# Patient Record
Sex: FEMALE | Race: WHITE | Employment: UNEMPLOYED | RURAL
[De-identification: names, ages, dates, MRNs, and addresses within clinical notes are randomized per-mention and may not be internally consistent; named-entity substitution may affect disease eponyms.]

---

## 2017-05-10 ENCOUNTER — HOSPITAL ENCOUNTER (OUTPATIENT)
Dept: GENERAL RADIOLOGY | Age: 62
Discharge: HOME | End: 2017-05-10
Attending: NURSE PRACTITIONER | Admitting: NURSE PRACTITIONER

## 2017-05-10 DIAGNOSIS — M79.662 BILATERAL CALF PAIN: ICD-10-CM

## 2017-05-10 DIAGNOSIS — M79.661 BILATERAL CALF PAIN: ICD-10-CM

## 2017-06-30 ENCOUNTER — OUTSIDE FACILITY SERVICE (OUTPATIENT)
Dept: CARDIOLOGY | Facility: CLINIC | Age: 62
End: 2017-06-30

## 2017-06-30 PROBLEM — E03.9 HYPOTHYROID: Status: ACTIVE | Noted: 2017-06-30

## 2017-06-30 PROBLEM — R20.0 NUMBNESS OF UPPER EXTREMITY: Status: ACTIVE | Noted: 2017-06-30

## 2017-06-30 PROCEDURE — 93306 TTE W/DOPPLER COMPLETE: CPT | Performed by: INTERNAL MEDICINE

## 2017-07-24 ENCOUNTER — OFFICE VISIT (OUTPATIENT)
Dept: NEUROLOGY | Facility: CLINIC | Age: 62
End: 2017-07-24

## 2017-07-24 VITALS
DIASTOLIC BLOOD PRESSURE: 81 MMHG | SYSTOLIC BLOOD PRESSURE: 140 MMHG | WEIGHT: 128 LBS | HEIGHT: 58 IN | HEART RATE: 96 BPM | BODY MASS INDEX: 26.87 KG/M2

## 2017-07-24 DIAGNOSIS — R41.840 CONCENTRATION DEFICIT: Primary | ICD-10-CM

## 2017-07-24 DIAGNOSIS — I73.9 MICROANGIOPATHY (HCC): ICD-10-CM

## 2017-07-24 DIAGNOSIS — E53.8 B12 DEFICIENCY: ICD-10-CM

## 2017-07-24 DIAGNOSIS — G43.009 MIGRAINE WITHOUT AURA AND WITHOUT STATUS MIGRAINOSUS, NOT INTRACTABLE: ICD-10-CM

## 2017-07-24 PROCEDURE — 99243 OFF/OP CNSLTJ NEW/EST LOW 30: CPT | Performed by: PSYCHIATRY & NEUROLOGY

## 2017-07-24 RX ORDER — CETIRIZINE HYDROCHLORIDE 10 MG/1
10 TABLET ORAL DAILY
COMMUNITY

## 2017-07-24 NOTE — PROGRESS NOTES
Subjective:     Patient ID: Nicole Mack is a 61 y.o. female.    History of Present Illness  The following portions of the patient's history were reviewed and updated as appropriate: allergies, current medications, past family history, past medical history, past social history, past surgical history and problem list.  60 y/o WF has been intermittent memory/concentration problems. Denies any problems functioning. She does not drive, does shop and cooks, handles finances. Resting well, denies syncope or mental lapses. She was admitted for headaches and numbness at [facility] last month, MRI of brain showed mild small vessel disease, carotid duplex bilateral ICA plaque, stenosis less than 50%. MRA negative. Symptoms resolved. Denies stroke symptoms or significant headaches since discharge. Found to have b12 deficiency in April, started on b12 shots.  Review of Systems   Constitutional: Negative for chills, fatigue, fever and unexpected weight change.   HENT: Negative for ear pain, hearing loss, nosebleeds, rhinorrhea and sore throat.    Eyes: Positive for visual disturbance. Negative for photophobia, pain, discharge and itching.   Respiratory: Positive for wheezing. Negative for cough, chest tightness and shortness of breath.    Cardiovascular: Negative for chest pain, palpitations and leg swelling.   Gastrointestinal: Negative for abdominal pain, blood in stool, constipation, diarrhea, nausea and vomiting.   Genitourinary: Negative for dysuria, frequency, hematuria and urgency.   Musculoskeletal: Positive for joint swelling. Negative for arthralgias, back pain, gait problem, myalgias, neck pain and neck stiffness.   Skin: Negative for rash and wound.   Allergic/Immunologic: Negative for environmental allergies and food allergies.   Neurological: Positive for headaches. Negative for dizziness, tremors, seizures, syncope, speech difficulty, weakness, light-headedness and numbness.   Hematological: Negative for adenopathy.  Does not bruise/bleed easily.   Psychiatric/Behavioral: Negative for agitation, confusion, decreased concentration, hallucinations, sleep disturbance and suicidal ideas. The patient is not nervous/anxious.         Objective:    Neurologic Exam     Mental Status   Oriented to person, place, and time.     Cranial Nerves     CN III, IV, VI   Pupils are equal, round, and reactive to light.  Extraocular motions are normal.     Motor Exam     Strength   Strength 5/5 throughout.       Physical Exam   Constitutional: She is oriented to person, place, and time. She appears well-developed and well-nourished.   HENT:   Head: Normocephalic and atraumatic.   Eyes: EOM are normal. Pupils are equal, round, and reactive to light.   Neck: Neck supple. Carotid bruit is not present.   Cardiovascular: Normal rate, regular rhythm, normal heart sounds and intact distal pulses.    Pulmonary/Chest: Effort normal and breath sounds normal.   Abdominal: Soft. Bowel sounds are normal.   Neurological: She is alert and oriented to person, place, and time. She has normal strength and normal reflexes. No cranial nerve deficit or sensory deficit. She displays a negative Romberg sign. Coordination and gait normal. She displays no Babinski's sign on the right side. She displays no Babinski's sign on the left side.   MMSE 27/30, STM 3/3;   Skin: Skin is warm.   Psychiatric: She has a normal mood and affect. Her behavior is normal. Thought content normal. Cognition and memory are normal.       Assessment/Plan:     Nicole was seen today for headache.    Diagnoses and all orders for this visit:    Concentration deficit    Microangiopathy    Migraine without aura and without status migrainosus, not intractable    B12 deficiency     We discussed that memory issues which are mild maybe related to b12 deficiency which is being corrected. She is to report new symptoms immediately, follow up in one year for a memory check.

## 2017-07-25 ENCOUNTER — PREP FOR SURGERY (OUTPATIENT)
Dept: OTHER | Facility: HOSPITAL | Age: 62
End: 2017-07-25

## 2017-07-25 DIAGNOSIS — H26.9 CATARACT, LEFT EYE: Primary | ICD-10-CM

## 2017-07-25 RX ORDER — CYCLOPENTOLATE HYDROCHLORIDE 20 MG/ML
1 SOLUTION/ DROPS OPHTHALMIC
Status: CANCELLED | OUTPATIENT
Start: 2017-07-25 | End: 2017-07-25

## 2017-07-25 RX ORDER — PHENYLEPHRINE HYDROCHLORIDE 100 MG/ML
1 SOLUTION/ DROPS OPHTHALMIC
Status: CANCELLED | OUTPATIENT
Start: 2017-07-25 | End: 2017-07-25

## 2017-07-25 RX ORDER — PREDNISOLONE ACETATE 10 MG/ML
1 SUSPENSION/ DROPS OPHTHALMIC 4 TIMES DAILY
Status: CANCELLED | OUTPATIENT
Start: 2017-07-25

## 2017-07-25 RX ORDER — TETRACAINE HYDROCHLORIDE 5 MG/ML
2 SOLUTION OPHTHALMIC AS NEEDED
Status: CANCELLED | OUTPATIENT
Start: 2017-07-25

## 2017-07-25 RX ORDER — POLYMYXIN B SULFATE AND TRIMETHOPRIM 1; 10000 MG/ML; [USP'U]/ML
1 SOLUTION OPHTHALMIC ONCE
Status: CANCELLED | OUTPATIENT
Start: 2017-07-25 | End: 2017-07-25

## 2017-07-25 RX ORDER — CYANOCOBALAMIN 1000 UG/ML
1000 INJECTION, SOLUTION INTRAMUSCULAR; SUBCUTANEOUS
COMMUNITY

## 2017-07-27 ENCOUNTER — ANESTHESIA EVENT (OUTPATIENT)
Dept: PERIOP | Facility: HOSPITAL | Age: 62
End: 2017-07-27

## 2017-07-27 ENCOUNTER — ANESTHESIA (OUTPATIENT)
Dept: PERIOP | Facility: HOSPITAL | Age: 62
End: 2017-07-27

## 2017-07-27 ENCOUNTER — HOSPITAL ENCOUNTER (OUTPATIENT)
Facility: HOSPITAL | Age: 62
Setting detail: HOSPITAL OUTPATIENT SURGERY
Discharge: HOME | End: 2017-07-27
Attending: OPHTHALMOLOGY | Admitting: OPHTHALMOLOGY

## 2017-07-27 VITALS
TEMPERATURE: 97.3 F | WEIGHT: 128 LBS | SYSTOLIC BLOOD PRESSURE: 126 MMHG | RESPIRATION RATE: 18 BRPM | HEART RATE: 56 BPM | HEIGHT: 58 IN | OXYGEN SATURATION: 96 % | DIASTOLIC BLOOD PRESSURE: 66 MMHG | BODY MASS INDEX: 26.87 KG/M2

## 2017-07-27 DIAGNOSIS — H26.9 CATARACT, LEFT EYE: ICD-10-CM

## 2017-07-27 PROCEDURE — 25010000002 MIDAZOLAM PER 1 MG: Performed by: NURSE ANESTHETIST, CERTIFIED REGISTERED

## 2017-07-27 PROCEDURE — 25010000002 EPINEPHRINE 1 MG/ML SOLUTION: Performed by: OPHTHALMOLOGY

## 2017-07-27 PROCEDURE — 25010000002 FENTANYL CITRATE (PF) 100 MCG/2ML SOLUTION: Performed by: NURSE ANESTHETIST, CERTIFIED REGISTERED

## 2017-07-27 PROCEDURE — V2632 POST CHMBR INTRAOCULAR LENS: HCPCS | Performed by: OPHTHALMOLOGY

## 2017-07-27 PROCEDURE — 25010000002 EPINEPHRINE 1 MG/ML SOLUTION 1 ML VIAL: Performed by: OPHTHALMOLOGY

## 2017-07-27 DEVICE — IMPLANTABLE DEVICE: Type: IMPLANTABLE DEVICE | Status: FUNCTIONAL

## 2017-07-27 RX ORDER — MIDAZOLAM HYDROCHLORIDE 1 MG/ML
INJECTION INTRAMUSCULAR; INTRAVENOUS AS NEEDED
Status: DISCONTINUED | OUTPATIENT
Start: 2017-07-27 | End: 2017-07-27 | Stop reason: SURG

## 2017-07-27 RX ORDER — TETRACAINE HYDROCHLORIDE 5 MG/ML
2 SOLUTION OPHTHALMIC AS NEEDED
Status: DISCONTINUED | OUTPATIENT
Start: 2017-07-27 | End: 2017-07-27 | Stop reason: HOSPADM

## 2017-07-27 RX ORDER — PHENYLEPHRINE HYDROCHLORIDE 100 MG/ML
1 SOLUTION/ DROPS OPHTHALMIC
Status: COMPLETED | OUTPATIENT
Start: 2017-07-27 | End: 2017-07-27

## 2017-07-27 RX ORDER — CYCLOPENTOLATE HYDROCHLORIDE 20 MG/ML
1 SOLUTION/ DROPS OPHTHALMIC
Status: COMPLETED | OUTPATIENT
Start: 2017-07-27 | End: 2017-07-27

## 2017-07-27 RX ORDER — POLYMYXIN B SULFATE AND TRIMETHOPRIM 1; 10000 MG/ML; [USP'U]/ML
1 SOLUTION OPHTHALMIC ONCE
Status: COMPLETED | OUTPATIENT
Start: 2017-07-27 | End: 2017-07-27

## 2017-07-27 RX ORDER — SODIUM CHLORIDE 0.9 % (FLUSH) 0.9 %
3 SYRINGE (ML) INJECTION AS NEEDED
Status: DISCONTINUED | OUTPATIENT
Start: 2017-07-27 | End: 2017-07-27 | Stop reason: HOSPADM

## 2017-07-27 RX ORDER — PREDNISOLONE ACETATE 10 MG/ML
1 SUSPENSION/ DROPS OPHTHALMIC 4 TIMES DAILY
Status: DISCONTINUED | OUTPATIENT
Start: 2017-07-27 | End: 2017-07-27 | Stop reason: HOSPADM

## 2017-07-27 RX ORDER — EPINEPHRINE 1 MG/ML
INJECTION INTRAMUSCULAR; INTRAVENOUS; SUBCUTANEOUS AS NEEDED
Status: DISCONTINUED | OUTPATIENT
Start: 2017-07-27 | End: 2017-07-27 | Stop reason: HOSPADM

## 2017-07-27 RX ORDER — SODIUM CHLORIDE, SODIUM LACTATE, POTASSIUM CHLORIDE, CALCIUM CHLORIDE 600; 310; 30; 20 MG/100ML; MG/100ML; MG/100ML; MG/100ML
1000 INJECTION, SOLUTION INTRAVENOUS CONTINUOUS PRN
Status: DISCONTINUED | OUTPATIENT
Start: 2017-07-27 | End: 2017-07-27 | Stop reason: HOSPADM

## 2017-07-27 RX ORDER — FENTANYL CITRATE 50 UG/ML
INJECTION, SOLUTION INTRAMUSCULAR; INTRAVENOUS AS NEEDED
Status: DISCONTINUED | OUTPATIENT
Start: 2017-07-27 | End: 2017-07-27 | Stop reason: SURG

## 2017-07-27 RX ORDER — PREDNISOLONE ACETATE 10 MG/ML
SUSPENSION/ DROPS OPHTHALMIC
Status: DISCONTINUED
Start: 2017-07-27 | End: 2017-07-27 | Stop reason: HOSPADM

## 2017-07-27 RX ORDER — LIDOCAINE HYDROCHLORIDE 40 MG/ML
SOLUTION TOPICAL AS NEEDED
Status: DISCONTINUED | OUTPATIENT
Start: 2017-07-27 | End: 2017-07-27 | Stop reason: HOSPADM

## 2017-07-27 RX ORDER — BALANCED SALT SOLUTION 6.4; .75; .48; .3; 3.9; 1.7 MG/ML; MG/ML; MG/ML; MG/ML; MG/ML; MG/ML
SOLUTION OPHTHALMIC AS NEEDED
Status: DISCONTINUED | OUTPATIENT
Start: 2017-07-27 | End: 2017-07-27 | Stop reason: HOSPADM

## 2017-07-27 RX ORDER — CYCLOPENTOLATE HYDROCHLORIDE 20 MG/ML
SOLUTION/ DROPS OPHTHALMIC
Status: COMPLETED
Start: 2017-07-27 | End: 2017-07-27

## 2017-07-27 RX ORDER — PHENYLEPHRINE HYDROCHLORIDE 100 MG/ML
SOLUTION/ DROPS OPHTHALMIC
Status: COMPLETED
Start: 2017-07-27 | End: 2017-07-27

## 2017-07-27 RX ADMIN — SODIUM CHLORIDE, POTASSIUM CHLORIDE, SODIUM LACTATE AND CALCIUM CHLORIDE 1000 ML: 600; 310; 30; 20 INJECTION, SOLUTION INTRAVENOUS at 10:33

## 2017-07-27 RX ADMIN — PHENYLEPHRINE HYDROCHLORIDE 1 DROP: 100 SOLUTION/ DROPS OPHTHALMIC at 10:23

## 2017-07-27 RX ADMIN — PHENYLEPHRINE HYDROCHLORIDE 1 DROP: 100 SOLUTION/ DROPS OPHTHALMIC at 10:33

## 2017-07-27 RX ADMIN — PHENYLEPHRINE HYDROCHLORIDE 1 DROP: 100 SOLUTION/ DROPS OPHTHALMIC at 10:28

## 2017-07-27 RX ADMIN — FENTANYL CITRATE 50 MCG: 50 INJECTION, SOLUTION INTRAMUSCULAR; INTRAVENOUS at 11:28

## 2017-07-27 RX ADMIN — CYCLOPENTOLATE HYDROCHLORIDE 1 DROP: 20 SOLUTION/ DROPS OPHTHALMIC at 10:23

## 2017-07-27 RX ADMIN — CYCLOPENTOLATE HYDROCHLORIDE 1 DROP: 20 SOLUTION/ DROPS OPHTHALMIC at 10:28

## 2017-07-27 RX ADMIN — MIDAZOLAM HYDROCHLORIDE 0.5 MG: 1 INJECTION, SOLUTION INTRAMUSCULAR; INTRAVENOUS at 11:23

## 2017-07-27 RX ADMIN — CYCLOPENTOLATE HYDROCHLORIDE 1 DROP: 20 SOLUTION/ DROPS OPHTHALMIC at 10:33

## 2017-07-27 RX ADMIN — MIDAZOLAM HYDROCHLORIDE 0.5 MG: 1 INJECTION, SOLUTION INTRAMUSCULAR; INTRAVENOUS at 11:28

## 2017-07-27 NOTE — ANESTHESIA PREPROCEDURE EVALUATION
Anesthesia Evaluation     Patient summary reviewed and Nursing notes reviewed   no history of anesthetic complications:  NPO Solid Status: > 8 hours  NPO Liquid Status: > 8 hours     Airway   Mallampati: I  TM distance: >3 FB  Neck ROM: full  no difficulty expected  Dental    (+) upper dentures    Pulmonary - negative pulmonary ROS and normal exam   Cardiovascular - normal exam    (+) hyperlipidemia      Neuro/Psych  (+) CVA residual symptoms,      ROS Comment: lle weakness  GI/Hepatic/Renal/Endo - negative ROS     Musculoskeletal (-) negative ROS    Abdominal    Substance History - negative use     OB/GYN negative ob/gyn ROS         Other - negative ROS                                       Anesthesia Plan    ASA 3     MAC     intravenous induction   Anesthetic plan and risks discussed with patient.

## 2017-07-27 NOTE — ANESTHESIA POSTPROCEDURE EVALUATION
Patient: Nicole Mack    Procedure Summary     Date Anesthesia Start Anesthesia Stop Room / Location    07/27/17 1121 1141  [facility] /  [facility]       Procedure Diagnosis Surgeon Provider    CATARACT PHACO EXTRACTION WITH INTRAOCULAR LENS IMPLANT LEFT (Left Eye) No diagnosis on file. MD Katrin Callahan CRNA          Anesthesia Type: MAC  Last vitals  BP   126/66 (07/27/17 1217)    Temp   97.3 °F (36.3 °C) (07/27/17 1147)    Pulse   56 (07/27/17 1217)   Resp   18 (07/27/17 1217)    SpO2   96 % (07/27/17 1217)      Post Anesthesia Care and Evaluation    Patient location during evaluation: PHASE II  Patient participation: complete - patient participated  Level of consciousness: awake and alert  Pain score: 0  Pain management: satisfactory to patient  Airway patency: patent  Anesthetic complications: No anesthetic complications  PONV Status: none  Cardiovascular status: acceptable and stable  Respiratory status: acceptable  Hydration status: acceptable

## 2017-07-27 NOTE — H&P
[id number]  Nicole Mack  female  1955  Jw Mansfield MD  7/27/2017  PATIENT NAME: Nicole Mack    [facility]  PREOPERATIVE PHYSICAL EXAMINATION    Temp:  [97.3 °F (36.3 °C)] 97.3 °F (36.3 °C)  Heart Rate:  [66] 66  Resp:  [18] 18  BP: (157)/(84) 157/84    Past Medical History:   Diagnosis Date   • Arthritis of back    • Disease of thyroid gland    • Fracture     LEFT PINKY TOE AT PRESENT TIME, RIGHT ANKLE WHEN A TEENAGER   • GERD (gastroesophageal reflux disease)    • High cholesterol    • [place] (hard of hearing)     REPORTS NO HEARING AIDS   • Memory loss     REPORTS WAS RECENTLY DIAGNOSED BY PCP WITH HAVE MILD MEMORY LOSS, EARLY ONSET OF DEMENTIA   • Osteoarthritis    • Stroke syndrome     REPORTS CVA IN 2002. REPORTS WEAKNESS IN EXTREMITIES FROM THIS.    • Valgus deformity, not elsewhere classified, right knee    • Wears glasses    • Wears partial dentures     UPPER PLATE       Past Surgical History:   Procedure Laterality Date   • BREAST CYST EXCISION Left     REPORTS EARLINE, REPORTS NO RESTRICTIONS ON LUE   • MOUTH SURGERY      ORAL EXTRACTIONS   • OOPHORECTOMY Left    • TOTAL KNEE ARTHROPLASTY Right 02/09/2016   • TUBAL ABDOMINAL LIGATION         Social History     Social History   • Marital status:      Spouse name: N/A   • Number of children: N/A   • Years of education: N/A     Occupational History   • Not on file.     Social History Main Topics   • Smoking status: Current Every Day Smoker     Packs/day: 1.00     Years: 42.00     Types: Cigarettes   • Smokeless tobacco: Never Used   • Alcohol use No   • Drug use: No   • Sexual activity: Defer     Other Topics Concern   • Not on file     Social History Narrative       Family History   Problem Relation Age of Onset   • Diabetes Father        Prescriptions Prior to Admission   Medication Sig Dispense Refill Last Dose   • alendronate (FOSAMAX) 70 MG tablet Take 70 mg by mouth Every 7 (Seven) Days.   7/27/2017 at 0630   • aspirin 325 MG  tablet Take 325 mg by mouth daily   7/26/2017 at 1000   • atorvastatin (LIPITOR) 10 MG tablet Take 10 mg by mouth Daily.   7/25/2017   • Calcium Carb-Cholecalciferol (CALCIUM 600-D PO) Take 600 mg by mouth Daily.   7/26/2017 at 1000   • cetirizine (zyrTEC) 10 MG tablet Take 10 mg by mouth Daily.   7/26/2017 at 1000   • cyanocobalamin 1000 MCG/ML injection Inject 1,000 mcg into the shoulder, thigh, or buttocks Every 28 (Twenty-Eight) Days.   7/26/2017 at 1000   • levothyroxine (SYNTHROID, LEVOTHROID) 25 MCG tablet Take 50 mcg by mouth Daily.   7/26/2017 at 1000   • omeprazole (priLOSEC) 20 MG capsule Take 20 mg by mouth Daily.  2 7/26/2017 at 0730        Within Normal Limits Abnormal   Mental Status [x]    []     Head, Neck, and Throat [x]   []     Chest/Lungs [x]   []     Cardiovascular [x]   []     Abdomen [x]   []     Extremities [x]   []     Neurologic [x]   []     Other       Diagnosis: Cataract      STATEMENT AS TO REASON OF PLANNED OPERATION:  [x]   H&P revealed no contraindication to planned surgery. (Check if correct).    [x]   Labs (if ordered) have been reviewed and revealed no contraindications to planned surgery. (Check if correct).    [x]   Patient has been advised to see her local medical doctor/family doctor for follow-up regarding systemic care and/or abnormal labs.  (Check if correct).    Jw Mansfield MD  7/27/2017

## 2017-07-27 NOTE — PLAN OF CARE
Problem: Cataract (Adult)  Goal: Signs and Symptoms of Listed Potential Problems Will be Absent or Manageable (Cataract)  Outcome: Ongoing (interventions implemented as appropriate)    07/27/17 1014   Cataract   Problems Assessed (Cataract) all   Problems Present (Cataract) none

## 2017-07-27 NOTE — OP NOTE
"Patient Name: Nicole Mack  Patient Number: [id number]    PRE-OPERATIVE DIAGNOSIS:  Cataract []  OD, [x]  OS  H25.1()    POST-OPERATIVE DIAGNOSIS:  Same    PROCEDURE:     CATARACT PHACO EXTRACTION WITH INTRAOCULAR LENS IMPLANT LEFT (Left)    Surgeon:      Jw Mansfield MD    Date of Operation:    7/27/2017    ANESTHESIA:   MAC  COMPLICATIONS:    None  SPECIMEN REMOVED:  Cataract  ESTIMATED BLOOD LOSS:  None    After identifying the patient and obtained fully-informed consent, preoperative drops placed as ordered.  Pre-operative \"time out\" performed.  Patient brought into the operating room and positioned.  Cardiac monitoring was instituted.  Anesthetic gtt to operative eye.      After a surgical scrub, the patient was prepped and draped in the standard ophthalmic fashion.  Lid speculum. Paracentesis. Viscoelastic. Keratome tunneled into anterior chamber.  Capsulorrhexis. Hydrodissection.    Phaco machine tested and found to be in good working order.  Two-handed phacoemulsification performed.     I/A to remove residual cortex.  Viscoelastic in capsular bag.  Intraocular lens placed in standard fashion and centered.  I/A to remove viscoelastic.  Wound hydrated, tested, and found to be watertight.      Lid speculum and drapes removed.  Antibiotic gtt. Eye shield.  Patient to recovery area.  Verbal and written discharge instructions given.  Follow-up appointment given.    Jw Mansfield MD      Immediate Post-Op Note  Date: 7/27/2017 11:42 AM      "

## 2017-08-16 ENCOUNTER — PREP FOR SURGERY (OUTPATIENT)
Dept: OTHER | Facility: HOSPITAL | Age: 62
End: 2017-08-16

## 2017-08-16 DIAGNOSIS — H26.9 CATARACT, RIGHT: Primary | ICD-10-CM

## 2017-08-16 RX ORDER — POLYMYXIN B SULFATE AND TRIMETHOPRIM 1; 10000 MG/ML; [USP'U]/ML
1 SOLUTION OPHTHALMIC ONCE
Status: CANCELLED | OUTPATIENT
Start: 2017-08-16 | End: 2017-08-16

## 2017-08-16 RX ORDER — PHENYLEPHRINE HYDROCHLORIDE 100 MG/ML
1 SOLUTION/ DROPS OPHTHALMIC
Status: CANCELLED | OUTPATIENT
Start: 2017-08-16 | End: 2017-08-16

## 2017-08-16 RX ORDER — CYCLOPENTOLATE HYDROCHLORIDE 20 MG/ML
1 SOLUTION/ DROPS OPHTHALMIC
Status: CANCELLED | OUTPATIENT
Start: 2017-08-16 | End: 2017-08-16

## 2017-08-16 RX ORDER — PREDNISOLONE ACETATE 10 MG/ML
1 SUSPENSION/ DROPS OPHTHALMIC 4 TIMES DAILY
Status: CANCELLED | OUTPATIENT
Start: 2017-08-16

## 2017-08-17 ENCOUNTER — ANESTHESIA (OUTPATIENT)
Dept: PERIOP | Facility: HOSPITAL | Age: 62
End: 2017-08-17

## 2017-08-17 ENCOUNTER — HOSPITAL ENCOUNTER (OUTPATIENT)
Facility: HOSPITAL | Age: 62
Setting detail: HOSPITAL OUTPATIENT SURGERY
Discharge: HOME | End: 2017-08-17
Attending: OPHTHALMOLOGY | Admitting: OPHTHALMOLOGY

## 2017-08-17 ENCOUNTER — ANESTHESIA EVENT (OUTPATIENT)
Dept: PERIOP | Facility: HOSPITAL | Age: 62
End: 2017-08-17

## 2017-08-17 VITALS
DIASTOLIC BLOOD PRESSURE: 74 MMHG | SYSTOLIC BLOOD PRESSURE: 131 MMHG | BODY MASS INDEX: 26.87 KG/M2 | TEMPERATURE: 97.3 F | HEART RATE: 70 BPM | WEIGHT: 128 LBS | RESPIRATION RATE: 18 BRPM | HEIGHT: 58 IN | OXYGEN SATURATION: 91 %

## 2017-08-17 DIAGNOSIS — H26.9 CATARACT, RIGHT: ICD-10-CM

## 2017-08-17 PROCEDURE — 25010000002 EPINEPHRINE 1 MG/ML SOLUTION: Performed by: OPHTHALMOLOGY

## 2017-08-17 PROCEDURE — 25010000002 EPINEPHRINE PER 0.1 MG: Performed by: OPHTHALMOLOGY

## 2017-08-17 PROCEDURE — V2632 POST CHMBR INTRAOCULAR LENS: HCPCS | Performed by: OPHTHALMOLOGY

## 2017-08-17 PROCEDURE — 25010000002 MIDAZOLAM PER 1 MG: Performed by: NURSE ANESTHETIST, CERTIFIED REGISTERED

## 2017-08-17 PROCEDURE — 25010000002 FENTANYL CITRATE (PF) 100 MCG/2ML SOLUTION: Performed by: NURSE ANESTHETIST, CERTIFIED REGISTERED

## 2017-08-17 DEVICE — IMPLANTABLE DEVICE: Type: IMPLANTABLE DEVICE | Site: POSTERIOR CHAMBER | Status: FUNCTIONAL

## 2017-08-17 RX ORDER — CYCLOPENTOLATE HYDROCHLORIDE 20 MG/ML
SOLUTION/ DROPS OPHTHALMIC
Status: COMPLETED
Start: 2017-08-17 | End: 2017-08-17

## 2017-08-17 RX ORDER — FENTANYL CITRATE 50 UG/ML
INJECTION, SOLUTION INTRAMUSCULAR; INTRAVENOUS AS NEEDED
Status: DISCONTINUED | OUTPATIENT
Start: 2017-08-17 | End: 2017-08-17 | Stop reason: SURG

## 2017-08-17 RX ORDER — SODIUM CHLORIDE 0.9 % (FLUSH) 0.9 %
3 SYRINGE (ML) INJECTION AS NEEDED
Status: DISCONTINUED | OUTPATIENT
Start: 2017-08-17 | End: 2017-08-17 | Stop reason: HOSPADM

## 2017-08-17 RX ORDER — HYDROCODONE BITARTRATE AND ACETAMINOPHEN 5; 325 MG/1; MG/1
5 TABLET ORAL CONTINUOUS PRN
COMMUNITY
Start: 2017-08-05 | End: 2017-09-25

## 2017-08-17 RX ORDER — CYCLOPENTOLATE HYDROCHLORIDE 20 MG/ML
1 SOLUTION/ DROPS OPHTHALMIC
Status: COMPLETED | OUTPATIENT
Start: 2017-08-17 | End: 2017-08-17

## 2017-08-17 RX ORDER — LIDOCAINE HYDROCHLORIDE 40 MG/ML
SOLUTION TOPICAL AS NEEDED
Status: DISCONTINUED | OUTPATIENT
Start: 2017-08-17 | End: 2017-08-17 | Stop reason: HOSPADM

## 2017-08-17 RX ORDER — PHENYLEPHRINE HYDROCHLORIDE 100 MG/ML
SOLUTION/ DROPS OPHTHALMIC
Status: COMPLETED
Start: 2017-08-17 | End: 2017-08-17

## 2017-08-17 RX ORDER — PREDNISOLONE ACETATE 10 MG/ML
1 SUSPENSION/ DROPS OPHTHALMIC 4 TIMES DAILY
Status: DISCONTINUED | OUTPATIENT
Start: 2017-08-17 | End: 2017-08-17 | Stop reason: HOSPADM

## 2017-08-17 RX ORDER — POLYMYXIN B SULFATE AND TRIMETHOPRIM 1; 10000 MG/ML; [USP'U]/ML
SOLUTION OPHTHALMIC AS NEEDED
Status: DISCONTINUED | OUTPATIENT
Start: 2017-08-17 | End: 2017-08-17 | Stop reason: HOSPADM

## 2017-08-17 RX ORDER — BALANCED SALT SOLUTION 6.4; .75; .48; .3; 3.9; 1.7 MG/ML; MG/ML; MG/ML; MG/ML; MG/ML; MG/ML
SOLUTION OPHTHALMIC AS NEEDED
Status: DISCONTINUED | OUTPATIENT
Start: 2017-08-17 | End: 2017-08-17 | Stop reason: HOSPADM

## 2017-08-17 RX ORDER — PREDNISOLONE ACETATE 10 MG/ML
SUSPENSION/ DROPS OPHTHALMIC
Status: DISCONTINUED
Start: 2017-08-17 | End: 2017-08-17 | Stop reason: HOSPADM

## 2017-08-17 RX ORDER — PHENYLEPHRINE HYDROCHLORIDE 100 MG/ML
1 SOLUTION/ DROPS OPHTHALMIC
Status: COMPLETED | OUTPATIENT
Start: 2017-08-17 | End: 2017-08-17

## 2017-08-17 RX ORDER — SODIUM CHLORIDE, SODIUM LACTATE, POTASSIUM CHLORIDE, CALCIUM CHLORIDE 600; 310; 30; 20 MG/100ML; MG/100ML; MG/100ML; MG/100ML
1000 INJECTION, SOLUTION INTRAVENOUS CONTINUOUS PRN
Status: DISCONTINUED | OUTPATIENT
Start: 2017-08-17 | End: 2017-08-17 | Stop reason: HOSPADM

## 2017-08-17 RX ORDER — TETRACAINE HYDROCHLORIDE 5 MG/ML
SOLUTION OPHTHALMIC AS NEEDED
Status: DISCONTINUED | OUTPATIENT
Start: 2017-08-17 | End: 2017-08-17 | Stop reason: HOSPADM

## 2017-08-17 RX ORDER — EPINEPHRINE 1 MG/ML
INJECTION INTRAMUSCULAR; INTRAVENOUS; SUBCUTANEOUS AS NEEDED
Status: DISCONTINUED | OUTPATIENT
Start: 2017-08-17 | End: 2017-08-17 | Stop reason: HOSPADM

## 2017-08-17 RX ORDER — MIDAZOLAM HYDROCHLORIDE 1 MG/ML
INJECTION INTRAMUSCULAR; INTRAVENOUS AS NEEDED
Status: DISCONTINUED | OUTPATIENT
Start: 2017-08-17 | End: 2017-08-17 | Stop reason: SURG

## 2017-08-17 RX ADMIN — MIDAZOLAM HYDROCHLORIDE 0.5 MG: 1 INJECTION, SOLUTION INTRAMUSCULAR; INTRAVENOUS at 11:25

## 2017-08-17 RX ADMIN — PHENYLEPHRINE HYDROCHLORIDE 1 DROP: 100 SOLUTION/ DROPS OPHTHALMIC at 09:54

## 2017-08-17 RX ADMIN — SODIUM CHLORIDE, SODIUM LACTATE, POTASSIUM CHLORIDE, AND CALCIUM CHLORIDE 1000 ML: 600; 310; 30; 20 INJECTION, SOLUTION INTRAVENOUS at 09:53

## 2017-08-17 RX ADMIN — CYCLOPENTOLATE HYDROCHLORIDE 1 DROP: 20 SOLUTION/ DROPS OPHTHALMIC at 09:43

## 2017-08-17 RX ADMIN — PHENYLEPHRINE HYDROCHLORIDE 1 DROP: 100 SOLUTION/ DROPS OPHTHALMIC at 09:43

## 2017-08-17 RX ADMIN — PHENYLEPHRINE HYDROCHLORIDE 1 DROP: 100 SOLUTION/ DROPS OPHTHALMIC at 09:48

## 2017-08-17 RX ADMIN — CYCLOPENTOLATE HYDROCHLORIDE 1 DROP: 20 SOLUTION/ DROPS OPHTHALMIC at 09:48

## 2017-08-17 RX ADMIN — FENTANYL CITRATE 50 MCG: 50 INJECTION, SOLUTION INTRAMUSCULAR; INTRAVENOUS at 11:27

## 2017-08-17 RX ADMIN — CYCLOPENTOLATE HYDROCHLORIDE 1 DROP: 20 SOLUTION/ DROPS OPHTHALMIC at 09:54

## 2017-08-17 RX ADMIN — MIDAZOLAM HYDROCHLORIDE 0.5 MG: 1 INJECTION, SOLUTION INTRAMUSCULAR; INTRAVENOUS at 11:18

## 2017-08-17 NOTE — ANESTHESIA PREPROCEDURE EVALUATION
Anesthesia Evaluation     Patient summary reviewed and Nursing notes reviewed   no history of anesthetic complications:  NPO Solid Status: > 8 hours  NPO Liquid Status: > 8 hours     Airway   Mallampati: I  TM distance: >3 FB  Neck ROM: full  no difficulty expected  Dental    (+) upper dentures    Pulmonary - normal exam   (+) a smoker (abstained) Current,   Cardiovascular - normal exam    (+) hyperlipidemia      Neuro/Psych  (+) CVA residual symptoms, headaches, psychiatric history,      ROS Comment: lle weakness  GI/Hepatic/Renal/Endo - negative ROS     Musculoskeletal     Abdominal    Substance History - negative use     OB/GYN negative ob/gyn ROS         Other   (+) arthritis                                       Anesthesia Plan    ASA 3     MAC   (Risks and benefits discussed including risk of aspiration, recall and dental damage. All patient questions answered. Will continue with POC.)  intravenous induction   Anesthetic plan and risks discussed with patient.

## 2017-08-17 NOTE — ANESTHESIA POSTPROCEDURE EVALUATION
Patient: Nicole Mack    Procedure Summary     Date Anesthesia Start Anesthesia Stop Room / Location    08/17/17 1116 1140 [facility] /  [facility]       Procedure Diagnosis Surgeon Provider    CATARACT PHACO EXTRACTION WITH INTRAOCULAR LENS IMPLANT RIGHT (Right Eye) No diagnosis on file. MD Katrin Callahan CRNA          Anesthesia Type: MAC  Last vitals  BP   131/74 (08/17/17 1204)    Temp   97.3 °F (36.3 °C) (08/17/17 1153)    Pulse   70 (08/17/17 1204)   Resp   18 (08/17/17 1204)    SpO2   91 % (08/17/17 1204)      Post Anesthesia Care and Evaluation    Patient location during evaluation: PHASE II  Patient participation: complete - patient participated  Level of consciousness: awake and alert  Pain score: 0  Pain management: satisfactory to patient  Airway patency: patent  Anesthetic complications: No anesthetic complications  PONV Status: none  Cardiovascular status: acceptable and stable  Respiratory status: acceptable  Hydration status: acceptable

## 2017-08-17 NOTE — OP NOTE
"Patient Name: Nicole Mack  Patient Number: [id number]    PRE-OPERATIVE DIAGNOSIS:  Cataract [x]  OD, []  OS  H25.1()    POST-OPERATIVE DIAGNOSIS:  Same    PROCEDURE:     CATARACT PHACO EXTRACTION WITH INTRAOCULAR LENS IMPLANT RIGHT (Right)    Surgeon:      Jw Mansfield MD    Date of Operation:    8/17/2017    ANESTHESIA:   MAC  COMPLICATIONS:    None  SPECIMEN REMOVED:  Cataract  ESTIMATED BLOOD LOSS:  None    After identifying the patient and obtained fully-informed consent, preoperative drops placed as ordered.  Pre-operative \"time out\" performed.  Patient brought into the operating room and positioned.  Cardiac monitoring was instituted.  Anesthetic gtt to operative eye.      After a surgical scrub, the patient was prepped and draped in the standard ophthalmic fashion.  Lid speculum. Paracentesis. Viscoelastic. Keratome tunneled into anterior chamber.  Capsulorrhexis. Hydrodissection.    Phaco machine tested and found to be in good working order.  Two-handed phacoemulsification performed.     I/A to remove residual cortex.  Viscoelastic in capsular bag.  Intraocular lens placed in standard fashion and centered.  I/A to remove viscoelastic.  Wound hydrated, tested, and found to be watertight.      Lid speculum and drapes removed.  Antibiotic gtt. Eye shield.  Patient to recovery area.  Verbal and written discharge instructions given.  Follow-up appointment given.    Jw Mansfield MD      Immediate Post-Op Note  Date: 8/17/2017 11:40 AM      "

## 2017-08-17 NOTE — H&P
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 REPORTS WEAKNESS IN EXTREMITIES FROM THIS.    • Valgus deformity, not elsewhere classified, right knee    • Wears glasses    • Wears partial dentures     UPPER PLATE       Past Surgical History:   Procedure Laterality Date   • BREAST CYST EXCISION Left     REPORTS BENINGN, REPORTS NO RESTRICTIONS ON LUE   • CATARACT EXTRACTION W/ INTRAOCULAR LENS IMPLANT Left 7/27/2017    Procedure: CATARACT PHACO EXTRACTION WITH INTRAOCULAR LENS IMPLANT LEFT;  Surgeon: Jw Mansfield MD;  Location: [facility];  Service:    • MOUTH SURGERY      ORAL EXTRACTIONS   • OOPHORECTOMY Left    • TOTAL KNEE ARTHROPLASTY Right 02/09/2016   • TUBAL ABDOMINAL LIGATION         Social History     Social History   • Marital status:      Spouse name: N/A   • Number of children: N/A   • Years of education: N/A     Occupational History   • Not on file.     Social History Main Topics   • Smoking status: Current Every Day Smoker     Packs/day: 1.00     Years: 42.00     Types: Cigarettes   • Smokeless tobacco: Never Used   • Alcohol use No   • Drug use: No   • Sexual activity: Defer     Other Topics Concern   • Not on file     Social History Narrative       Family History   Problem Relation Age of Onset   • Diabetes Father         Prescriptions Prior to Admission   Medication Sig Dispense Refill Last Dose   • alendronate (FOSAMAX) 70 MG tablet Take 70 mg by mouth Every 7 (Seven) Days.   8/16/2017 at 1030   • aspirin 325 MG tablet Take 325 mg by mouth daily   8/16/2017 at 1030   • atorvastatin (LIPITOR) 10 MG tablet Take 10 mg by mouth Daily.   8/16/2017 at 2100   • Calcium Carb-Cholecalciferol (CALCIUM 600-D PO) Take 600 mg by mouth Daily.   8/13/2017 at Unknown time   • cetirizine (zyrTEC) 10 MG tablet Take 10 mg by mouth Daily.   8/16/2017 at 1030   • cyanocobalamin 1000 MCG/ML injection Inject 1,000 mcg into the shoulder, thigh, or buttocks Every 28 (Twenty-Eight) Days.   8/10/2017   • HYDROcodone-acetaminophen (NORCO) 5-325 MG per tablet Take 5 tablets by mouth Continuous As Needed for discomfort.   8/15/2017   • levothyroxine (SYNTHROID, LEVOTHROID) 25 MCG tablet Take 50 mcg by mouth Daily.   8/16/2017 at 1030   • omeprazole (priLOSEC) 20 MG capsule Take 20 mg by mouth Daily.  2 8/16/2017 at 1030        Within Normal Limits Abnormal   Mental Status [x]    []     Head, Neck, and Throat [x]   []     Chest/Lungs [x]   []     Cardiovascular [x]   []     Abdomen [x]   []     Extremities [x]   []     Neurologic [x]   []     Other       Diagnosis: Cataract      STATEMENT AS TO REASON OF PLANNED OPERATION:  [x]   H&P revealed no contraindication to planned surgery. (Check if correct).    [x]   Labs (if ordered) have been reviewed and revealed no contraindications to planned surgery. (Check if correct).    [x]   Patient has been advised to see her local medical doctor/family doctor for follow-up regarding systemic care and/or abnormal labs.  (Check if correct).    Jw Mansfield MD  8/17/2017

## 2017-08-22 DIAGNOSIS — M25.562 PAIN IN BOTH KNEES, UNSPECIFIED CHRONICITY: Primary | ICD-10-CM

## 2017-08-22 DIAGNOSIS — M25.561 PAIN IN BOTH KNEES, UNSPECIFIED CHRONICITY: Primary | ICD-10-CM

## 2017-08-23 ENCOUNTER — OFFICE VISIT (OUTPATIENT)
Dept: ORTHOPEDIC SURGERY | Facility: CLINIC | Age: 62
End: 2017-08-23

## 2017-08-23 VITALS — HEIGHT: 60 IN | BODY MASS INDEX: 25.13 KG/M2 | WEIGHT: 128 LBS

## 2017-08-23 DIAGNOSIS — M17.12 PRIMARY OSTEOARTHRITIS OF LEFT KNEE: Primary | ICD-10-CM

## 2017-08-23 PROCEDURE — 99213 OFFICE O/P EST LOW 20 MIN: CPT | Performed by: PHYSICIAN ASSISTANT

## 2017-08-23 RX ORDER — OFLOXACIN 3 MG/ML
SOLUTION/ DROPS OPHTHALMIC
Refills: 2 | COMMUNITY
Start: 2017-07-25 | End: 2017-09-25

## 2017-08-23 RX ORDER — LEVOTHYROXINE SODIUM 0.05 MG/1
TABLET ORAL
Refills: 0 | COMMUNITY
Start: 2017-07-22 | End: 2017-09-25 | Stop reason: SDUPTHER

## 2017-08-23 NOTE — PROGRESS NOTES
Subjective   Patient ID: Nicole Mack is a 61 y.o.  female is here today for a treatment planning discussion. Pain of the Left Knee (Referred by Suzie Guzman, xrays at [facility] 8/5/17)         History of Present Illness    Patient presents with chronic left knee pain.  She denies injury or trauma.  She states the pain has been constant and has worsened over the last several months.  She states it will swell after being on her feet for long periods of time.  She has began using a walker for assistance.  Patient denies numbness or tingling.  Denies redness or warmth.  Denies recent fever or illness.      Pain Score: 7  Pain Location: Knee  Pain Orientation: Left     Pain Descriptors: Aching, Other (Comment) (swelling, stiffness)  Pain Frequency: Several days a week  Pain Onset: Gradual     Clinical Progression: Gradually worsening  Aggravating Factors: Standing        Pain Intervention(s): Rest  Result of Injury: No  Work-Related Injury: No    Past Medical History:   Diagnosis Date   • Arthritis of back    • Disease of thyroid gland    • Fracture     LEFT PINKY TOE AT PRESENT TIME, RIGHT ANKLE WHEN A TEENAGER   • GERD (gastroesophageal reflux disease)    • High cholesterol    • [place] (hard of hearing)     REPORTS NO HEARING AIDS   • Memory loss     REPORTS WAS RECENTLY DIAGNOSED BY PCP WITH HAVE MILD MEMORY LOSS, EARLY ONSET OF DEMENTIA   • Osteoarthritis    • Stroke syndrome     REPORTS CVA IN 2002. REPORTS WEAKNESS IN EXTREMITIES FROM THIS.    • Valgus deformity, not elsewhere classified, right knee    • Wears glasses    • Wears partial dentures     UPPER PLATE        Past Surgical History:   Procedure Laterality Date   • BREAST CYST EXCISION Left     REPORTS EARLINE, REPORTS NO RESTRICTIONS ON LUE   • CATARACT EXTRACTION W/ INTRAOCULAR LENS IMPLANT Left 7/27/2017    Procedure: CATARACT PHACO EXTRACTION WITH INTRAOCULAR LENS IMPLANT LEFT;  Surgeon: Jw Mansfield MD;  Location: [facility];  Service:    •  "CATARACT EXTRACTION W/ INTRAOCULAR LENS IMPLANT Right 8/17/2017    Procedure: CATARACT PHACO EXTRACTION WITH INTRAOCULAR LENS IMPLANT RIGHT;  Surgeon: Jw Mansfield MD;  Location: [facility];  Service:    • MOUTH SURGERY      ORAL EXTRACTIONS   • OOPHORECTOMY Left    • TOTAL KNEE ARTHROPLASTY Right 02/09/2016   • TUBAL ABDOMINAL LIGATION         Family History   Problem Relation Age of Onset   • Diabetes Father        Social History     Social History   • Marital status:      Spouse name: N/A   • Number of children: N/A   • Years of education: N/A     Occupational History   • Not on file.     Social History Main Topics   • Smoking status: Current Every Day Smoker     Packs/day: 1.00     Years: 42.00     Types: Cigarettes   • Smokeless tobacco: Never Used   • Alcohol use No   • Drug use: No   • Sexual activity: Defer     Other Topics Concern   • Not on file     Social History Narrative       No Known Allergies    Review of Systems   Constitutional: Negative for diaphoresis, fever and unexpected weight change.   HENT: Negative for dental problem and sore throat.    Eyes: Negative for visual disturbance.   Respiratory: Negative for shortness of breath.    Cardiovascular: Negative for chest pain.   Gastrointestinal: Negative for abdominal pain, constipation, diarrhea, nausea and vomiting.   Genitourinary: Negative for difficulty urinating and frequency.   Musculoskeletal: Positive for arthralgias.   Neurological: Negative for headaches.   Hematological: Does not bruise/bleed easily.   All other systems reviewed and are negative.      Objective   Ht 60\" (152.4 cm)  Wt 128 lb (58.1 kg)  LMP  (LMP Unknown)  BMI 25 kg/m2   Physical Exam   Constitutional: She is oriented to person, place, and time. She appears well-nourished.   Neck: No tracheal deviation present.   Cardiovascular: Normal rate.    Pulmonary/Chest: Effort normal.   Musculoskeletal:        Left knee: She exhibits decreased range of motion and swelling. " She exhibits no effusion, no ecchymosis and no erythema. Tenderness found. Medial joint line and lateral joint line tenderness noted.        Left ankle: She exhibits no swelling. No tenderness. Achilles tendon exhibits no pain.   Neurological: She is alert and oriented to person, place, and time.   Psychiatric: She has a normal mood and affect. Her behavior is normal.   Vitals reviewed.    Left Knee Exam     Range of Motion   Left knee extension: 4.   Flexion: 100     Other   Erythema: absent  Scars: present (patient has a well healed scar to the anterior lower leg  approx. 6 inches long)  Sensation: normal  Pulse: present  Effusion: no effusion present           Extremity DVT signs are Negative on physical exam with negative Jsohua sign, with no calf pain, with no palpable cords, with no increased pain with passive stretch/extension and with no skin tone change   Neurologic Exam     Mental Status   Oriented to person, place, and time.      Left Knee Exam     Tenderness   The patient is experiencing tenderness in the medial joint line, lateral joint line.               Assessment/Plan   Independent Review of Radiographic Studies:    X-ray of the left knee  performed at [facility] was reviewed  No comparison views are available.  There is severe tricompartmental osteoarthritic changes.       Procedures  [x] No procedures were performed in office today.     Nicole was seen today for pain.    Diagnoses and all orders for this visit:    Primary osteoarthritis of left knee     Orthopedic activities reviewed and patient expressed appreciation  Discussion of orthopedic goals  Risk, benefits, and merits of treatment alternatives reviewed with the patient and questions answered  The nature of the proposed surgery reviewed with the patient including risks, benefits, rehabilitation, recovery timeframe, and outcome expectations  After care and dental prophylaxis for joint replacement prosthesis    Recommendations/Plan:  Exercise,  medications, injections, other patient advice, and return appointment as noted.  Surgery: Surgery proposed at this visit as noted., If symptoms and functional limitations persist with current treatment, patient to consider elective surgery. and For intractable painful limiting condition, patient to consider elective surgical options.  Patient is encouraged to call or return for any issues or concerns.    Plan for LEFT TKA  From the schedule of total knee surgeries it appears the first available will be sometime mid October 2017.  Patient will return to the office for her preop evaluation as well as weightbearing x-rays of her left knee.    Patient agreeable to call or return sooner for any concerns.

## 2017-09-22 DIAGNOSIS — Z09 FOLLOW-UP EXAM: Primary | ICD-10-CM

## 2017-09-25 ENCOUNTER — OFFICE VISIT (OUTPATIENT)
Dept: ORTHOPEDIC SURGERY | Facility: CLINIC | Age: 62
End: 2017-09-25

## 2017-09-25 ENCOUNTER — APPOINTMENT (OUTPATIENT)
Dept: PREADMISSION TESTING | Facility: HOSPITAL | Age: 62
End: 2017-09-25

## 2017-09-25 ENCOUNTER — HOSPITAL ENCOUNTER (OUTPATIENT)
Dept: GENERAL RADIOLOGY | Facility: HOSPITAL | Age: 62
Discharge: HOME | End: 2017-09-25
Admitting: PHYSICIAN ASSISTANT

## 2017-09-25 VITALS
HEIGHT: 60 IN | DIASTOLIC BLOOD PRESSURE: 68 MMHG | WEIGHT: 128 LBS | SYSTOLIC BLOOD PRESSURE: 153 MMHG | HEART RATE: 65 BPM | BODY MASS INDEX: 25.13 KG/M2

## 2017-09-25 VITALS — WEIGHT: 128 LBS | RESPIRATION RATE: 18 BRPM | HEIGHT: 60 IN | BODY MASS INDEX: 25.13 KG/M2

## 2017-09-25 DIAGNOSIS — Z01.818 PRE-OP TESTING: ICD-10-CM

## 2017-09-25 DIAGNOSIS — M17.12 PRIMARY OSTEOARTHRITIS OF LEFT KNEE: Primary | ICD-10-CM

## 2017-09-25 LAB
ALBUMIN SERPL-MCNC: 4.5 G/DL (ref 3.5–5)
ALBUMIN/GLOB SERPL: 1.4 G/DL (ref 1–2)
ALP SERPL-CCNC: 93 U/L (ref 38–126)
ALT SERPL W P-5'-P-CCNC: 42 U/L (ref 13–69)
ANION GAP SERPL CALCULATED.3IONS-SCNC: 17.2 MMOL/L
APTT PPP: 27.7 SECONDS (ref 25–36)
AST SERPL-CCNC: 30 U/L (ref 15–46)
BASOPHILS # BLD AUTO: 0.07 10*3/MM3 (ref 0–0.2)
BASOPHILS NFR BLD AUTO: 1.1 % (ref 0–2.5)
BILIRUB SERPL-MCNC: 0.3 MG/DL (ref 0.2–1.3)
BILIRUB UR QL STRIP: NEGATIVE
BUN BLD-MCNC: 11 MG/DL (ref 7–20)
BUN/CREAT SERPL: 15.7 (ref 7.1–23.5)
CALCIUM SPEC-SCNC: 9.2 MG/DL (ref 8.4–10.2)
CHLORIDE SERPL-SCNC: 107 MMOL/L (ref 98–107)
CLARITY UR: CLEAR
CO2 SERPL-SCNC: 26 MMOL/L (ref 26–30)
COLOR UR: YELLOW
CREAT BLD-MCNC: 0.7 MG/DL (ref 0.6–1.3)
DEPRECATED RDW RBC AUTO: 51.8 FL (ref 37–54)
EOSINOPHIL # BLD AUTO: 0.13 10*3/MM3 (ref 0–0.7)
EOSINOPHIL NFR BLD AUTO: 2 % (ref 0–7)
ERYTHROCYTE [DISTWIDTH] IN BLOOD BY AUTOMATED COUNT: 14.9 % (ref 11.5–14.5)
GFR SERPL CREATININE-BSD FRML MDRD: 85 ML/MIN/1.73
GLOBULIN UR ELPH-MCNC: 3.3 GM/DL
GLUCOSE BLD-MCNC: 78 MG/DL (ref 74–98)
GLUCOSE UR STRIP-MCNC: NEGATIVE MG/DL
HCT VFR BLD AUTO: 44.8 % (ref 37–47)
HGB BLD-MCNC: 14.1 G/DL (ref 12–16)
HGB UR QL STRIP.AUTO: NEGATIVE
IMM GRANULOCYTES # BLD: 0.04 10*3/MM3 (ref 0–0.06)
IMM GRANULOCYTES NFR BLD: 0.6 % (ref 0–0.6)
INR PPP: 1 (ref 0.9–1.1)
KETONES UR QL STRIP: NEGATIVE
LEUKOCYTE ESTERASE UR QL STRIP.AUTO: NEGATIVE
LYMPHOCYTES # BLD AUTO: 2.71 10*3/MM3 (ref 0.6–3.4)
LYMPHOCYTES NFR BLD AUTO: 42.1 % (ref 10–50)
MCH RBC QN AUTO: 29.5 PG (ref 27–31)
MCHC RBC AUTO-ENTMCNC: 31.5 G/DL (ref 30–37)
MCV RBC AUTO: 93.7 FL (ref 81–99)
MONOCYTES # BLD AUTO: 0.63 10*3/MM3 (ref 0–0.9)
MONOCYTES NFR BLD AUTO: 9.8 % (ref 0–12)
NEUTROPHILS # BLD AUTO: 2.86 10*3/MM3 (ref 2–6.9)
NEUTROPHILS NFR BLD AUTO: 44.4 % (ref 37–80)
NITRITE UR QL STRIP: NEGATIVE
NRBC BLD MANUAL-RTO: 0 /100 WBC (ref 0–0)
PH UR STRIP.AUTO: <=5 [PH] (ref 5–8)
PLATELET # BLD AUTO: 379 10*3/MM3 (ref 130–400)
PMV BLD AUTO: 10.2 FL (ref 6–12)
POTASSIUM BLD-SCNC: 4.2 MMOL/L (ref 3.5–5.1)
PROT SERPL-MCNC: 7.8 G/DL (ref 6.3–8.2)
PROT UR QL STRIP: NEGATIVE
PROTHROMBIN TIME: 10.9 SECONDS (ref 9.3–12.1)
RBC # BLD AUTO: 4.78 10*6/MM3 (ref 4.2–5.4)
SODIUM BLD-SCNC: 146 MMOL/L (ref 137–145)
SP GR UR STRIP: <=1.005 (ref 1–1.03)
UROBILINOGEN UR QL STRIP: NORMAL
WBC NRBC COR # BLD: 6.44 10*3/MM3 (ref 4.8–10.8)

## 2017-09-25 PROCEDURE — 87081 CULTURE SCREEN ONLY: CPT | Performed by: PHYSICIAN ASSISTANT

## 2017-09-25 PROCEDURE — 36415 COLL VENOUS BLD VENIPUNCTURE: CPT | Performed by: PHYSICIAN ASSISTANT

## 2017-09-25 PROCEDURE — 80053 COMPREHEN METABOLIC PANEL: CPT | Performed by: PHYSICIAN ASSISTANT

## 2017-09-25 PROCEDURE — 85730 THROMBOPLASTIN TIME PARTIAL: CPT | Performed by: PHYSICIAN ASSISTANT

## 2017-09-25 PROCEDURE — S0260 H&P FOR SURGERY: HCPCS | Performed by: PHYSICIAN ASSISTANT

## 2017-09-25 PROCEDURE — 85025 COMPLETE CBC W/AUTO DIFF WBC: CPT | Performed by: PHYSICIAN ASSISTANT

## 2017-09-25 PROCEDURE — 81003 URINALYSIS AUTO W/O SCOPE: CPT | Performed by: PHYSICIAN ASSISTANT

## 2017-09-25 PROCEDURE — 85610 PROTHROMBIN TIME: CPT | Performed by: PHYSICIAN ASSISTANT

## 2017-09-25 PROCEDURE — 71020 HC CHEST PA AND LATERAL: CPT

## 2017-09-25 RX ORDER — ATORVASTATIN CALCIUM 10 MG/1
10 TABLET, FILM COATED ORAL DAILY
COMMUNITY
Start: 2017-08-24

## 2017-09-25 RX ORDER — CYANOCOBALAMIN 1000 UG/ML
INJECTION, SOLUTION INTRAMUSCULAR; SUBCUTANEOUS
COMMUNITY
End: 2017-09-25 | Stop reason: SDUPTHER

## 2017-09-25 RX ORDER — OMEPRAZOLE 20 MG/1
CAPSULE, DELAYED RELEASE ORAL DAILY
COMMUNITY
Start: 2017-06-08 | End: 2017-09-25

## 2017-09-25 RX ORDER — FAMOTIDINE 20 MG
TABLET ORAL DAILY
COMMUNITY
End: 2017-09-25

## 2017-09-25 RX ORDER — AMOXICILLIN 500 MG/1
CAPSULE ORAL
COMMUNITY
Start: 2017-08-24 | End: 2017-09-25

## 2017-09-25 RX ORDER — MELOXICAM 15 MG/1
TABLET ORAL DAILY
COMMUNITY
Start: 2017-06-08 | End: 2017-09-25

## 2017-09-25 RX ORDER — AMOXICILLIN 500 MG/1
500 TABLET, FILM COATED ORAL 3 TIMES DAILY
COMMUNITY
Start: 2017-08-24 | End: 2017-09-25

## 2017-09-25 RX ORDER — LEVOTHYROXINE SODIUM 0.07 MG/1
75 TABLET ORAL DAILY
COMMUNITY
Start: 2017-09-11

## 2017-09-25 RX ORDER — MELOXICAM 15 MG/1
TABLET ORAL
COMMUNITY
Start: 2017-09-06 | End: 2017-09-25

## 2017-09-25 RX ORDER — ACETAMINOPHEN 500 MG
1000 TABLET ORAL EVERY 6 HOURS PRN
COMMUNITY
End: 2017-10-12 | Stop reason: HOSPADM

## 2017-09-25 RX ORDER — OFLOXACIN 3 MG/ML
SOLUTION/ DROPS OPHTHALMIC
COMMUNITY
End: 2017-09-25 | Stop reason: SDUPTHER

## 2017-09-25 RX ORDER — LEVOTHYROXINE SODIUM 0.05 MG/1
50 TABLET ORAL DAILY
COMMUNITY
Start: 2017-08-24 | End: 2017-09-25 | Stop reason: SDUPTHER

## 2017-09-25 ASSESSMENT — KOOS JR
KOOS JR SCORE: 13
KOOS JR SCORE: 54.84

## 2017-09-25 NOTE — PROGRESS NOTES
Nicole Mack is a 61 y.o. right hand dominant female   is here today for a discussion of treatment plans, surgery, and rehabilitation.  Follow-up, Pain, and Pre-op Exam of the Left Knee       History of Present Illness      This is a chronic condition.  Patient presents for her preop evaluation in regards to left total knee replacement.  Patient states she has been dealing with left knee osteoarthritis for numerous years.  She denies injury or trauma.  Denies numbness or tingling to the left lower extremity.  Patient states she cannot tolerate cortisone injections.  She states she had an injection in her right knee years ago and had a very bad painful reaction and does not want another injection.  She does not want to try Visco supplementation injections.  She states she had great success with her right total knee replacement and would like to proceed with her left total knee.  She has tried over-the-counter anti-inflammatories, knee bracing, warm heat and ice packs with no relief.  She states the knee pain interferes with her daily activity  PAST MEDICAL HISTORY:    Past Medical History:   Diagnosis Date   • Arthritis of back    • Disease of thyroid gland    • Fracture     LEFT PINKY TOE AT PRESENT TIME, RIGHT ANKLE WHEN A TEENAGER   • GERD (gastroesophageal reflux disease)    • High cholesterol    • [place] (hard of hearing)     REPORTS NO HEARING AIDS   • Memory loss     REPORTS WAS RECENTLY DIAGNOSED BY PCP WITH HAVE MILD MEMORY LOSS, EARLY ONSET OF DEMENTIA   • Osteoarthritis    • Stroke syndrome     REPORTS CVA IN 2002. REPORTS WEAKNESS IN EXTREMITIES FROM THIS.    • Valgus deformity, not elsewhere classified, right knee    • Wears glasses    • Wears partial dentures     UPPER PLATE         Current Outpatient Prescriptions:   •  atorvastatin (LIPITOR) 10 MG tablet, Take  by mouth Daily., Disp: , Rfl:   •  meloxicam (MOBIC) 15 MG tablet, Take  by mouth Daily., Disp: , Rfl:   •  omeprazole (PRILOSEC) 20 MG  capsule, Take  by mouth Daily., Disp: , Rfl:   •  acetaminophen (TYLENOL) 500 MG tablet, Take  by mouth., Disp: , Rfl:   •  alendronate (FOSAMAX) 70 MG tablet, Take 70 mg by mouth Every 7 (Seven) Days., Disp: , Rfl:   •  amoxicillin (AMOXIL) 500 MG capsule, , Disp: , Rfl:   •  amoxicillin (AMOXIL) 500 MG tablet, Take 500 mg by mouth 3 (Three) Times a Day., Disp: , Rfl:   •  aspirin 325 MG EC tablet, Take  by mouth Daily., Disp: , Rfl:   •  aspirin 325 MG tablet, Take 325 mg by mouth daily, Disp: , Rfl:   •  atorvastatin (LIPITOR) 10 MG tablet, Take 10 mg by mouth Daily., Disp: , Rfl:   •  Calcium Carb-Cholecalciferol (CALCIUM 600-D PO), Take 600 mg by mouth Daily., Disp: , Rfl:   •  cetirizine (zyrTEC) 10 MG tablet, Take 10 mg by mouth Daily., Disp: , Rfl:   •  cyanocobalamin 1000 MCG/ML injection, Inject 1,000 mcg into the shoulder, thigh, or buttocks Every 28 (Twenty-Eight) Days., Disp: , Rfl:   •  cyanocobalamin 1000 MCG/ML injection, Cyanocobalamin 1000 MCG/ML Injection Solution  1 injection Qmonthly (1000 MCG/ML) Active, Disp: , Rfl:   •  HYDROcodone-acetaminophen (NORCO) 5-325 MG per tablet, Take 5 tablets by mouth Continuous As Needed for discomfort., Disp: , Rfl:   •  levothyroxine (SYNTHROID) 50 MCG tablet, Take  by mouth Daily., Disp: , Rfl:   •  levothyroxine (SYNTHROID, LEVOTHROID) 25 MCG tablet, Take 50 mcg by mouth Daily., Disp: , Rfl:   •  levothyroxine (SYNTHROID, LEVOTHROID) 50 MCG tablet, , Disp: , Rfl: 0  •  levothyroxine (SYNTHROID, LEVOTHROID) 75 MCG tablet, , Disp: , Rfl:   •  meloxicam (MOBIC) 15 MG tablet, , Disp: , Rfl:   •  ofloxacin (OCUFLOX) 0.3 % ophthalmic solution, , Disp: , Rfl: 2  •  ofloxacin (OCUFLOX) 0.3 % ophthalmic solution, Apply  to eye., Disp: , Rfl:   •  omeprazole (priLOSEC) 20 MG capsule, Take 20 mg by mouth Daily., Disp: , Rfl: 2  •  Vitamin D, Cholecalciferol, 1000 units capsule, Take  by mouth Daily., Disp: , Rfl:     Past Surgical History:   Procedure Laterality Date  "  • BREAST CYST EXCISION Left     REPORTS BENINGN, REPORTS NO RESTRICTIONS ON LUE   • CATARACT EXTRACTION W/ INTRAOCULAR LENS IMPLANT Left 7/27/2017    Procedure: CATARACT PHACO EXTRACTION WITH INTRAOCULAR LENS IMPLANT LEFT;  Surgeon: Jw Mansfield MD;  Location: [facility] OR;  Service:    • CATARACT EXTRACTION W/ INTRAOCULAR LENS IMPLANT Right 8/17/2017    Procedure: CATARACT PHACO EXTRACTION WITH INTRAOCULAR LENS IMPLANT RIGHT;  Surgeon: Jw Mansfield MD;  Location: [facility] OR;  Service:    • MOUTH SURGERY      ORAL EXTRACTIONS   • OOPHORECTOMY Left    • TOTAL KNEE ARTHROPLASTY Right 02/09/2016   • TUBAL ABDOMINAL LIGATION         Family History   Problem Relation Age of Onset   • Diabetes Father        Social History     Social History   • Marital status:      Spouse name: N/A   • Number of children: N/A   • Years of education: N/A     Occupational History   • Not on file.     Social History Main Topics   • Smoking status: Current Every Day Smoker     Packs/day: 1.00     Years: 42.00     Types: Cigarettes   • Smokeless tobacco: Never Used   • Alcohol use No   • Drug use: No   • Sexual activity: Defer     Other Topics Concern   • Not on file     Social History Narrative        Allergies   Allergen Reactions   • Bee Venom        Review of Systems   Constitutional: Negative for fever.   HENT: Negative for voice change.    Eyes: Negative for visual disturbance.   Respiratory: Negative for shortness of breath.    Cardiovascular: Negative for chest pain.   Gastrointestinal: Negative for abdominal distention and abdominal pain.   Genitourinary: Negative for dysuria.   Musculoskeletal: Positive for arthralgias. Negative for gait problem and joint swelling.   Skin: Negative for rash.   Neurological: Negative for speech difficulty.   Hematological: Does not bruise/bleed easily.   Psychiatric/Behavioral: Negative for confusion.       PHYSICAL EXAMINATION:       Resp 18  Ht 60\" (152.4 cm)  Wt 128 lb (58.1 kg)  BMI 25 " kg/m2    GENERAL [x] Well developed  []Ill appearing [x] No acute distress    HEENT [x]No acute changes [x] Normocephalic, atraumatic    NECK [x]Supple  [] No midline tenderness    LUNGS [x]Clear bilaterally [x]No wheezes []Rhonchi [] Rales    HEART [x] Regular rate  [x] Regular rhythm [] Irregular    ABDOMEN [x] Soft [x] Not tender [x] Not distended [x] Normal sounds    VAS/EXT [x] Normal Pulses []Edema []Cyanosis             SKIN [x] Warm [x]Dry []Pink []Ecchymosis []Cool    NEURO [x] Sensation Intact [x] Motor Intact [x] Pulse intact  [] Dysesthesias:_________  []Weakness:__________             Physical Exam   Constitutional: She is oriented to person, place, and time. She appears well-nourished.   Eyes: Conjunctivae are normal.   Neck: No tracheal deviation present.   Pulmonary/Chest: Effort normal. No respiratory distress.   Abdominal: Soft. She exhibits no distension. There is no tenderness.   Musculoskeletal:        Left knee: She exhibits no effusion.   Neurological: She is alert and oriented to person, place, and time.   Skin: No rash noted.   Psychiatric: She has a normal mood and affect. Her behavior is normal.   Vitals reviewed.    Left Knee Exam     Tenderness   The patient is experiencing tenderness in the lateral joint line, medial joint line and medial retinaculum.    Range of Motion   Left knee extension: 5.   Flexion: 100     Tests   Patellar Apprehension: trace    Other   Erythema: absent  Sensation: normal  Pulse: present  Swelling: mild  Effusion: no effusion present          Extremity DVT signs are Negative on physical exam with negative Joshua sign, with no calf pain, with no palpable cords, with no increased pain with passive stretch/extension and with no skin tone change   Neurologic Exam     Mental Status   Oriented to person, place, and time.     Ortho Exam      DVT Screening: No Yes   Smoker [x] []   Personal/Family History of DVT or PE [x] []   Sedentary Lifestyle [x] []   Overweight [x]  []       Previous or Active DVT/PE: NO  Cardiac Stent within 1 year: NO  Embolic/Ischemic stroke within 1 year: NO  Creatinine less than 30ml/min: NO  Congenital Thrombophilia: NO  Hypersensitivity to TXA: NO  Color Blindness: NO  NOTE:  TXA  tranexemic acid summary: THIS PATIENT IS A CANDIDATE FOR IV TXA DURING SURGERY.    Independent Review of Radiographic Studies:    Indication to evaluate joint condition, and compared with prior images, shows evident chronic advanced osteoarthritis.  Laboratory and Other Studies:  No new results reviewed today.     Risks, benefits, and alternative treatments discussed with the patient: [x] Yes [] No     Risk benefits and merits of the proposed surgery were discussed and the patient's questions were answered risks discussed including and not limited to:  Anesthesia reactions  Blood loss and possible transfusion  Infection  Deep venous thrombosis and pulmonary embolus  Nerve, vascular or tendon injury  Fracture  Deformity  Stiffness  Weakness  Prosthesis and implant issues such as loosening, material wear or dislocation  Skin necrosis  Revision surgery or further treatment  Recurrence of problem and condition     Informed consent: [] Signed  [x] To be obtained at hospital  [] Silver Rivera was seen today for follow-up, pain and pre-op exam.    Diagnoses and all orders for this visit:    Primary osteoarthritis of left knee  -     Inpatient Admission; Standing  -     Case Request; Standing  -     Provide instructions to patient regarding NPO status  -     Obtain informed consent  -     Clorhexidine skin prep  -     CBC and Differential  -     Comprehensive metabolic panel  -     Protime-INR  -     APTT  -     MRSA screen  -     Urinalysis w/Culture if Indicated  -     ECG 12 Lead  -     XR chest 2 vw  -     Follow anesthesia standing orders.; Standing  -     Verify NPO Status; Standing  -     TOMMIE hose- To be placed on patient in pre-op; Standing  -     SCD (sequential compression  device)- to be placed on patient in Pre-op; Standing  -     POC Glucose Fingerstick; Standing  -     Notify physician (specify); Standing  -     ceFAZolin (ANCEF) 2 g in sodium chloride 0.9 % 100 mL IVPB; Infuse 2 g into a venous catheter On Call to the Operating Room.  -     Tranexamic Acid 581 mg in sodium chloride 0.9 % 250 mL; Infuse 581 mg into a venous catheter On Call to the Operating Room.  -     Tranexamic Acid 581 mg in sodium chloride 0.9 % 250 mL; Infuse 581 mg into a venous catheter On Call to the Operating Room.  -     famotidine (PEPCID) injection 20 mg; Infuse 2 mL into a venous catheter On Call to the Operating Room.  -     Case Request    Pre-op testing  -     Inpatient Admission; Standing  -     Case Request; Standing  -     Provide instructions to patient regarding NPO status  -     Obtain informed consent  -     Clorhexidine skin prep  -     CBC and Differential  -     Comprehensive metabolic panel  -     Protime-INR  -     APTT  -     MRSA screen  -     Urinalysis w/Culture if Indicated  -     ECG 12 Lead  -     XR chest 2 vw  -     Follow anesthesia standing orders.; Standing  -     Verify NPO Status; Standing  -     TOMMIE hose- To be placed on patient in pre-op; Standing  -     SCD (sequential compression device)- to be placed on patient in Pre-op; Standing  -     POC Glucose Fingerstick; Standing  -     Notify physician (specify); Standing  -     ceFAZolin (ANCEF) 2 g in sodium chloride 0.9 % 100 mL IVPB; Infuse 2 g into a venous catheter On Call to the Operating Room.  -     Tranexamic Acid 581 mg in sodium chloride 0.9 % 250 mL; Infuse 581 mg into a venous catheter On Call to the Operating Room.  -     Tranexamic Acid 581 mg in sodium chloride 0.9 % 250 mL; Infuse 581 mg into a venous catheter On Call to the Operating Room.  -     famotidine (PEPCID) injection 20 mg; Infuse 2 mL into a venous catheter On Call to the Operating Room.  -     Case  Request       Recommendations/Plan:    Orthopedic activities reviewed and patient expressed appreciation  Discussion of orthopedic goals  Risk, benefits, and merits of treatment alternatives reviewed with the patient and questions answered  The nature of the proposed surgery reviewed with the patient including risks, benefits, rehabilitation, recovery timeframe, and outcome expectations    Surgery: Surgery proposed at this visit as noted., If symptoms and functional limitations persist with current treatment, patient to consider elective surgery. and For intractable painful limiting condition, patient to consider elective surgical options.  Patient is encouraged to call or return for any issues or concerns.    PLANNED SURGICAL PROCEDURE: Elective Left total knee replacement

## 2017-09-27 LAB — MRSA SPEC QL CULT: NORMAL

## 2017-10-05 ENCOUNTER — HOSPITAL ENCOUNTER (OUTPATIENT)
Dept: GENERAL RADIOLOGY | Age: 62
Discharge: HOME | End: 2017-10-05

## 2017-10-05 DIAGNOSIS — Z12.31 ENCOUNTER FOR MAMMOGRAM TO ESTABLISH BASELINE MAMMOGRAM: ICD-10-CM

## 2017-10-10 ENCOUNTER — HOSPITAL ENCOUNTER (INPATIENT)
Facility: HOSPITAL | Age: 62
LOS: 2 days | Discharge: HOME | End: 2017-10-12
Attending: ORTHOPAEDIC SURGERY | Admitting: ORTHOPAEDIC SURGERY

## 2017-10-10 ENCOUNTER — ANESTHESIA EVENT (OUTPATIENT)
Dept: PERIOP | Facility: HOSPITAL | Age: 62
End: 2017-10-10

## 2017-10-10 ENCOUNTER — ANESTHESIA (OUTPATIENT)
Dept: PERIOP | Facility: HOSPITAL | Age: 62
End: 2017-10-10

## 2017-10-10 ENCOUNTER — APPOINTMENT (OUTPATIENT)
Dept: GENERAL RADIOLOGY | Facility: HOSPITAL | Age: 62
End: 2017-10-10

## 2017-10-10 DIAGNOSIS — Z01.818 PRE-OP TESTING: ICD-10-CM

## 2017-10-10 DIAGNOSIS — M17.12 PRIMARY OSTEOARTHRITIS OF LEFT KNEE: ICD-10-CM

## 2017-10-10 DIAGNOSIS — Z74.09 IMPAIRED MOBILITY AND ADLS: ICD-10-CM

## 2017-10-10 DIAGNOSIS — Z78.9 IMPAIRED MOBILITY AND ADLS: ICD-10-CM

## 2017-10-10 DIAGNOSIS — Z74.09 IMPAIRED FUNCTIONAL MOBILITY, BALANCE, GAIT, AND ENDURANCE: Primary | ICD-10-CM

## 2017-10-10 DIAGNOSIS — Z96.652 S/P TOTAL KNEE REPLACEMENT, LEFT: ICD-10-CM

## 2017-10-10 PROBLEM — M17.9 OA (OSTEOARTHRITIS) OF KNEE: Status: ACTIVE | Noted: 2017-10-10

## 2017-10-10 PROCEDURE — 25010000002 MORPHINE PER 10 MG: Performed by: ORTHOPAEDIC SURGERY

## 2017-10-10 PROCEDURE — 25010000003 CEFAZOLIN PER 500 MG: Performed by: ORTHOPAEDIC SURGERY

## 2017-10-10 PROCEDURE — 25010000002 PROPOFOL 200 MG/20ML EMULSION: Performed by: NURSE ANESTHETIST, CERTIFIED REGISTERED

## 2017-10-10 PROCEDURE — 94640 AIRWAY INHALATION TREATMENT: CPT

## 2017-10-10 PROCEDURE — 88300 SURGICAL PATH GROSS: CPT | Performed by: ORTHOPAEDIC SURGERY

## 2017-10-10 PROCEDURE — 0SRD0J9 REPLACEMENT OF LEFT KNEE JOINT WITH SYNTHETIC SUBSTITUTE, CEMENTED, OPEN APPROACH: ICD-10-PCS | Performed by: ORTHOPAEDIC SURGERY

## 2017-10-10 PROCEDURE — 25010000002 FENTANYL CITRATE (PF) 100 MCG/2ML SOLUTION: Performed by: NURSE ANESTHETIST, CERTIFIED REGISTERED

## 2017-10-10 PROCEDURE — C1776 JOINT DEVICE (IMPLANTABLE): HCPCS | Performed by: ORTHOPAEDIC SURGERY

## 2017-10-10 PROCEDURE — 94799 UNLISTED PULMONARY SVC/PX: CPT

## 2017-10-10 PROCEDURE — 87340 HEPATITIS B SURFACE AG IA: CPT | Performed by: ORTHOPAEDIC SURGERY

## 2017-10-10 PROCEDURE — 87521 HEPATITIS C PROBE&RVRS TRNSC: CPT | Performed by: ORTHOPAEDIC SURGERY

## 2017-10-10 PROCEDURE — 27447 TOTAL KNEE ARTHROPLASTY: CPT | Performed by: ORTHOPAEDIC SURGERY

## 2017-10-10 PROCEDURE — 73560 X-RAY EXAM OF KNEE 1 OR 2: CPT

## 2017-10-10 PROCEDURE — 97161 PT EVAL LOW COMPLEX 20 MIN: CPT

## 2017-10-10 PROCEDURE — 87389 HIV-1 AG W/HIV-1&-2 AB AG IA: CPT | Performed by: ORTHOPAEDIC SURGERY

## 2017-10-10 PROCEDURE — C1713 ANCHOR/SCREW BN/BN,TIS/BN: HCPCS | Performed by: ORTHOPAEDIC SURGERY

## 2017-10-10 DEVICE — IMPLANTABLE DEVICE: Type: IMPLANTABLE DEVICE | Site: KNEE | Status: FUNCTIONAL

## 2017-10-10 DEVICE — GEN II 7.5MM RESUR PAT 26MM
Type: IMPLANTABLE DEVICE | Site: KNEE | Status: FUNCTIONAL
Brand: GENESIS II

## 2017-10-10 DEVICE — LEGION HIGHLY CROSS LINKED                                    POLYETHYLENE DISHED INSERT SIZE 1-2 9MM
Type: IMPLANTABLE DEVICE | Site: KNEE | Status: FUNCTIONAL
Brand: LEGION

## 2017-10-10 DEVICE — CMT BONE SIMPLEX/P FULL DOSE 10/PK: Type: IMPLANTABLE DEVICE | Site: KNEE | Status: FUNCTIONAL

## 2017-10-10 DEVICE — LEGION NARROW CRUCIATE RETAINING                                    OXINIUM SIZE 3N LEFT
Type: IMPLANTABLE DEVICE | Site: KNEE | Status: FUNCTIONAL
Brand: LEGION

## 2017-10-10 DEVICE — GENESIS II NON-POROUS TIBIAL                                    BASEPLATE SIZE 2 LEFT
Type: IMPLANTABLE DEVICE | Site: KNEE | Status: FUNCTIONAL
Brand: GENESIS II

## 2017-10-10 RX ORDER — SODIUM CHLORIDE 0.9 % (FLUSH) 0.9 %
1-10 SYRINGE (ML) INJECTION AS NEEDED
Status: DISCONTINUED | OUTPATIENT
Start: 2017-10-10 | End: 2017-10-12 | Stop reason: HOSPADM

## 2017-10-10 RX ORDER — MEPERIDINE HYDROCHLORIDE 50 MG/ML
INJECTION INTRAMUSCULAR; INTRAVENOUS; SUBCUTANEOUS
Status: DISPENSED
Start: 2017-10-10 | End: 2017-10-11

## 2017-10-10 RX ORDER — ACETAMINOPHEN 325 MG/1
TABLET ORAL
Status: COMPLETED
Start: 2017-10-10 | End: 2017-10-10

## 2017-10-10 RX ORDER — ACETAMINOPHEN 325 MG/1
650 TABLET ORAL ONCE
Status: COMPLETED | OUTPATIENT
Start: 2017-10-10 | End: 2017-10-10

## 2017-10-10 RX ORDER — LEVOTHYROXINE SODIUM 0.07 MG/1
75 TABLET ORAL
Status: DISCONTINUED | OUTPATIENT
Start: 2017-10-11 | End: 2017-10-12 | Stop reason: HOSPADM

## 2017-10-10 RX ORDER — MEPERIDINE HYDROCHLORIDE 25 MG/ML
12.5 INJECTION INTRAMUSCULAR; INTRAVENOUS; SUBCUTANEOUS
Status: DISCONTINUED | OUTPATIENT
Start: 2017-10-10 | End: 2017-10-10 | Stop reason: HOSPADM

## 2017-10-10 RX ORDER — ROCURONIUM BROMIDE 10 MG/ML
INJECTION, SOLUTION INTRAVENOUS AS NEEDED
Status: DISCONTINUED | OUTPATIENT
Start: 2017-10-10 | End: 2017-10-10 | Stop reason: SURG

## 2017-10-10 RX ORDER — OXYCODONE AND ACETAMINOPHEN 10; 325 MG/1; MG/1
1 TABLET ORAL ONCE
Status: COMPLETED | OUTPATIENT
Start: 2017-10-10 | End: 2017-10-10

## 2017-10-10 RX ORDER — MEPERIDINE HYDROCHLORIDE 50 MG/ML
50 INJECTION INTRAMUSCULAR; INTRAVENOUS; SUBCUTANEOUS ONCE
Status: COMPLETED | OUTPATIENT
Start: 2017-10-10 | End: 2017-10-10

## 2017-10-10 RX ORDER — ONDANSETRON 4 MG/1
4 TABLET, ORALLY DISINTEGRATING ORAL EVERY 6 HOURS PRN
Status: DISCONTINUED | OUTPATIENT
Start: 2017-10-10 | End: 2017-10-12 | Stop reason: HOSPADM

## 2017-10-10 RX ORDER — PROPOFOL 10 MG/ML
INJECTION, EMULSION INTRAVENOUS AS NEEDED
Status: DISCONTINUED | OUTPATIENT
Start: 2017-10-10 | End: 2017-10-10 | Stop reason: SURG

## 2017-10-10 RX ORDER — FONDAPARINUX SODIUM 2.5 MG/.5ML
2.5 INJECTION SUBCUTANEOUS
Status: DISCONTINUED | OUTPATIENT
Start: 2017-10-11 | End: 2017-10-12 | Stop reason: HOSPADM

## 2017-10-10 RX ORDER — CYANOCOBALAMIN 1000 UG/ML
1000 INJECTION, SOLUTION INTRAMUSCULAR; SUBCUTANEOUS
Status: DISCONTINUED | OUTPATIENT
Start: 2017-10-31 | End: 2017-10-12 | Stop reason: HOSPADM

## 2017-10-10 RX ORDER — HYDROCODONE BITARTRATE AND ACETAMINOPHEN 7.5; 325 MG/1; MG/1
1 TABLET ORAL EVERY 6 HOURS PRN
Status: DISCONTINUED | OUTPATIENT
Start: 2017-10-10 | End: 2017-10-12 | Stop reason: HOSPADM

## 2017-10-10 RX ORDER — MORPHINE SULFATE 2 MG/ML
2 INJECTION, SOLUTION INTRAMUSCULAR; INTRAVENOUS
Status: DISCONTINUED | OUTPATIENT
Start: 2017-10-10 | End: 2017-10-10 | Stop reason: HOSPADM

## 2017-10-10 RX ORDER — MORPHINE SULFATE 4 MG/ML
4 INJECTION, SOLUTION INTRAMUSCULAR; INTRAVENOUS
Status: DISCONTINUED | OUTPATIENT
Start: 2017-10-10 | End: 2017-10-12 | Stop reason: HOSPADM

## 2017-10-10 RX ORDER — CELECOXIB 100 MG/1
CAPSULE ORAL
Status: COMPLETED
Start: 2017-10-10 | End: 2017-10-10

## 2017-10-10 RX ORDER — IPRATROPIUM BROMIDE AND ALBUTEROL SULFATE 2.5; .5 MG/3ML; MG/3ML
SOLUTION RESPIRATORY (INHALATION)
Status: COMPLETED
Start: 2017-10-10 | End: 2017-10-10

## 2017-10-10 RX ORDER — ALBUTEROL SULFATE 2.5 MG/3ML
2.5 SOLUTION RESPIRATORY (INHALATION) ONCE AS NEEDED
Status: DISCONTINUED | OUTPATIENT
Start: 2017-10-10 | End: 2017-10-10 | Stop reason: HOSPADM

## 2017-10-10 RX ORDER — CEFAZOLIN SODIUM 1 G/3ML
INJECTION, POWDER, FOR SOLUTION INTRAMUSCULAR; INTRAVENOUS
Status: DISPENSED
Start: 2017-10-10 | End: 2017-10-11

## 2017-10-10 RX ORDER — PROMETHAZINE HYDROCHLORIDE 25 MG/1
25 TABLET ORAL ONCE AS NEEDED
Status: DISCONTINUED | OUTPATIENT
Start: 2017-10-10 | End: 2017-10-10 | Stop reason: HOSPADM

## 2017-10-10 RX ORDER — PROMETHAZINE HYDROCHLORIDE 25 MG/ML
6.25 INJECTION, SOLUTION INTRAMUSCULAR; INTRAVENOUS ONCE AS NEEDED
Status: DISCONTINUED | OUTPATIENT
Start: 2017-10-10 | End: 2017-10-10 | Stop reason: HOSPADM

## 2017-10-10 RX ORDER — ONDANSETRON 4 MG/1
4 TABLET, FILM COATED ORAL EVERY 6 HOURS PRN
Status: DISCONTINUED | OUTPATIENT
Start: 2017-10-10 | End: 2017-10-12 | Stop reason: HOSPADM

## 2017-10-10 RX ORDER — SODIUM CHLORIDE, SODIUM LACTATE, POTASSIUM CHLORIDE, CALCIUM CHLORIDE 600; 310; 30; 20 MG/100ML; MG/100ML; MG/100ML; MG/100ML
1000 INJECTION, SOLUTION INTRAVENOUS CONTINUOUS PRN
Status: DISCONTINUED | OUTPATIENT
Start: 2017-10-10 | End: 2017-10-10 | Stop reason: HOSPADM

## 2017-10-10 RX ORDER — OXYCODONE AND ACETAMINOPHEN 10; 325 MG/1; MG/1
TABLET ORAL
Status: COMPLETED
Start: 2017-10-10 | End: 2017-10-10

## 2017-10-10 RX ORDER — FAMOTIDINE 10 MG/ML
INJECTION, SOLUTION INTRAVENOUS
Status: COMPLETED
Start: 2017-10-10 | End: 2017-10-10

## 2017-10-10 RX ORDER — IPRATROPIUM BROMIDE AND ALBUTEROL SULFATE 2.5; .5 MG/3ML; MG/3ML
3 SOLUTION RESPIRATORY (INHALATION) ONCE
Status: COMPLETED | OUTPATIENT
Start: 2017-10-10 | End: 2017-10-10

## 2017-10-10 RX ORDER — BISACODYL 10 MG
10 SUPPOSITORY, RECTAL RECTAL DAILY PRN
Status: DISCONTINUED | OUTPATIENT
Start: 2017-10-10 | End: 2017-10-12 | Stop reason: HOSPADM

## 2017-10-10 RX ORDER — NALOXONE HCL 0.4 MG/ML
0.4 VIAL (ML) INJECTION
Status: DISCONTINUED | OUTPATIENT
Start: 2017-10-10 | End: 2017-10-12 | Stop reason: HOSPADM

## 2017-10-10 RX ORDER — ACETAMINOPHEN 325 MG/1
650 TABLET ORAL EVERY 12 HOURS PRN
Status: DISCONTINUED | OUTPATIENT
Start: 2017-10-10 | End: 2017-10-12 | Stop reason: HOSPADM

## 2017-10-10 RX ORDER — SODIUM CHLORIDE, SODIUM LACTATE, POTASSIUM CHLORIDE, CALCIUM CHLORIDE 600; 310; 30; 20 MG/100ML; MG/100ML; MG/100ML; MG/100ML
100 INJECTION, SOLUTION INTRAVENOUS CONTINUOUS
Status: DISCONTINUED | OUTPATIENT
Start: 2017-10-10 | End: 2017-10-12 | Stop reason: HOSPADM

## 2017-10-10 RX ORDER — ATORVASTATIN CALCIUM 10 MG/1
10 TABLET, FILM COATED ORAL NIGHTLY
Status: DISCONTINUED | OUTPATIENT
Start: 2017-10-10 | End: 2017-10-12 | Stop reason: HOSPADM

## 2017-10-10 RX ORDER — OMEPRAZOLE 20 MG/1
20 CAPSULE, DELAYED RELEASE ORAL EVERY MORNING
COMMUNITY

## 2017-10-10 RX ORDER — CELECOXIB 100 MG/1
200 CAPSULE ORAL ONCE
Status: COMPLETED | OUTPATIENT
Start: 2017-10-10 | End: 2017-10-10

## 2017-10-10 RX ORDER — CETIRIZINE HYDROCHLORIDE 10 MG/1
10 TABLET ORAL DAILY
Status: DISCONTINUED | OUTPATIENT
Start: 2017-10-11 | End: 2017-10-12 | Stop reason: HOSPADM

## 2017-10-10 RX ORDER — ONDANSETRON 2 MG/ML
4 INJECTION INTRAMUSCULAR; INTRAVENOUS ONCE
Status: DISCONTINUED | OUTPATIENT
Start: 2017-10-10 | End: 2017-10-10 | Stop reason: HOSPADM

## 2017-10-10 RX ORDER — HYDROCODONE BITARTRATE AND ACETAMINOPHEN 5; 325 MG/1; MG/1
1 TABLET ORAL AS NEEDED
COMMUNITY
Start: 2017-08-05 | End: 2017-10-12 | Stop reason: HOSPADM

## 2017-10-10 RX ORDER — SODIUM CHLORIDE 0.9 % (FLUSH) 0.9 %
3 SYRINGE (ML) INJECTION AS NEEDED
Status: DISCONTINUED | OUTPATIENT
Start: 2017-10-10 | End: 2017-10-10 | Stop reason: HOSPADM

## 2017-10-10 RX ORDER — FENTANYL CITRATE 50 UG/ML
INJECTION, SOLUTION INTRAMUSCULAR; INTRAVENOUS AS NEEDED
Status: DISCONTINUED | OUTPATIENT
Start: 2017-10-10 | End: 2017-10-10 | Stop reason: SURG

## 2017-10-10 RX ORDER — ONDANSETRON 2 MG/ML
4 INJECTION INTRAMUSCULAR; INTRAVENOUS EVERY 6 HOURS PRN
Status: DISCONTINUED | OUTPATIENT
Start: 2017-10-10 | End: 2017-10-12 | Stop reason: HOSPADM

## 2017-10-10 RX ORDER — ONDANSETRON 2 MG/ML
4 INJECTION INTRAMUSCULAR; INTRAVENOUS ONCE AS NEEDED
Status: DISCONTINUED | OUTPATIENT
Start: 2017-10-10 | End: 2017-10-10 | Stop reason: HOSPADM

## 2017-10-10 RX ORDER — PROMETHAZINE HYDROCHLORIDE 25 MG/1
25 SUPPOSITORY RECTAL ONCE AS NEEDED
Status: DISCONTINUED | OUTPATIENT
Start: 2017-10-10 | End: 2017-10-10 | Stop reason: HOSPADM

## 2017-10-10 RX ORDER — DOCUSATE SODIUM 100 MG/1
100 CAPSULE, LIQUID FILLED ORAL 2 TIMES DAILY PRN
Status: DISCONTINUED | OUTPATIENT
Start: 2017-10-10 | End: 2017-10-12 | Stop reason: HOSPADM

## 2017-10-10 RX ORDER — PANTOPRAZOLE SODIUM 40 MG/1
40 TABLET, DELAYED RELEASE ORAL EVERY MORNING
Status: DISCONTINUED | OUTPATIENT
Start: 2017-10-11 | End: 2017-10-12 | Stop reason: HOSPADM

## 2017-10-10 RX ADMIN — Medication 10 ML: at 18:26

## 2017-10-10 RX ADMIN — ACETAMINOPHEN 650 MG: 325 TABLET, FILM COATED ORAL at 13:05

## 2017-10-10 RX ADMIN — ATORVASTATIN CALCIUM 10 MG: 10 TABLET, FILM COATED ORAL at 21:08

## 2017-10-10 RX ADMIN — SODIUM CHLORIDE, POTASSIUM CHLORIDE, SODIUM LACTATE AND CALCIUM CHLORIDE: 600; 310; 30; 20 INJECTION, SOLUTION INTRAVENOUS at 15:50

## 2017-10-10 RX ADMIN — Medication 100 MCG: at 14:09

## 2017-10-10 RX ADMIN — OXYCODONE HYDROCHLORIDE AND ACETAMINOPHEN 1 TABLET: 10; 325 TABLET ORAL at 13:06

## 2017-10-10 RX ADMIN — CEFAZOLIN 1 G: 1 INJECTION, POWDER, FOR SOLUTION INTRAMUSCULAR; INTRAVENOUS; PARENTERAL at 20:50

## 2017-10-10 RX ADMIN — FENTANYL CITRATE 50 MCG: 50 INJECTION, SOLUTION INTRAMUSCULAR; INTRAVENOUS at 13:40

## 2017-10-10 RX ADMIN — CELECOXIB 200 MG: 100 CAPSULE ORAL at 13:06

## 2017-10-10 RX ADMIN — MEPERIDINE HYDROCHLORIDE 50 MG: 50 INJECTION, SOLUTION INTRAMUSCULAR; INTRAVENOUS; SUBCUTANEOUS at 16:27

## 2017-10-10 RX ADMIN — SODIUM CHLORIDE, POTASSIUM CHLORIDE, SODIUM LACTATE AND CALCIUM CHLORIDE 100 ML/HR: 600; 310; 30; 20 INJECTION, SOLUTION INTRAVENOUS at 18:16

## 2017-10-10 RX ADMIN — MORPHINE SULFATE 4 MG: 4 INJECTION INTRAVENOUS at 18:26

## 2017-10-10 RX ADMIN — SODIUM CHLORIDE, POTASSIUM CHLORIDE, SODIUM LACTATE AND CALCIUM CHLORIDE 1000 ML: 600; 310; 30; 20 INJECTION, SOLUTION INTRAVENOUS at 12:46

## 2017-10-10 RX ADMIN — HYDROCODONE BITARTRATE AND ACETAMINOPHEN 1 TABLET: 7.5; 325 TABLET ORAL at 21:08

## 2017-10-10 RX ADMIN — ROCURONIUM BROMIDE 30 MG: 10 INJECTION INTRAVENOUS at 13:44

## 2017-10-10 RX ADMIN — CEFAZOLIN SODIUM 2 G: 2 SOLUTION INTRAVENOUS at 13:47

## 2017-10-10 RX ADMIN — LIDOCAINE HYDROCHLORIDE 60 MG: 20 INJECTION, SOLUTION INTRAVENOUS at 13:44

## 2017-10-10 RX ADMIN — FAMOTIDINE 20 MG: 10 INJECTION, SOLUTION INTRAVENOUS at 12:46

## 2017-10-10 RX ADMIN — OXYCODONE AND ACETAMINOPHEN 1 TABLET: 10; 325 TABLET ORAL at 13:06

## 2017-10-10 RX ADMIN — IPRATROPIUM BROMIDE AND ALBUTEROL SULFATE 3 ML: 2.5; .5 SOLUTION RESPIRATORY (INHALATION) at 13:08

## 2017-10-10 RX ADMIN — PROPOFOL 150 MG: 10 INJECTION, EMULSION INTRAVENOUS at 13:44

## 2017-10-10 RX ADMIN — FENTANYL CITRATE 50 MCG: 50 INJECTION, SOLUTION INTRAMUSCULAR; INTRAVENOUS at 14:21

## 2017-10-10 RX ADMIN — IPRATROPIUM BROMIDE AND ALBUTEROL SULFATE 3 ML: .5; 3 SOLUTION RESPIRATORY (INHALATION) at 13:08

## 2017-10-10 RX ADMIN — ACETAMINOPHEN 650 MG: 325 TABLET ORAL at 13:05

## 2017-10-10 NOTE — ANESTHESIA PROCEDURE NOTES
Airway  Urgency: elective    Airway not difficult    General Information and Staff    Patient location during procedure: OR  CRNA: CARLITOS RADER    Indications and Patient Condition  Indications for airway management: airway protection    Preoxygenated: yes  MILS maintained throughout  Mask difficulty assessment: 1 - vent by mask    Final Airway Details  Final airway type: endotracheal airway      Successful airway: ETT  Cuffed: yes   Successful intubation technique: direct laryngoscopy  Facilitating devices/methods: intubating stylet  Endotracheal tube insertion site: oral  Blade: Curly  Blade size: #3  ETT size: 7.0 mm  Cormack-Lehane Classification: grade I - full view of glottis  Placement verified by: chest auscultation and capnometry   Cuff volume (mL): 6  Measured from: lips  ETT to lips (cm): 19  Number of attempts at approach: 1    Additional Comments  Teeth as pre-op

## 2017-10-10 NOTE — H&P (VIEW-ONLY)
Nicole Mack is a 61 y.o. right hand dominant female   is here today for a discussion of treatment plans, surgery, and rehabilitation.  Follow-up, Pain, and Pre-op Exam of the Left Knee       History of Present Illness      This is a chronic condition.  Patient presents for her preop evaluation in regards to left total knee replacement.  Patient states she has been dealing with left knee osteoarthritis for numerous years.  She denies injury or trauma.  Denies numbness or tingling to the left lower extremity.  Patient states she cannot tolerate cortisone injections.  She states she had an injection in her right knee years ago and had a very bad painful reaction and does not want another injection.  She does not want to try Visco supplementation injections.  She states she had great success with her right total knee replacement and would like to proceed with her left total knee.  She has tried over-the-counter anti-inflammatories, knee bracing, warm heat and ice packs with no relief.  She states the knee pain interferes with her daily activity  PAST MEDICAL HISTORY:    Past Medical History:   Diagnosis Date   • Arthritis of back    • Disease of thyroid gland    • Fracture     LEFT PINKY TOE AT PRESENT TIME, RIGHT ANKLE WHEN A TEENAGER   • GERD (gastroesophageal reflux disease)    • High cholesterol    • [place] (hard of hearing)     REPORTS NO HEARING AIDS   • Memory loss     REPORTS WAS RECENTLY DIAGNOSED BY PCP WITH HAVE MILD MEMORY LOSS, EARLY ONSET OF DEMENTIA   • Osteoarthritis    • Stroke syndrome     REPORTS CVA IN 2002. REPORTS WEAKNESS IN EXTREMITIES FROM THIS.    • Valgus deformity, not elsewhere classified, right knee    • Wears glasses    • Wears partial dentures     UPPER PLATE         Current Outpatient Prescriptions:   •  atorvastatin (LIPITOR) 10 MG tablet, Take  by mouth Daily., Disp: , Rfl:   •  meloxicam (MOBIC) 15 MG tablet, Take  by mouth Daily., Disp: , Rfl:   •  omeprazole (PRILOSEC) 20 MG  capsule, Take  by mouth Daily., Disp: , Rfl:   •  acetaminophen (TYLENOL) 500 MG tablet, Take  by mouth., Disp: , Rfl:   •  alendronate (FOSAMAX) 70 MG tablet, Take 70 mg by mouth Every 7 (Seven) Days., Disp: , Rfl:   •  amoxicillin (AMOXIL) 500 MG capsule, , Disp: , Rfl:   •  amoxicillin (AMOXIL) 500 MG tablet, Take 500 mg by mouth 3 (Three) Times a Day., Disp: , Rfl:   •  aspirin 325 MG EC tablet, Take  by mouth Daily., Disp: , Rfl:   •  aspirin 325 MG tablet, Take 325 mg by mouth daily, Disp: , Rfl:   •  atorvastatin (LIPITOR) 10 MG tablet, Take 10 mg by mouth Daily., Disp: , Rfl:   •  Calcium Carb-Cholecalciferol (CALCIUM 600-D PO), Take 600 mg by mouth Daily., Disp: , Rfl:   •  cetirizine (zyrTEC) 10 MG tablet, Take 10 mg by mouth Daily., Disp: , Rfl:   •  cyanocobalamin 1000 MCG/ML injection, Inject 1,000 mcg into the shoulder, thigh, or buttocks Every 28 (Twenty-Eight) Days., Disp: , Rfl:   •  cyanocobalamin 1000 MCG/ML injection, Cyanocobalamin 1000 MCG/ML Injection Solution  1 injection Qmonthly (1000 MCG/ML) Active, Disp: , Rfl:   •  HYDROcodone-acetaminophen (NORCO) 5-325 MG per tablet, Take 5 tablets by mouth Continuous As Needed for discomfort., Disp: , Rfl:   •  levothyroxine (SYNTHROID) 50 MCG tablet, Take  by mouth Daily., Disp: , Rfl:   •  levothyroxine (SYNTHROID, LEVOTHROID) 25 MCG tablet, Take 50 mcg by mouth Daily., Disp: , Rfl:   •  levothyroxine (SYNTHROID, LEVOTHROID) 50 MCG tablet, , Disp: , Rfl: 0  •  levothyroxine (SYNTHROID, LEVOTHROID) 75 MCG tablet, , Disp: , Rfl:   •  meloxicam (MOBIC) 15 MG tablet, , Disp: , Rfl:   •  ofloxacin (OCUFLOX) 0.3 % ophthalmic solution, , Disp: , Rfl: 2  •  ofloxacin (OCUFLOX) 0.3 % ophthalmic solution, Apply  to eye., Disp: , Rfl:   •  omeprazole (priLOSEC) 20 MG capsule, Take 20 mg by mouth Daily., Disp: , Rfl: 2  •  Vitamin D, Cholecalciferol, 1000 units capsule, Take  by mouth Daily., Disp: , Rfl:     Past Surgical History:   Procedure Laterality Date  "  • BREAST CYST EXCISION Left     REPORTS BENINGN, REPORTS NO RESTRICTIONS ON LUE   • CATARACT EXTRACTION W/ INTRAOCULAR LENS IMPLANT Left 7/27/2017    Procedure: CATARACT PHACO EXTRACTION WITH INTRAOCULAR LENS IMPLANT LEFT;  Surgeon: Jw Mansfield MD;  Location: [facility] OR;  Service:    • CATARACT EXTRACTION W/ INTRAOCULAR LENS IMPLANT Right 8/17/2017    Procedure: CATARACT PHACO EXTRACTION WITH INTRAOCULAR LENS IMPLANT RIGHT;  Surgeon: Jw Mansfield MD;  Location: [facility] OR;  Service:    • MOUTH SURGERY      ORAL EXTRACTIONS   • OOPHORECTOMY Left    • TOTAL KNEE ARTHROPLASTY Right 02/09/2016   • TUBAL ABDOMINAL LIGATION         Family History   Problem Relation Age of Onset   • Diabetes Father        Social History     Social History   • Marital status:      Spouse name: N/A   • Number of children: N/A   • Years of education: N/A     Occupational History   • Not on file.     Social History Main Topics   • Smoking status: Current Every Day Smoker     Packs/day: 1.00     Years: 42.00     Types: Cigarettes   • Smokeless tobacco: Never Used   • Alcohol use No   • Drug use: No   • Sexual activity: Defer     Other Topics Concern   • Not on file     Social History Narrative        Allergies   Allergen Reactions   • Bee Venom        Review of Systems   Constitutional: Negative for fever.   HENT: Negative for voice change.    Eyes: Negative for visual disturbance.   Respiratory: Negative for shortness of breath.    Cardiovascular: Negative for chest pain.   Gastrointestinal: Negative for abdominal distention and abdominal pain.   Genitourinary: Negative for dysuria.   Musculoskeletal: Positive for arthralgias. Negative for gait problem and joint swelling.   Skin: Negative for rash.   Neurological: Negative for speech difficulty.   Hematological: Does not bruise/bleed easily.   Psychiatric/Behavioral: Negative for confusion.       PHYSICAL EXAMINATION:       Resp 18  Ht 60\" (152.4 cm)  Wt 128 lb (58.1 kg)  BMI 25 " kg/m2    GENERAL [x] Well developed  []Ill appearing [x] No acute distress    HEENT [x]No acute changes [x] Normocephalic, atraumatic    NECK [x]Supple  [] No midline tenderness    LUNGS [x]Clear bilaterally [x]No wheezes []Rhonchi [] Rales    HEART [x] Regular rate  [x] Regular rhythm [] Irregular    ABDOMEN [x] Soft [x] Not tender [x] Not distended [x] Normal sounds    VAS/EXT [x] Normal Pulses []Edema []Cyanosis             SKIN [x] Warm [x]Dry []Pink []Ecchymosis []Cool    NEURO [x] Sensation Intact [x] Motor Intact [x] Pulse intact  [] Dysesthesias:_________  []Weakness:__________             Physical Exam   Constitutional: She is oriented to person, place, and time. She appears well-nourished.   Eyes: Conjunctivae are normal.   Neck: No tracheal deviation present.   Pulmonary/Chest: Effort normal. No respiratory distress.   Abdominal: Soft. She exhibits no distension. There is no tenderness.   Musculoskeletal:        Left knee: She exhibits no effusion.   Neurological: She is alert and oriented to person, place, and time.   Skin: No rash noted.   Psychiatric: She has a normal mood and affect. Her behavior is normal.   Vitals reviewed.    Left Knee Exam     Tenderness   The patient is experiencing tenderness in the lateral joint line, medial joint line and medial retinaculum.    Range of Motion   Left knee extension: 5.   Flexion: 100     Tests   Patellar Apprehension: trace    Other   Erythema: absent  Sensation: normal  Pulse: present  Swelling: mild  Effusion: no effusion present          Extremity DVT signs are Negative on physical exam with negative Joshua sign, with no calf pain, with no palpable cords, with no increased pain with passive stretch/extension and with no skin tone change   Neurologic Exam     Mental Status   Oriented to person, place, and time.     Ortho Exam      DVT Screening: No Yes   Smoker [x] []   Personal/Family History of DVT or PE [x] []   Sedentary Lifestyle [x] []   Overweight [x]  []       Previous or Active DVT/PE: NO  Cardiac Stent within 1 year: NO  Embolic/Ischemic stroke within 1 year: NO  Creatinine less than 30ml/min: NO  Congenital Thrombophilia: NO  Hypersensitivity to TXA: NO  Color Blindness: NO  NOTE:  TXA  tranexemic acid summary: THIS PATIENT IS A CANDIDATE FOR IV TXA DURING SURGERY.    Independent Review of Radiographic Studies:    Indication to evaluate joint condition, and compared with prior images, shows evident chronic advanced osteoarthritis.  Laboratory and Other Studies:  No new results reviewed today.     Risks, benefits, and alternative treatments discussed with the patient: [x] Yes [] No     Risk benefits and merits of the proposed surgery were discussed and the patient's questions were answered risks discussed including and not limited to:  Anesthesia reactions  Blood loss and possible transfusion  Infection  Deep venous thrombosis and pulmonary embolus  Nerve, vascular or tendon injury  Fracture  Deformity  Stiffness  Weakness  Prosthesis and implant issues such as loosening, material wear or dislocation  Skin necrosis  Revision surgery or further treatment  Recurrence of problem and condition     Informed consent: [] Signed  [x] To be obtained at hospital  [] Silver Rivera was seen today for follow-up, pain and pre-op exam.    Diagnoses and all orders for this visit:    Primary osteoarthritis of left knee  -     Inpatient Admission; Standing  -     Case Request; Standing  -     Provide instructions to patient regarding NPO status  -     Obtain informed consent  -     Clorhexidine skin prep  -     CBC and Differential  -     Comprehensive metabolic panel  -     Protime-INR  -     APTT  -     MRSA screen  -     Urinalysis w/Culture if Indicated  -     ECG 12 Lead  -     XR chest 2 vw  -     Follow anesthesia standing orders.; Standing  -     Verify NPO Status; Standing  -     TOMMIE hose- To be placed on patient in pre-op; Standing  -     SCD (sequential compression  device)- to be placed on patient in Pre-op; Standing  -     POC Glucose Fingerstick; Standing  -     Notify physician (specify); Standing  -     ceFAZolin (ANCEF) 2 g in sodium chloride 0.9 % 100 mL IVPB; Infuse 2 g into a venous catheter On Call to the Operating Room.  -     Tranexamic Acid 581 mg in sodium chloride 0.9 % 250 mL; Infuse 581 mg into a venous catheter On Call to the Operating Room.  -     Tranexamic Acid 581 mg in sodium chloride 0.9 % 250 mL; Infuse 581 mg into a venous catheter On Call to the Operating Room.  -     famotidine (PEPCID) injection 20 mg; Infuse 2 mL into a venous catheter On Call to the Operating Room.  -     Case Request    Pre-op testing  -     Inpatient Admission; Standing  -     Case Request; Standing  -     Provide instructions to patient regarding NPO status  -     Obtain informed consent  -     Clorhexidine skin prep  -     CBC and Differential  -     Comprehensive metabolic panel  -     Protime-INR  -     APTT  -     MRSA screen  -     Urinalysis w/Culture if Indicated  -     ECG 12 Lead  -     XR chest 2 vw  -     Follow anesthesia standing orders.; Standing  -     Verify NPO Status; Standing  -     TOMMIE hose- To be placed on patient in pre-op; Standing  -     SCD (sequential compression device)- to be placed on patient in Pre-op; Standing  -     POC Glucose Fingerstick; Standing  -     Notify physician (specify); Standing  -     ceFAZolin (ANCEF) 2 g in sodium chloride 0.9 % 100 mL IVPB; Infuse 2 g into a venous catheter On Call to the Operating Room.  -     Tranexamic Acid 581 mg in sodium chloride 0.9 % 250 mL; Infuse 581 mg into a venous catheter On Call to the Operating Room.  -     Tranexamic Acid 581 mg in sodium chloride 0.9 % 250 mL; Infuse 581 mg into a venous catheter On Call to the Operating Room.  -     famotidine (PEPCID) injection 20 mg; Infuse 2 mL into a venous catheter On Call to the Operating Room.  -     Case  Request       Recommendations/Plan:    Orthopedic activities reviewed and patient expressed appreciation  Discussion of orthopedic goals  Risk, benefits, and merits of treatment alternatives reviewed with the patient and questions answered  The nature of the proposed surgery reviewed with the patient including risks, benefits, rehabilitation, recovery timeframe, and outcome expectations    Surgery: Surgery proposed at this visit as noted., If symptoms and functional limitations persist with current treatment, patient to consider elective surgery. and For intractable painful limiting condition, patient to consider elective surgical options.  Patient is encouraged to call or return for any issues or concerns.    PLANNED SURGICAL PROCEDURE: Elective Left total knee replacement

## 2017-10-10 NOTE — ANESTHESIA PREPROCEDURE EVALUATION
Anesthesia Evaluation     Patient summary reviewed and Nursing notes reviewed   no history of anesthetic complications:  NPO Solid Status: > 8 hours  NPO Liquid Status: > 8 hours     Airway   Mallampati: I  TM distance: >3 FB  Neck ROM: full  Dental    (+) upper dentures        Pulmonary - normal exam   (+) a smoker Current Smoked day of surgery,   (-) COPD, asthma    ROS comment: CXR: NAD  Cardiovascular - normal exam  Exercise tolerance: good (4-7 METS)    ECG reviewed  PT is on anticoagulation therapy    (+) hyperlipidemia  (-) hypertension, past MI, CAD    ROS comment: EKG: NSR  PT on 325 mg Aspirin    Neuro/Psych  (+) CVA (weakness in extremities) residual symptoms, headaches (migraines), dementia (early onset dementia),      ROS Comment: lle weakness  GI/Hepatic/Renal/Endo    (+)  GERD well controlled, hypothyroidism,     Musculoskeletal     (+) back pain (scoliosis),   Abdominal    Substance History - negative use     OB/GYN negative ob/gyn ROS         Other   (+) arthritis (spine)     ROS/Med Hx Other: H/H 14.1/44.8  Plt: 379  K 4.2                                  Anesthesia Plan    ASA 3     general and regional   (Risks and benefits discussed including risk of aspiration, recall and dental damage. All patient questions answered. Will continue with POC.    Discussed risk of infection, bruising, tenderness at injection site, possible nerve damage and risk of failed block. All patient questions answered. Will continue with POC.  Discussed realistic expectation of postoperative pain management.  Informed consent obtained from patient preoperatively.      Pt did not want a SAB due to scoliosis. Pt does not like needle sticks and would prefer to have IV pain medications for POPC. Discussed risk of pain post-op and did consent for a rescue block if needed. )  intravenous induction   Anesthetic plan and risks discussed with patient.

## 2017-10-10 NOTE — OP NOTE
[facility] [address], [address]  [place]  [address]   [phone]      OPERATIVE REPORT      PATIENT NAME:  Nicole Mack                            YOB: 1955                     PREOP DIAGNOSIS: Left knee advanced degenerative joint disease    POSTOP DIAGNOSIS: Same.    PROCEDURE:  Left total knee replacement    SURGEON:   Jerrell Britt M.D.    FIRST ASSIST:  Circulator: Johann Regalado RN; Carrie Blank RN      Scrub Person: Chad Gilmore; Saúl Curiel; Patt Arango    ANESTHETIST:  CRNA: Oneal Villegas CRNA; Katrin Meredith CRNA    ANESTHESIA:   General    FLUIDS:   1000 ml crystalloid    ESTIM BLOOD LOSS: 75 ml    SPECIMENS:   Knee bone cuts sent to Pathology.    DRAINS:   None.    TOURNIQUET TIME:  52 min @ 275 mm Hg    HARDWARE: A [employer] cemented cruciate retaining total knee replacement with a size # 3 oxinium narrow femoral component, a size # 2 tibial component, 9 mm CR articulating dished polyethylene liner, and a 26 x 7.5 mm polyethylene patella    FINDINGS:                              End-stage DJD, osteophytes, erosions, cysts, bone-on-bone wear, three compartment involvement, significant valgus deformity, extensor lag and ligament imbalance.    COMPLICATIONS:  None    DISPOSITION:  Stable to recovery.     INDICATIONS AND NARRATIVE:  Patient present for elective knee arthroplasty to address painful intractable advanced degenerative joint disease with knee swelling, stiffness, deformity, instability and dysfunction.  The patient presents for planned elective total knee replacement.  Risks and benefits of the surgery were discussed and informed consent was obtained with the patient.  Risks discussed including but not limited to anesthesia, infection, nerve/vessel/tendon injury, fracture, prosthetic loosening or wear, blood loss and possible need for transfusion, DVT and pulmonary embolus.  Goals include pain  relief, improved knee alignment, improved knee range of motion and better function for ambulation and activities.      Antibiotic prophylaxis was given.  Tranexamic acid protocol followed.  Although not an absolute contraindication, caution is advised in patients with a history of prior stroke and so although the criteria for tranexamic acid (including no stroke within a year) could be met, it was elected not to give in this case.  Fortunately hemostasis throughout this procedure was excellent and blood loss considerably less than average in this case.  Surgeon site marking and a time out were performed prior to the procedure.  Anesthesia was effective and well tolerated.  The knee was prepped and draped in the usual sterile fashion.  A padded thigh tourniquet was placed for the procedure.    After sterile prep and drape, an anterior incision was made at the knee.  A medial para-patella arthrotomy was made and dissection continued proximally along the medial one-third of the quadriceps tendon and distally along the medial border of the patella tendon.  A partial inferior patella fat pad release was performed as well as a superior synovectomy.   Next, steps were taken to prepare the femoral, tibial and patella surfaces for the knee replacement implant.  Care was taken to incorporate 4 degrees of valgus and 3 degrees of external rotation in the femoral cut, neutral varus/valgus, neutral to slight external rotation and 3-5 degrees of posterior slope of the tibial cut, and the patella cut prepared to restore the original thickness with implants.  Equal flexion and extension gaps were achieved.  The minimally invasive instruments, sizing templates, cutting blocks, and guides were used together with radiographic x-ray templates.  The knee trial components achieved good alignment, fit, motion and stability. Soft tissue balancing was achieved in flexion and extension. The bone surfaces were thoroughly irrigated with  antibiotic pulsed irrigation.  The entry to the femoral canal was closed with a bone plug.      Next, using standard cement technique including vacuum mixing, centrifugation and pressurization, the actual prosthetic components as described above were cemented in place.  Again, a trial liner was placed to check for the best range of motion and stability of the knee.  The trial was removed and the actual polyethylene liner was placed onto the tibial tray and secured with the locking mechanism.  The knee was again taken through a final range of motion, alignment and stability check which was excellent.  There was also excellent patella tracking through the entire range of motion. The knee and leg showed good alignment.  The tourniquet was taken down and there was good hemostasis.  Minor venous bleeding was controlled with electrocautery.  The wound was irrigated copiously again as well as throughout the procedure and during closure with pulse lavage antibiotic irrigation.  Routine closure was performed consisting of interrupted #1 Vicryl sutures for the extensor mechanism, 2-0 Vicryl for deep and superficial subcutaneous layers and skin closed with running barbed absorbable subcuticular suture.  A sterile Dermabond and Covaderm dressing was applied.  Anesthesia was effective and well tolerated.  There were no complications with the procedure.  The patient was transferred stable to recovery.      Jerrell Britt MD  10/10/2017  4:14 PM

## 2017-10-10 NOTE — PLAN OF CARE
Problem: Patient Care Overview (Adult)  Goal: Plan of Care Review  Outcome: Ongoing (interventions implemented as appropriate)    10/10/17 1814   Coping/Psychosocial Response Interventions   Plan Of Care Reviewed With patient   Patient Care Overview   Progress no change   Outcome Evaluation   Outcome Summary/Follow up Plan new admission         Problem: Knee Replacement, Total (Adult)  Goal: Signs and Symptoms of Listed Potential Problems Will be Absent or Manageable (Knee Replacement, Total)  Outcome: Ongoing (interventions implemented as appropriate)    Problem: Fall Risk (Adult)  Goal: Absence of Falls  Outcome: Ongoing (interventions implemented as appropriate)

## 2017-10-10 NOTE — INTERVAL H&P NOTE
H&P reviewed. The patient was examined and there are no changes to the H&P.     /72 (BP Location: Left arm, Patient Position: Lying)  Pulse 62  Temp 97.8 °F (36.6 °C) (Temporal Artery )   Resp 18  LMP  (LMP Unknown) Comment: POSTMENOPAUSAL  SpO2 92%      Jerrell Britt MD  10/10/2017  1:34 PM

## 2017-10-10 NOTE — PLAN OF CARE
Problem: Perioperative Period (Adult)  Goal: Signs and Symptoms of Listed Potential Problems Will be Absent or Manageable (Perioperative Period)  Outcome: Ongoing (interventions implemented as appropriate)    10/10/17 1218   Perioperative Period   Problems Assessed (Perioperative Period) all   Problems Present (Perioperative Period) none

## 2017-10-10 NOTE — ANESTHESIA POSTPROCEDURE EVALUATION
Patient: Nicole Mack    Procedure Summary     Date Anesthesia Start Anesthesia Stop Room / Location    10/10/17 1339   [facility] /  [facility]       Procedure Diagnosis Surgeon Provider    ELECTIVE LEFT TOTAL KNEE ARTHROPLASTY (S&N) (Left Knee) Pre-op testing; Primary osteoarthritis of left knee  (Pre-op testing [Z01.818]; Primary osteoarthritis of left knee [M17.12]) MD Oneal Mallory CRNA          Anesthesia Type: general, regional  Last vitals  BP     166/65   Temp     97.9   Pulse    78   Resp     20   SpO2     97     Post Anesthesia Care and Evaluation    Patient location during evaluation: PACU  Patient participation: complete - patient participated  Level of consciousness: awake  Pain score: 3  Pain management: adequate  Airway patency: patent  Anesthetic complications: No anesthetic complications  PONV Status: controlled  Cardiovascular status: acceptable and stable  Respiratory status: acceptable and face mask  Hydration status: acceptable

## 2017-10-10 NOTE — PLAN OF CARE
Problem: Perioperative Period (Adult)  Intervention: Promote Pulmonary Hygiene and Secretion Clearance    10/10/17 1611   Activity   Activity Type bedrest   Promote Aggressive Pulmonary Hygiene/Secretion Management   Cough And Deep Breathing done independently per patient   Positioning   Head Of Bed (HOB) Position HOB at 20-30 degrees       Intervention: Monitor/Manage Pain    10/10/17 1611   Manage Acute Burn Pain   Pain Management Interventions cold applied       Intervention: Prevent [place]-procedural Injury    10/10/17 1611   Positioning   Positioning semi-Fowlers   Head Of Bed (HOB) Position HOB at 20-30 degrees       Intervention: Prevent/Manage DVT/VTE Risk    10/10/17 1611   Support Surgical/Anesthesia Recovery   Venous Thromboembolism Prevent/Manage compression stocking       10/10/17 1611   Support Surgical/Anesthesia Recovery   Venous Thromboembolism Prevent/Manage compression stockings on;sequential compression devices on   s on       Intervention: Monitor/Manage Postoperative Bleeding    10/10/17 1611   Safety Interventions   Bleeding Management dressing monitored       Intervention: Promote Normothermia    10/10/17 1611   Cardiac Interventions   Warming Thermoregulation Maintenance warm blankets applied;skin exposure time minimized

## 2017-10-11 LAB
ANION GAP SERPL CALCULATED.3IONS-SCNC: 13.6 MMOL/L
BASOPHILS # BLD AUTO: 0.03 10*3/MM3 (ref 0–0.2)
BASOPHILS NFR BLD AUTO: 0.3 % (ref 0–2.5)
BUN BLD-MCNC: 7 MG/DL (ref 7–20)
BUN/CREAT SERPL: 14 (ref 7.1–23.5)
CALCIUM SPEC-SCNC: 8.7 MG/DL (ref 8.4–10.2)
CHLORIDE SERPL-SCNC: 109 MMOL/L (ref 98–107)
CO2 SERPL-SCNC: 25 MMOL/L (ref 26–30)
CREAT BLD-MCNC: 0.5 MG/DL (ref 0.6–1.3)
DEPRECATED RDW RBC AUTO: 48.6 FL (ref 37–54)
EOSINOPHIL # BLD AUTO: 0 10*3/MM3 (ref 0–0.7)
EOSINOPHIL NFR BLD AUTO: 0 % (ref 0–7)
ERYTHROCYTE [DISTWIDTH] IN BLOOD BY AUTOMATED COUNT: 14.4 % (ref 11.5–14.5)
GFR SERPL CREATININE-BSD FRML MDRD: 125 ML/MIN/1.73
GLUCOSE BLD-MCNC: 106 MG/DL (ref 74–98)
HCT VFR BLD AUTO: 35.8 % (ref 37–47)
HGB BLD-MCNC: 11.5 G/DL (ref 12–16)
IMM GRANULOCYTES # BLD: 0.02 10*3/MM3 (ref 0–0.06)
IMM GRANULOCYTES NFR BLD: 0.2 % (ref 0–0.6)
LYMPHOCYTES # BLD AUTO: 2.02 10*3/MM3 (ref 0.6–3.4)
LYMPHOCYTES NFR BLD AUTO: 20.6 % (ref 10–50)
MCH RBC QN AUTO: 29.6 PG (ref 27–31)
MCHC RBC AUTO-ENTMCNC: 32.1 G/DL (ref 30–37)
MCV RBC AUTO: 92 FL (ref 81–99)
MONOCYTES # BLD AUTO: 1.03 10*3/MM3 (ref 0–0.9)
MONOCYTES NFR BLD AUTO: 10.5 % (ref 0–12)
NEUTROPHILS # BLD AUTO: 6.69 10*3/MM3 (ref 2–6.9)
NEUTROPHILS NFR BLD AUTO: 68.4 % (ref 37–80)
NRBC BLD MANUAL-RTO: 0 /100 WBC (ref 0–0)
PLATELET # BLD AUTO: 280 10*3/MM3 (ref 130–400)
PMV BLD AUTO: 11 FL (ref 6–12)
POTASSIUM BLD-SCNC: 3.6 MMOL/L (ref 3.5–5.1)
RBC # BLD AUTO: 3.89 10*6/MM3 (ref 4.2–5.4)
SODIUM BLD-SCNC: 144 MMOL/L (ref 137–145)
WBC NRBC COR # BLD: 9.79 10*3/MM3 (ref 4.8–10.8)

## 2017-10-11 PROCEDURE — 97110 THERAPEUTIC EXERCISES: CPT

## 2017-10-11 PROCEDURE — 97530 THERAPEUTIC ACTIVITIES: CPT

## 2017-10-11 PROCEDURE — 25010000002 FONDAPARINUX PER 0.5 MG: Performed by: ORTHOPAEDIC SURGERY

## 2017-10-11 PROCEDURE — 97165 OT EVAL LOW COMPLEX 30 MIN: CPT

## 2017-10-11 PROCEDURE — 80048 BASIC METABOLIC PNL TOTAL CA: CPT | Performed by: ORTHOPAEDIC SURGERY

## 2017-10-11 PROCEDURE — 94799 UNLISTED PULMONARY SVC/PX: CPT

## 2017-10-11 PROCEDURE — 25010000002 MORPHINE PER 10 MG: Performed by: ORTHOPAEDIC SURGERY

## 2017-10-11 PROCEDURE — 85025 COMPLETE CBC W/AUTO DIFF WBC: CPT | Performed by: ORTHOPAEDIC SURGERY

## 2017-10-11 PROCEDURE — 99024 POSTOP FOLLOW-UP VISIT: CPT | Performed by: PHYSICIAN ASSISTANT

## 2017-10-11 PROCEDURE — 97116 GAIT TRAINING THERAPY: CPT

## 2017-10-11 PROCEDURE — 99222 1ST HOSP IP/OBS MODERATE 55: CPT | Performed by: INTERNAL MEDICINE

## 2017-10-11 RX ADMIN — FONDAPARINUX SODIUM 2.5 MG: 2.5 INJECTION, SOLUTION SUBCUTANEOUS at 09:35

## 2017-10-11 RX ADMIN — ATORVASTATIN CALCIUM 10 MG: 10 TABLET, FILM COATED ORAL at 20:09

## 2017-10-11 RX ADMIN — PANTOPRAZOLE SODIUM 40 MG: 40 TABLET, DELAYED RELEASE ORAL at 06:22

## 2017-10-11 RX ADMIN — CETIRIZINE HYDROCHLORIDE 10 MG: 10 TABLET, FILM COATED ORAL at 09:35

## 2017-10-11 RX ADMIN — HYDROCODONE BITARTRATE AND ACETAMINOPHEN 1 TABLET: 7.5; 325 TABLET ORAL at 06:23

## 2017-10-11 RX ADMIN — LEVOTHYROXINE SODIUM 75 MCG: 75 TABLET ORAL at 06:23

## 2017-10-11 RX ADMIN — MORPHINE SULFATE 4 MG: 4 INJECTION INTRAVENOUS at 03:25

## 2017-10-11 RX ADMIN — HYDROCODONE BITARTRATE AND ACETAMINOPHEN 1 TABLET: 7.5; 325 TABLET ORAL at 17:59

## 2017-10-11 RX ADMIN — SODIUM CHLORIDE, POTASSIUM CHLORIDE, SODIUM LACTATE AND CALCIUM CHLORIDE 100 ML/HR: 600; 310; 30; 20 INJECTION, SOLUTION INTRAVENOUS at 03:25

## 2017-10-11 RX ADMIN — DOCUSATE SODIUM 100 MG: 100 CAPSULE, LIQUID FILLED ORAL at 11:24

## 2017-10-11 RX ADMIN — MORPHINE SULFATE 4 MG: 4 INJECTION INTRAVENOUS at 11:24

## 2017-10-11 RX ADMIN — CEFAZOLIN 1 G: 1 INJECTION, POWDER, FOR SOLUTION INTRAMUSCULAR; INTRAVENOUS; PARENTERAL at 04:54

## 2017-10-11 NOTE — PLAN OF CARE
Problem: Patient Care Overview (Adult)  Goal: Plan of Care Review  Outcome: Ongoing (interventions implemented as appropriate)    10/11/17 1201   Coping/Psychosocial Response Interventions   Plan Of Care Reviewed With patient   Patient Care Overview   Progress no change   Outcome Evaluation   Outcome Summary/Follow up Plan OT eval completed. Patient presents deficits in strength, endurance, balance, mobility and ADL performance. Patient is expected to benefit from OT services to improve overall functional performance and mobility prior to DC.          Problem: Inpatient Occupational Therapy  Goal: Bed Mobility Goal LTG- OT  Outcome: Ongoing (interventions implemented as appropriate)    10/11/17 1201   Bed Mobility OT LTG   Bed Mobility OT LTG, Date Established 10/11/17   Bed Mobility OT LTG, Time to Achieve 2 wks   Bed Mobility OT LTG, Activity Type supine to sit/sit to supine   Bed Mobility OT LTG, [place] Level contact guard assist   Bed Mobility OT LTG, Assist Device bed rails   Bed Mobility OT LTG, Outcome goal ongoing       Goal: Strength Goal LTG- OT  Outcome: Ongoing (interventions implemented as appropriate)    10/11/17 1201   Strength OT LTG   Strength Goal OT LTG, Date Established 10/11/17   Strength Goal OT LTG, Time to Achieve 2 wks   Strength Goal OT LTG, Measure to Achieve patient will perform 15 reps UB ther ex using red therband in order to increase strength and endurance.    Strength Goal OT LTG, Outcome goal ongoing       Goal: Patient Education Goal LTG- OT  Outcome: Ongoing (interventions implemented as appropriate)    10/11/17 1201   Patient Education OT LTG   Patient Education OT LTG, Date Established 10/11/17   Patient Education OT LTG, Time to Achieve 2 wks   Patient Education OT LTG, Education Type adaptive equipment mgmt   Patient Education OT LTG, Education Understanding demonstrates adequately;verbalizes understanding   Patient Education OT LTG Outcome goal ongoing       Goal: LB  Dressing Goal LTG- OT  Outcome: Ongoing (interventions implemented as appropriate)    10/11/17 1201   LB Dressing OT LTG   LB Dressing Goal OT LTG, Date Established 10/11/17   LB Dressing Goal OT LTG, Time to Achieve 2 wks   LB Dressing Goal OT LTG, [place] Level supervision required   LB Dressing Goal OT LTG, Adaptive Equipment reacher;sock-aid   LB Dressing Goal OT LTG, Outcome goal ongoing       Goal: Functional Mobility Goal LTG- OT  Outcome: Ongoing (interventions implemented as appropriate)    10/11/17 1201   Functional Mobility OT LTG   Functional Mobility Goal OT LTG, Date Established 10/11/17   Functional Mobility Goal OT LTG, Time to Achieve 2 wks   Functional Mobility Goal OT LTG, [place] Level contact guard   Functional Mobility Goal OT LTG, Assist Device rolling walker   Functional Mobility Goal OT LTG, Distance to Achieve in hallway   Functional Mobility Goal OT LTG, Additional Goal 150   Functional Mobility Goal OT LTG, Outcome goal ongoing

## 2017-10-11 NOTE — PLAN OF CARE
Problem: Patient Care Overview (Adult)  Goal: Plan of Care Review  Outcome: Ongoing (interventions implemented as appropriate)    10/11/17 1213   Coping/Psychosocial Response Interventions   Plan Of Care Reviewed With patient   Patient Care Overview   Progress improving   Outcome Evaluation   Outcome Summary/Follow up Plan patient ambulatin with PT; pain controlled with medications         Problem: Knee Replacement, Total (Adult)  Goal: Signs and Symptoms of Listed Potential Problems Will be Absent or Manageable (Knee Replacement, Total)  Outcome: Ongoing (interventions implemented as appropriate)    Problem: Fall Risk (Adult)  Goal: Absence of Falls  Outcome: Ongoing (interventions implemented as appropriate)

## 2017-10-11 NOTE — PAYOR COMM NOTE
"TO:[facility]   FROM:SILVA WASHINGTON, RN PHONE [phone]  INPT CLINICALS    Nicole Mack (61 y.o. Female)     Date of Birth Social Security Number Address Home Phone MRN    [id number]  [id number]   [facility] [address] [phone] [id number]    [employer] Marital Status          [employer]        Admission Date Admission Type Admitting Provider Attending Provider Department, Room/Bed    10/10/17 Elective Jerrell Britt MD Cervoni, Thomas D., MD [facility] SURG  4, 408/1    Discharge Date Discharge Disposition Discharge Destination                      Attending Provider: Jerrlel Britt MD     Allergies:  Bee Venom    Isolation:  None   Infection:  None   Code Status:  FULL    Ht:  58\" (147.3 cm)   Wt:  135 lb (61.2 kg)    Admission Cmt:  None   Principal Problem:  None                Active Insurance as of 10/10/2017     Primary Coverage     Payor Plan Insurance Group Employer/Plan Group    [employer] [place] [employer]      Payor Plan Address Payor Plan Phone Number Effective From Effective To    [address] [address] [phone] 8/5/2016     [place] [address]       Subscriber Name Subscriber Birth Date Member ID       NICOLE MACK [id number]                 Emergency Contacts      (Rel.) Home Phone Work Phone Mobile Phone    Shannon Mack (Relative) -- -- [phone]    Emma Walker (Daughter) [phone] -- --               History & Physical      Carlo Hutson PA-C at 9/25/2017  9:45 AM          Nicole Mack is a 61 y.o. right hand dominant female   is here today for a discussion of treatment plans, surgery, and rehabilitation.  Follow-up, Pain, and Pre-op Exam of the Left Knee       History of Present Illness      This is a chronic condition.  Patient presents for her preop evaluation in regards to left total knee replacement.  Patient states she has been dealing with left knee osteoarthritis for numerous " years.  She denies injury or trauma.  Denies numbness or tingling to the left lower extremity.  Patient states she cannot tolerate cortisone injections.  She states she had an injection in her right knee years ago and had a very bad painful reaction and does not want another injection.  She does not want to try Visco supplementation injections.  She states she had great success with her right total knee replacement and would like to proceed with her left total knee.  She has tried over-the-counter anti-inflammatories, knee bracing, warm heat and ice packs with no relief.  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 REPORTS WEAKNESS IN EXTREMITIES FROM THIS.    • Valgus deformity, not elsewhere classified, right knee    • Wears glasses    • Wears partial dentures     UPPER PLATE         Current Outpatient Prescriptions:   •  atorvastatin (LIPITOR) 10 MG tablet, Take  by mouth Daily., Disp: , Rfl:   •  meloxicam (MOBIC) 15 MG tablet, Take  by mouth Daily., Disp: , Rfl:   •  omeprazole (PRILOSEC) 20 MG capsule, Take  by mouth Daily., Disp: , Rfl:   •  acetaminophen (TYLENOL) 500 MG tablet, Take  by mouth., Disp: , Rfl:   •  alendronate (FOSAMAX) 70 MG tablet, Take 70 mg by mouth Every 7 (Seven) Days., Disp: , Rfl:   •  amoxicillin (AMOXIL) 500 MG capsule, , Disp: , Rfl:   •  amoxicillin (AMOXIL) 500 MG tablet, Take 500 mg by mouth 3 (Three) Times a Day., Disp: , Rfl:   •  aspirin 325 MG EC tablet, Take  by mouth Daily.,  Disp: , Rfl:   •  aspirin 325 MG tablet, Take 325 mg by mouth daily, Disp: , Rfl:   •  atorvastatin (LIPITOR) 10 MG tablet, Take 10 mg by mouth Daily., Disp: , Rfl:   •  Calcium Carb-Cholecalciferol (CALCIUM 600-D PO), Take 600 mg by mouth Daily., Disp: , Rfl:   •  cetirizine (zyrTEC) 10 MG tablet, Take 10 mg by mouth Daily., Disp: , Rfl:   •  cyanocobalamin 1000 MCG/ML injection, Inject 1,000 mcg into the shoulder, thigh, or buttocks Every 28 (Twenty-Eight) Days., Disp: , Rfl:   •  cyanocobalamin 1000 MCG/ML injection, Cyanocobalamin 1000 MCG/ML Injection Solution  1 injection Qmonthly (1000 MCG/ML) Active, Disp: , Rfl:   •  HYDROcodone-acetaminophen (NORCO) 5-325 MG per tablet, Take 5 tablets by mouth Continuous As Needed for discomfort., Disp: , Rfl:   •  levothyroxine (SYNTHROID) 50 MCG tablet, Take  by mouth Daily., Disp: , Rfl:   •  levothyroxine (SYNTHROID, LEVOTHROID) 25 MCG tablet, Take 50 mcg by mouth Daily., Disp: , Rfl:   •  levothyroxine (SYNTHROID, LEVOTHROID) 50 MCG tablet, , Disp: , Rfl: 0  •  levothyroxine (SYNTHROID, LEVOTHROID) 75 MCG tablet, , Disp: , Rfl:   •  meloxicam (MOBIC) 15 MG tablet, , Disp: , Rfl:   •  ofloxacin (OCUFLOX) 0.3 % ophthalmic solution, , Disp: , Rfl: 2  •  ofloxacin (OCUFLOX) 0.3 % ophthalmic solution, Apply  to eye., Disp: , Rfl:   •  omeprazole (priLOSEC) 20 MG capsule, Take 20 mg by mouth Daily., Disp: , Rfl: 2  •  Vitamin D, Cholecalciferol, 1000 units capsule, Take  by mouth Daily., Disp: , Rfl:     Past Surgical History:   Procedure Laterality Date   • BREAST CYST EXCISION Left     REPORTS BENINGN, REPORTS NO RESTRICTIONS ON LUE   • CATARACT EXTRACTION W/ INTRAOCULAR LENS IMPLANT Left 7/27/2017    Procedure: CATARACT PHACO EXTRACTION WITH INTRAOCULAR LENS IMPLANT LEFT;  Surgeon: Jw Mansfield MD;  Location: [facility];  Service:    • CATARACT EXTRACTION W/ INTRAOCULAR LENS IMPLANT Right 8/17/2017    Procedure: CATARACT PHACO EXTRACTION WITH INTRAOCULAR LENS IMPLANT  "RIGHT;  Surgeon: Jw Mansfield MD;  Location: [facility] OR;  Service:    • MOUTH SURGERY      ORAL EXTRACTIONS   • OOPHORECTOMY Left    • TOTAL KNEE ARTHROPLASTY Right 02/09/2016   • TUBAL ABDOMINAL LIGATION         Family History   Problem Relation Age of Onset   • Diabetes Father        Social History     Social History   • Marital status:      Spouse name: N/A   • Number of children: N/A   • Years of education: N/A     Occupational History   • Not on file.     Social History Main Topics   • Smoking status: Current Every Day Smoker     Packs/day: 1.00     Years: 42.00     Types: Cigarettes   • Smokeless tobacco: Never Used   • Alcohol use No   • Drug use: No   • Sexual activity: Defer     Other Topics Concern   • Not on file     Social History Narrative        Allergies   Allergen Reactions   • Bee Venom        Review of Systems   Constitutional: Negative for fever.   HENT: Negative for voice change.    Eyes: Negative for visual disturbance.   Respiratory: Negative for shortness of breath.    Cardiovascular: Negative for chest pain.   Gastrointestinal: Negative for abdominal distention and abdominal pain.   Genitourinary: Negative for dysuria.   Musculoskeletal: Positive for arthralgias. Negative for gait problem and joint swelling.   Skin: Negative for rash.   Neurological: Negative for speech difficulty.   Hematological: Does not bruise/bleed easily.   Psychiatric/Behavioral: Negative for confusion.       PHYSICAL EXAMINATION:       Resp 18  Ht 60\" (152.4 cm)  Wt 128 lb (58.1 kg)  BMI 25 kg/m2    GENERAL [x] Well developed  []Ill appearing [x] No acute distress    HEENT [x]No acute changes [x] Normocephalic, atraumatic    NECK [x]Supple  [] No midline tenderness    LUNGS [x]Clear bilaterally [x]No wheezes []Rhonchi [] Rales    HEART [x] Regular rate  [x] Regular rhythm [] Irregular    ABDOMEN [x] Soft [x] Not tender [x] Not distended [x] Normal sounds    VAS/EXT [x] Normal Pulses []Edema []Cyanosis          "    SKIN [x] Warm [x]Dry []Pink []Ecchymosis []Cool    NEURO [x] Sensation Intact [x] Motor Intact [x] Pulse intact  [] Dysesthesias:_________  []Weakness:__________             Physical Exam   Constitutional: She is oriented to person, place, and time. She appears well-nourished.   Eyes: Conjunctivae are normal.   Neck: No tracheal deviation present.   Pulmonary/Chest: Effort normal. No respiratory distress.   Abdominal: Soft. She exhibits no distension. There is no tenderness.   Musculoskeletal:        Left knee: She exhibits no effusion.   Neurological: She is alert and oriented to person, place, and time.   Skin: No rash noted.   Psychiatric: She has a normal mood and affect. Her behavior is normal.   Vitals reviewed.    Left Knee Exam     Tenderness   The patient is experiencing tenderness in the lateral joint line, medial joint line and medial retinaculum.    Range of Motion   Left knee extension: 5.   Flexion: 100     Tests   Patellar Apprehension: trace    Other   Erythema: absent  Sensation: normal  Pulse: present  Swelling: mild  Effusion: no effusion present          Extremity DVT signs are Negative on physical exam with negative Joshua sign, with no calf pain, with no palpable cords, with no increased pain with passive stretch/extension and with no skin tone change   Neurologic Exam     Mental Status   Oriented to person, place, and time.     Ortho Exam      DVT Screening: No Yes   Smoker [x] []   Personal/Family History of DVT or PE [x] []   Sedentary Lifestyle [x] []   Overweight [x] []       Previous or Active DVT/PE: NO  Cardiac Stent within 1 year: NO  Embolic/Ischemic stroke within 1 year: NO  Creatinine less than 30ml/min: NO  Congenital Thrombophilia: NO  Hypersensitivity to TXA: NO  Color Blindness: NO  NOTE:  TXA  tranexemic acid summary: THIS PATIENT IS A CANDIDATE FOR IV TXA DURING SURGERY.    Independent Review of Radiographic Studies:    Indication to evaluate joint condition, and compared  with prior images, shows evident chronic advanced osteoarthritis.  Laboratory and Other Studies:  No new results reviewed today.     Risks, benefits, and alternative treatments discussed with the patient: [x] Yes [] No     Risk benefits and merits of the proposed surgery were discussed and the patient's questions were answered risks discussed including and not limited to:  Anesthesia reactions  Blood loss and possible transfusion  Infection  Deep venous thrombosis and pulmonary embolus  Nerve, vascular or tendon injury  Fracture  Deformity  Stiffness  Weakness  Prosthesis and implant issues such as loosening, material wear or dislocation  Skin necrosis  Revision surgery or further treatment  Recurrence of problem and condition     Informed consent: [] Signed  [x] To be obtained at hospital  [] Both    Nicole was seen today for follow-up, pain and pre-op exam.    Diagnoses and all orders for this visit:    Primary osteoarthritis of left knee  -     Inpatient Admission; Standing  -     Case Request; Standing  -     Provide instructions to patient regarding NPO status  -     Obtain informed consent  -     Clorhexidine skin prep  -     CBC and Differential  -     Comprehensive metabolic panel  -     Protime-INR  -     APTT  -     MRSA screen  -     Urinalysis w/Culture if Indicated  -     ECG 12 Lead  -     XR chest 2 vw  -     Follow anesthesia standing orders.; Standing  -     Verify NPO Status; Standing  -     TOMMIE hose- To be placed on patient in pre-op; Standing  -     SCD (sequential compression device)- to be placed on patient in Pre-op; Standing  -     POC Glucose Fingerstick; Standing  -     Notify physician (specify); Standing  -     ceFAZolin (ANCEF) 2 g in sodium chloride 0.9 % 100 mL IVPB; Infuse 2 g into a venous catheter On Call to the Operating Room.  -     Tranexamic Acid 581 mg in sodium chloride 0.9 % 250 mL; Infuse 581 mg into a venous catheter On Call to the Operating Room.  -     Tranexamic Acid  581 mg in sodium chloride 0.9 % 250 mL; Infuse 581 mg into a venous catheter On Call to the Operating Room.  -     famotidine (PEPCID) injection 20 mg; Infuse 2 mL into a venous catheter On Call to the Operating Room.  -     Case Request    Pre-op testing  -     Inpatient Admission; Standing  -     Case Request; Standing  -     Provide instructions to patient regarding NPO status  -     Obtain informed consent  -     Clorhexidine skin prep  -     CBC and Differential  -     Comprehensive metabolic panel  -     Protime-INR  -     APTT  -     MRSA screen  -     Urinalysis w/Culture if Indicated  -     ECG 12 Lead  -     XR chest 2 vw  -     Follow anesthesia standing orders.; Standing  -     Verify NPO Status; Standing  -     TOMMIE hose- To be placed on patient in pre-op; Standing  -     SCD (sequential compression device)- to be placed on patient in Pre-op; Standing  -     POC Glucose Fingerstick; Standing  -     Notify physician (specify); Standing  -     ceFAZolin (ANCEF) 2 g in sodium chloride 0.9 % 100 mL IVPB; Infuse 2 g into a venous catheter On Call to the Operating Room.  -     Tranexamic Acid 581 mg in sodium chloride 0.9 % 250 mL; Infuse 581 mg into a venous catheter On Call to the Operating Room.  -     Tranexamic Acid 581 mg in sodium chloride 0.9 % 250 mL; Infuse 581 mg into a venous catheter On Call to the Operating Room.  -     famotidine (PEPCID) injection 20 mg; Infuse 2 mL into a venous catheter On Call to the Operating Room.  -     Case Request       Recommendations/Plan:    Orthopedic activities reviewed and patient expressed appreciation  Discussion of orthopedic goals  Risk, benefits, and merits of treatment alternatives reviewed with the patient and questions answered  The nature of the proposed surgery reviewed with the patient including risks, benefits, rehabilitation, recovery timeframe, and outcome expectations    Surgery: Surgery proposed at this visit as noted., If symptoms and functional  limitations persist with current treatment, patient to consider elective surgery. and For intractable painful limiting condition, patient to consider elective surgical options.  Patient is encouraged to call or return for any issues or concerns.    PLANNED SURGICAL PROCEDURE: Elective Left total knee replacement           Electronically signed by Carlo Hutson PA-C at 9/25/2017 10:16 AM      Jerrell Britt MD at 10/10/2017  1:34 PM          H&P reviewed. The patient was examined and there are no changes to the H&P.     /72 (BP Location: Left arm, Patient Position: Lying)  Pulse 62  Temp 97.8 °F (36.6 °C) (Temporal Artery )   Resp 18  LMP  (LMP Unknown) Comment: POSTMENOPAUSAL  SpO2 92%      Jerrell Britt MD  10/10/2017  1:34 PM       Electronically signed by Jerrell Britt MD at 10/10/2017  1:34 PM    Source Note             Nicole Mack is a 61 y.o. right hand dominant female   is here today for a discussion of treatment plans, surgery, and rehabilitation.  Follow-up, Pain, and Pre-op Exam of the Left Knee       History of Present Illness      This is a chronic condition.  Patient presents for her preop evaluation in regards to left total knee replacement.  Patient states she has been dealing with left knee osteoarthritis for numerous years.  She denies injury or trauma.  Denies numbness or tingling to the left lower extremity.  Patient states she cannot tolerate cortisone injections.  She states she had an injection in her right knee years ago and had a very bad painful reaction and does not want another injection.  She does not want to try Visco supplementation injections.  She states she had great success with her right total knee replacement and would like to proceed with her left total knee.  She has tried over-the-counter anti-inflammatories, knee bracing, warm heat and ice packs with no relief.  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 REPORTS WEAKNESS IN EXTREMITIES FROM THIS.    • Valgus deformity, not elsewhere classified, right knee    • Wears glasses    • Wears partial dentures     UPPER PLATE         Current Outpatient Prescriptions:   •  atorvastatin (LIPITOR) 10 MG tablet, Take  by mouth Daily., Disp: , Rfl:   •  meloxicam (MOBIC) 15 MG tablet, Take  by mouth Daily., Disp: , Rfl:   •  omeprazole (PRILOSEC) 20 MG capsule, Take  by mouth Daily., Disp: , Rfl:   •  acetaminophen (TYLENOL) 500 MG tablet, Take  by mouth., Disp: , Rfl:   •  alendronate (FOSAMAX) 70 MG tablet, Take 70 mg by mouth Every 7 (Seven) Days., Disp: , Rfl:   •  amoxicillin (AMOXIL) 500 MG capsule, , Disp: , Rfl:   •  amoxicillin (AMOXIL) 500 MG tablet, Take 500 mg by mouth 3 (Three) Times a Day., Disp: , Rfl:   •  aspirin 325 MG EC tablet, Take  by mouth Daily., Disp: , Rfl:   •  aspirin 325 MG tablet, Take 325 mg by mouth daily, Disp: , Rfl:   •  atorvastatin (LIPITOR) 10 MG tablet, Take 10 mg by mouth Daily., Disp: , Rfl:   •  Calcium Carb-Cholecalciferol (CALCIUM 600-D PO), Take 600 mg by mouth Daily., Disp: , Rfl:   •  cetirizine (zyrTEC) 10 MG tablet, Take 10 mg by mouth Daily., Disp: , Rfl:   •  cyanocobalamin 1000 MCG/ML injection, Inject 1,000 mcg into the shoulder, thigh, or buttocks Every 28 (Twenty-Eight) Days., Disp: , Rfl:   •  cyanocobalamin 1000 MCG/ML injection, Cyanocobalamin 1000 MCG/ML Injection Solution  1 injection Qmonthly (1000 MCG/ML) Active, Disp: , Rfl:   •   HYDROcodone-acetaminophen (NORCO) 5-325 MG per tablet, Take 5 tablets by mouth Continuous As Needed for discomfort., Disp: , Rfl:   •  levothyroxine (SYNTHROID) 50 MCG tablet, Take  by mouth Daily., Disp: , Rfl:   •  levothyroxine (SYNTHROID, LEVOTHROID) 25 MCG tablet, Take 50 mcg by mouth Daily., Disp: , Rfl:   •  levothyroxine (SYNTHROID, LEVOTHROID) 50 MCG tablet, , Disp: , Rfl: 0  •  levothyroxine (SYNTHROID, LEVOTHROID) 75 MCG tablet, , Disp: , Rfl:   •  meloxicam (MOBIC) 15 MG tablet, , Disp: , Rfl:   •  ofloxacin (OCUFLOX) 0.3 % ophthalmic solution, , Disp: , Rfl: 2  •  ofloxacin (OCUFLOX) 0.3 % ophthalmic solution, Apply  to eye., Disp: , Rfl:   •  omeprazole (priLOSEC) 20 MG capsule, Take 20 mg by mouth Daily., Disp: , Rfl: 2  •  Vitamin D, Cholecalciferol, 1000 units capsule, Take  by mouth Daily., Disp: , Rfl:     Past Surgical History:   Procedure Laterality Date   • BREAST CYST EXCISION Left     REPORTS BENJAIMIEN, REPORTS NO RESTRICTIONS ON LUE   • CATARACT EXTRACTION W/ INTRAOCULAR LENS IMPLANT Left 7/27/2017    Procedure: CATARACT PHACO EXTRACTION WITH INTRAOCULAR LENS IMPLANT LEFT;  Surgeon: Jw Mansfield MD;  Location: [facility];  Service:    • CATARACT EXTRACTION W/ INTRAOCULAR LENS IMPLANT Right 8/17/2017    Procedure: CATARACT PHACO EXTRACTION WITH INTRAOCULAR LENS IMPLANT RIGHT;  Surgeon: Jw Mansfield MD;  Location: [facility];  Service:    • MOUTH SURGERY      ORAL EXTRACTIONS   • OOPHORECTOMY Left    • TOTAL KNEE ARTHROPLASTY Right 02/09/2016   • TUBAL ABDOMINAL LIGATION         Family History   Problem Relation Age of Onset   • Diabetes Father        Social History     Social History   • Marital status:      Spouse name: N/A   • Number of children: N/A   • Years of education: N/A     Occupational History   • Not on file.     Social History Main Topics   • Smoking status: Current Every Day Smoker     Packs/day: 1.00     Years: 42.00     Types: Cigarettes   • Smokeless tobacco: Never Used   •  "Alcohol use No   • Drug use: No   • Sexual activity: Defer     Other Topics Concern   • Not on file     Social History Narrative        Allergies   Allergen Reactions   • Bee Venom        Review of Systems   Constitutional: Negative for fever.   HENT: Negative for voice change.    Eyes: Negative for visual disturbance.   Respiratory: Negative for shortness of breath.    Cardiovascular: Negative for chest pain.   Gastrointestinal: Negative for abdominal distention and abdominal pain.   Genitourinary: Negative for dysuria.   Musculoskeletal: Positive for arthralgias. Negative for gait problem and joint swelling.   Skin: Negative for rash.   Neurological: Negative for speech difficulty.   Hematological: Does not bruise/bleed easily.   Psychiatric/Behavioral: Negative for confusion.       PHYSICAL EXAMINATION:       Resp 18  Ht 60\" (152.4 cm)  Wt 128 lb (58.1 kg)  BMI 25 kg/m2    GENERAL [x] Well developed  []Ill appearing [x] No acute distress    HEENT [x]No acute changes [x] Normocephalic, atraumatic    NECK [x]Supple  [] No midline tenderness    LUNGS [x]Clear bilaterally [x]No wheezes []Rhonchi [] Rales    HEART [x] Regular rate  [x] Regular rhythm [] Irregular    ABDOMEN [x] Soft [x] Not tender [x] Not distended [x] Normal sounds    VAS/EXT [x] Normal Pulses []Edema []Cyanosis             SKIN [x] Warm [x]Dry []Pink []Ecchymosis []Cool    NEURO [x] Sensation Intact [x] Motor Intact [x] Pulse intact  [] Dysesthesias:_________  []Weakness:__________             Physical Exam   Constitutional: She is oriented to person, place, and time. She appears well-nourished.   Eyes: Conjunctivae are normal.   Neck: No tracheal deviation present.   Pulmonary/Chest: Effort normal. No respiratory distress.   Abdominal: Soft. She exhibits no distension. There is no tenderness.   Musculoskeletal:        Left knee: She exhibits no effusion.   Neurological: She is alert and oriented to person, place, and time.   Skin: No rash noted. "   Psychiatric: She has a normal mood and affect. Her behavior is normal.   Vitals reviewed.    Left Knee Exam     Tenderness   The patient is experiencing tenderness in the lateral joint line, medial joint line and medial retinaculum.    Range of Motion   Left knee extension: 5.   Flexion: 100     Tests   Patellar Apprehension: trace    Other   Erythema: absent  Sensation: normal  Pulse: present  Swelling: mild  Effusion: no effusion present          Extremity DVT signs are Negative on physical exam with negative Joshua sign, with no calf pain, with no palpable cords, with no increased pain with passive stretch/extension and with no skin tone change   Neurologic Exam     Mental Status   Oriented to person, place, and time.     Ortho Exam      DVT Screening: No Yes   Smoker [x] []   Personal/Family History of DVT or PE [x] []   Sedentary Lifestyle [x] []   Overweight [x] []       Previous or Active DVT/PE: NO  Cardiac Stent within 1 year: NO  Embolic/Ischemic stroke within 1 year: NO  Creatinine less than 30ml/min: NO  Congenital Thrombophilia: NO  Hypersensitivity to TXA: NO  Color Blindness: NO  NOTE:  TXA  tranexemic acid summary: THIS PATIENT IS A CANDIDATE FOR IV TXA DURING SURGERY.    Independent Review of Radiographic Studies:    Indication to evaluate joint condition, and compared with prior images, shows evident chronic advanced osteoarthritis.  Laboratory and Other Studies:  No new results reviewed today.     Risks, benefits, and alternative treatments discussed with the patient: [x] Yes [] No     Risk benefits and merits of the proposed surgery were discussed and the patient's questions were answered risks discussed including and not limited to:  Anesthesia reactions  Blood loss and possible transfusion  Infection  Deep venous thrombosis and pulmonary embolus  Nerve, vascular or tendon injury  Fracture  Deformity  Stiffness  Weakness  Prosthesis and implant issues such as loosening, material wear or  dislocation  Skin necrosis  Revision surgery or further treatment  Recurrence of problem and condition     Informed consent: [] Signed  [x] To be obtained at hospital  [] Both    Nicole was seen today for follow-up, pain and pre-op exam.    Diagnoses and all orders for this visit:    Primary osteoarthritis of left knee  -     Inpatient Admission; Standing  -     Case Request; Standing  -     Provide instructions to patient regarding NPO status  -     Obtain informed consent  -     Clorhexidine skin prep  -     CBC and Differential  -     Comprehensive metabolic panel  -     Protime-INR  -     APTT  -     MRSA screen  -     Urinalysis w/Culture if Indicated  -     ECG 12 Lead  -     XR chest 2 vw  -     Follow anesthesia standing orders.; Standing  -     Verify NPO Status; Standing  -     TOMMIE hose- To be placed on patient in pre-op; Standing  -     SCD (sequential compression device)- to be placed on patient in Pre-op; Standing  -     POC Glucose Fingerstick; Standing  -     Notify physician (specify); Standing  -     ceFAZolin (ANCEF) 2 g in sodium chloride 0.9 % 100 mL IVPB; Infuse 2 g into a venous catheter On Call to the Operating Room.  -     Tranexamic Acid 581 mg in sodium chloride 0.9 % 250 mL; Infuse 581 mg into a venous catheter On Call to the Operating Room.  -     Tranexamic Acid 581 mg in sodium chloride 0.9 % 250 mL; Infuse 581 mg into a venous catheter On Call to the Operating Room.  -     famotidine (PEPCID) injection 20 mg; Infuse 2 mL into a venous catheter On Call to the Operating Room.  -     Case Request    Pre-op testing  -     Inpatient Admission; Standing  -     Case Request; Standing  -     Provide instructions to patient regarding NPO status  -     Obtain informed consent  -     Clorhexidine skin prep  -     CBC and Differential  -     Comprehensive metabolic panel  -     Protime-INR  -     APTT  -     MRSA screen  -     Urinalysis w/Culture if Indicated  -     ECG 12 Lead  -     XR chest  2 vw  -     Follow anesthesia standing orders.; Standing  -     Verify NPO Status; Standing  -     TOMMIE hose- To be placed on patient in pre-op; Standing  -     SCD (sequential compression device)- to be placed on patient in Pre-op; Standing  -     POC Glucose Fingerstick; Standing  -     Notify physician (specify); Standing  -     ceFAZolin (ANCEF) 2 g in sodium chloride 0.9 % 100 mL IVPB; Infuse 2 g into a venous catheter On Call to the Operating Room.  -     Tranexamic Acid 581 mg in sodium chloride 0.9 % 250 mL; Infuse 581 mg into a venous catheter On Call to the Operating Room.  -     Tranexamic Acid 581 mg in sodium chloride 0.9 % 250 mL; Infuse 581 mg into a venous catheter On Call to the Operating Room.  -     famotidine (PEPCID) injection 20 mg; Infuse 2 mL into a venous catheter On Call to the Operating Room.  -     Case Request       Recommendations/Plan:    Orthopedic activities reviewed and patient expressed appreciation  Discussion of orthopedic goals  Risk, benefits, and merits of treatment alternatives reviewed with the patient and questions answered  The nature of the proposed surgery reviewed with the patient including risks, benefits, rehabilitation, recovery timeframe, and outcome expectations    Surgery: Surgery proposed at this visit as noted., If symptoms and functional limitations persist with current treatment, patient to consider elective surgery. and For intractable painful limiting condition, patient to consider elective surgical options.  Patient is encouraged to call or return for any issues or concerns.    PLANNED SURGICAL PROCEDURE: Elective Left total knee replacement            Electronically signed by Carlo Hutson PA-C at 9/25/2017 10:16 AM              Hospital Medications (active)       Dose Frequency Start End    acetaminophen (TYLENOL) tablet 650 mg 650 mg Once 10/10/2017 10/10/2017    Sig - Route: Take 2 tablets by mouth 1 (One) Time. - Oral    acetaminophen (TYLENOL)  tablet 650 mg 650 mg Every 12 Hours PRN 10/10/2017     Sig - Route: Take 2 tablets by mouth Every 12 (Twelve) Hours As Needed for Mild Pain . - Oral    atorvastatin (LIPITOR) tablet 10 mg 10 mg Nightly 10/10/2017     Sig - Route: Take 1 tablet by mouth Every Night. - Oral    bisacodyl (DULCOLAX) suppository 10 mg 10 mg Daily PRN 10/10/2017     Sig - Route: Insert 1 suppository into the rectum Daily As Needed for Constipation. - Rectal    ceFAZolin (ANCEF) 1 g injection  - ADS Override Pull   10/10/2017 10/11/2017    Notes to Pharmacy: Created by cabinet ambrosioide    ceFAZolin (ANCEF) 1 g/100 mL 0.9% NS (MBP) 1 g Every 8 Hours 10/10/2017 10/11/2017    Sig - Route: Infuse 100 mL into a venous catheter Every 8 (Eight) Hours. - Intravenous    ceFAZolin (ANCEF) 2-3 GM-% IVPB (duplex)  - ADS Override Pull   10/10/2017 10/11/2017    Notes to Pharmacy: Created by cabinet ambrosioide    ceFAZolin (ANCEF) IVPB (duplex) 2 g 2 g Once 10/10/2017 10/10/2017    Sig - Route: Infuse 2,000 mg into a venous catheter 1 (One) Time. - Intravenous    celecoxib (CeleBREX) capsule 200 mg 200 mg Once 10/10/2017 10/10/2017    Sig - Route: Take 2 capsules by mouth 1 (One) Time. - Oral    cetirizine (zyrTEC) tablet 10 mg 10 mg Daily 10/11/2017     Sig - Route: Take 1 tablet by mouth Daily. - Oral    cyanocobalamin injection 1,000 mcg 1,000 mcg Every 28 Days 10/31/2017     Sig - Route: Inject 1 mL into the shoulder, thigh, or buttocks Every 28 (Twenty-Eight) Days. - Intramuscular    docusate sodium (COLACE) capsule 100 mg 100 mg 2 Times Daily PRN 10/10/2017     Sig - Route: Take 1 capsule by mouth 2 (Two) Times a Day As Needed for Constipation. - Oral    famotidine (PEPCID) 10 MG/ML injection  - ADS Override Pull   10/10/2017 10/10/2017    Notes to Pharmacy: Created by cabinet laurel    fondaparinux (ARIXTRA) injection 2.5 mg 2.5 mg Every 24 Hours Scheduled 10/11/2017     Sig - Route: Inject 0.5 mL under the skin Daily. - Subcutaneous     "HYDROcodone-acetaminophen (NORCO) 7.5-325 MG per tablet 1 tablet 1 tablet Every 6 Hours PRN 10/10/2017 10/20/2017    Sig - Route: Take 1 tablet by mouth Every 6 (Six) Hours As Needed for Moderate Pain . - Oral    ipratropium-albuterol (DUO-NEB) nebulizer solution 3 mL 3 mL Once 10/10/2017 10/10/2017    Sig - Route: Take 3 mL by nebulization 1 (One) Time. - Nebulization    lactated ringers infusion 100 mL/hr Continuous 10/10/2017     Sig - Route: Infuse 100 mL/hr into a venous catheter Continuous. - Intravenous    levothyroxine (SYNTHROID, LEVOTHROID) tablet 75 mcg 75 mcg Every Early Morning 10/11/2017     Sig - Route: Take 1 tablet by mouth Every Morning. - Oral    meperidine (DEMEROL) 50 MG/ML injection  - ADS Override Pull   10/10/2017 10/11/2017    Notes to Pharmacy: Created by cabinet override    meperidine (DEMEROL) injection 50 mg 50 mg Once 10/10/2017 10/10/2017    Sig - Route: Infuse 1 mL into a venous catheter 1 (One) Time. - Intravenous    Morphine sulfate (PF) injection 4 mg 4 mg Every 2 Hours PRN 10/10/2017 10/20/2017    Sig - Route: Infuse 1 mL into a venous catheter Every 2 (Two) Hours As Needed for Moderate Pain . - Intravenous    Linked Group 1:  \"And\" Linked Group Details        naloxone (NARCAN) injection 0.4 mg 0.4 mg Every 5 Minutes PRN 10/10/2017     Sig - Route: Infuse 1 mL into a venous catheter Every 5 (Five) Minutes As Needed for Respiratory Depression. - Intravenous    Linked Group 1:  \"And\" Linked Group Details        ondansetron (ZOFRAN) injection 4 mg 4 mg Every 6 Hours PRN 10/10/2017     Sig - Route: Infuse 2 mL into a venous catheter Every 6 (Six) Hours As Needed for Nausea or Vomiting. - Intravenous    Linked Group 2:  \"Or\" Linked Group Details        ondansetron (ZOFRAN) tablet 4 mg 4 mg Every 6 Hours PRN 10/10/2017     Sig - Route: Take 1 tablet by mouth Every 6 (Six) Hours As Needed for Nausea or Vomiting. - Oral    Linked Group 2:  \"Or\" Linked Group Details        ondansetron ODT " "(ZOFRAN-ODT) disintegrating tablet 4 mg 4 mg Every 6 Hours PRN 10/10/2017     Sig - Route: Take 1 tablet by mouth Every 6 (Six) Hours As Needed for Nausea or Vomiting. - Oral    Linked Group 2:  \"Or\" Linked Group Details        oxyCODONE-acetaminophen (PERCOCET)  MG per tablet 1 tablet 1 tablet Once 10/10/2017 10/10/2017    Sig - Route: Take 1 tablet by mouth 1 (One) Time. - Oral    pantoprazole (PROTONIX) EC tablet 40 mg 40 mg Every Morning 10/11/2017     Sig - Route: Take 1 tablet by mouth Every Morning. - Oral    sodium chloride 0.9 % flush 1-10 mL 1-10 mL As Needed 10/10/2017     Sig - Route: Infuse 1-10 mL into a venous catheter As Needed for Line Care. - Intravenous    albuterol (PROVENTIL) nebulizer solution 0.083% 2.5 mg/3mL (Discontinued) 2.5 mg Once As Needed 10/10/2017 10/10/2017    Sig - Route: Take 2.5 mg by nebulization 1 (One) Time As Needed for Wheezing or Shortness of Air (bronchospasm). - Nebulization    Reason for Discontinue: Patient Transfer    ceFAZolin (ANCEF) IVPB (duplex) 2 g (Discontinued) 2 g On Call to O.R. 10/11/2017 10/10/2017    Sig - Route: Infuse 2,000 mg into a venous catheter On Call to the Operating Room. - Intravenous    FentaNYL Citrate (PF) (SUBLIMAZE) injection (Discontinued)  As Needed 10/10/2017 10/10/2017    Sig - Route: Infuse  into a venous catheter As Needed for Severe Pain . - Intravenous    Reason for Discontinue: Anesthesia Stop    HYDROmorphone (DILAUDID) injection 0.5 mg (Discontinued) 0.5 mg Every 15 Minutes PRN 10/10/2017 10/10/2017    Sig - Route: Infuse 0.5 mL into a venous catheter Every 15 (Fifteen) Minutes As Needed for Moderate Pain . - Intravenous    Reason for Discontinue: Patient Transfer    HYDROmorphone (DILAUDID) injection 0.5 mg (Discontinued) 0.5 mg Every 10 Minutes PRN 10/10/2017 10/10/2017    Sig - Route: Infuse 0.5 mL into a venous catheter Every 10 (Ten) Minutes As Needed for Severe Pain . - Intravenous    Reason for Discontinue: Patient " "Transfer    lactated ringers infusion 1,000 mL (Discontinued) 1,000 mL Continuous PRN 10/10/2017 10/10/2017    Sig - Route: Infuse 1,000 mL into a venous catheter Continuous As Needed (Start Prior to Surgery). - Intravenous    Reason for Discontinue: Patient Transfer    lidocaine (cardiac) (XYLOCAINE) injection (Discontinued)  As Needed 10/10/2017 10/10/2017    Sig - Route: Infuse  into a venous catheter As Needed. - Intravenous    Reason for Discontinue: Anesthesia Stop    meperidine (DEMEROL) injection 12.5 mg (Discontinued) 12.5 mg Every 5 Minutes PRN 10/10/2017 10/10/2017    Sig - Route: Infuse 0.5 mL into a venous catheter Every 5 (Five) Minutes As Needed for Shivering (May repeat x 1). - Intravenous    Reason for Discontinue: Patient Transfer    morphine injection 2 mg (Discontinued) 2 mg Every 10 Minutes PRN 10/10/2017 10/10/2017    Sig - Route: Infuse 1 mL into a venous catheter Every 10 (Ten) Minutes As Needed for Severe Pain . - Intravenous    Reason for Discontinue: Patient Transfer    ondansetron (ZOFRAN) injection 4 mg (Discontinued) 4 mg Once As Needed 10/10/2017 10/10/2017    Sig - Route: Infuse 2 mL into a venous catheter 1 (One) Time As Needed for Nausea or Vomiting. - Intravenous    Reason for Discontinue: Patient Transfer    ondansetron (ZOFRAN) injection 4 mg (Discontinued) 4 mg Once 10/10/2017 10/10/2017    Sig - Route: Infuse 2 mL into a venous catheter 1 (One) Time. - Intravenous    Reason for Discontinue: Patient Transfer    Phenylephrine HCl-NaCl (PF) syringe (Discontinued)  As Needed 10/10/2017 10/10/2017    Sig: As Needed.    Reason for Discontinue: Anesthesia Stop    promethazine (PHENERGAN) injection 6.25 mg (Discontinued) 6.25 mg Once As Needed 10/10/2017 10/10/2017    Sig - Route: Infuse 0.25 mL into a venous catheter 1 (One) Time As Needed for Nausea or Vomiting. - Intravenous    Reason for Discontinue: Patient Transfer    Linked Group 3:  \"Or\" Linked Group Details        promethazine " "(PHENERGAN) injection 6.25 mg (Discontinued) 6.25 mg Once As Needed 10/10/2017 10/10/2017    Sig - Route: Inject 0.25 mL into the shoulder, thigh, or buttocks 1 (One) Time As Needed for Nausea or Vomiting. - Intramuscular    Reason for Discontinue: Patient Transfer    Linked Group 3:  \"Or\" Linked Group Details        promethazine (PHENERGAN) suppository 25 mg (Discontinued) 25 mg Once As Needed 10/10/2017 10/10/2017    Sig - Route: Insert 1 suppository into the rectum 1 (One) Time As Needed for Nausea or Vomiting. - Rectal    Reason for Discontinue: Patient Transfer    Linked Group 3:  \"Or\" Linked Group Details        promethazine (PHENERGAN) tablet 25 mg (Discontinued) 25 mg Once As Needed 10/10/2017 10/10/2017    Sig - Route: Take 1 tablet by mouth 1 (One) Time As Needed for Nausea or Vomiting. - Oral    Reason for Discontinue: Patient Transfer    Linked Group 3:  \"Or\" Linked Group Details        Propofol (DIPRIVAN) injection (Discontinued)  As Needed 10/10/2017 10/10/2017    Sig - Route: Infuse  into a venous catheter As Needed. - Intravenous    Reason for Discontinue: Anesthesia Stop    rocuronium (ZEMURON) injection (Discontinued)  As Needed 10/10/2017 10/10/2017    Sig - Route: Infuse  into a venous catheter As Needed. - Intravenous    Reason for Discontinue: Anesthesia Stop    sodium chloride 0.9 % flush 3 mL (Discontinued) 3 mL As Needed 10/10/2017 10/10/2017    Sig - Route: Infuse 3 mL into a venous catheter As Needed for Line Care. - Intravenous    Reason for Discontinue: Patient Transfer    sodium chloride 1,000 mL with ceFAZolin 1 g irrigation (Discontinued)  As Needed 10/10/2017 10/10/2017    Sig: As Needed.    Reason for Discontinue: Patient Discharge    Tranexamic Acid 580 mg in sodium chloride 0.9 % 100 mL (Discontinued) 580 mg Once 10/10/2017 10/10/2017    Sig - Route: Infuse 580 mg into a venous catheter 1 (One) Time. - Intravenous    Reason for Discontinue: Anesthesia Stop    Linked Group 4:  " "\"Followed by\" Linked Group Details        Tranexamic Acid 580 mg in sodium chloride 0.9 % 100 mL (Discontinued) 580 mg Once 10/10/2017 10/10/2017    Sig - Route: Infuse 580 mg into a venous catheter 1 (One) Time. - Intravenous    Reason for Discontinue: Anesthesia Stop    Linked Group 4:  \"Followed by\" Linked Group Details                Lab Results (last 24 hours)     Procedure Component Value Units Date/Time    Needle Stick Pt Source [[id number]] Collected:  10/10/17 1755    Specimen:  Blood Updated:  10/10/17 1759    Narrative:       The following orders were created for panel order Needle Stick Pt Source.  Procedure                               Abnormality         Status                     ---------                               -----------         ------                     HCV RNA, PCR, Qualitative[[id number]]                        In process                 Hepatitis B Surface Antigen[[id number]]                      In process                 HIV-1 / O / 2 Ag / Antib...[[id number]]                      In process                   Please view results for these tests on the individual orders.    HIV-1 / O / 2 Ag / Antibody 4th Generation [[id number]] Collected:  10/10/17 1755    Specimen:  Blood Updated:  10/10/17 1759    HCV RNA, PCR, Qualitative [[id number]] Collected:  10/10/17 1755    Specimen:  Blood Updated:  10/10/17 1759    Hepatitis B Surface Antigen [[id number]] Collected:  10/10/17 1755    Specimen:  Blood Updated:  10/10/17 1759    CBC & Differential [[id number]] Collected:  10/11/17 0650    Specimen:  Blood Updated:  10/11/17 0719    Narrative:       The following orders were created for panel order CBC & Differential.  Procedure                               Abnormality         Status                     ---------                               -----------         ------                     CBC Auto Differential[[id number]]        Abnormal            Final result                 Please view results for " these tests on the individual orders.    CBC Auto Differential [[id number]]  (Abnormal) Collected:  10/11/17 0650    Specimen:  Blood Updated:  10/11/17 0719     WBC 9.79 10*3/mm3      RBC 3.89 (L) 10*6/mm3      Hemoglobin 11.5 (L) g/dL      Hematocrit 35.8 (L) %      MCV 92.0 fL      MCH 29.6 pg      MCHC 32.1 g/dL      RDW 14.4 %      RDW-SD 48.6 fl      MPV 11.0 fL      Platelets 280 10*3/mm3      Neutrophil % 68.4 %      Lymphocyte % 20.6 %      Monocyte % 10.5 %      Eosinophil % 0.0 %      Basophil % 0.3 %      Immature Grans % 0.2 %      Neutrophils, Absolute 6.69 10*3/mm3      Lymphocytes, Absolute 2.02 10*3/mm3      Monocytes, Absolute 1.03 (H) 10*3/mm3      Eosinophils, Absolute 0.00 10*3/mm3      Basophils, Absolute 0.03 10*3/mm3      Immature Grans, Absolute 0.02 10*3/mm3      nRBC 0.0 /100 WBC     Basic Metabolic Panel [[id number]]  (Abnormal) Collected:  10/11/17 0650    Specimen:  Blood Updated:  10/11/17 0726     Glucose 106 (H) mg/dL      BUN 7 mg/dL      Creatinine 0.50 (L) mg/dL      Sodium 144 mmol/L      Potassium 3.6 mmol/L      Chloride 109 (H) mmol/L      CO2 25.0 (L) mmol/L      Calcium 8.7 mg/dL      eGFR Non African Amer 125 mL/min/1.73      BUN/Creatinine Ratio 14.0     Anion Gap 13.6 mmol/L     Narrative:       Abnormal estimated GFR should be followed by more specific studies to confirm end stage chronic renal disease. The equation used for calculation may not be accurate for patients less than 19 years old, greater than 70 years old, patients at extremes of weight, malnutrition, or with acute renal dysfunction.    Tissue Pathology Exam - Bone, Knee, Left [[id number]] Collected:  10/10/17 1440    Specimen:  Bone from Knee, Left Updated:  10/11/17 0906           Operative/Procedure Notes (last 24 hours) (Notes from 10/10/2017 11:10 AM through 10/11/2017 11:10 AM)      Jerrell Britt MD at 10/10/2017  4:14 PM  Version 2 of 2         [facility] [address], [address]  [place]  [address]   [phone]      OPERATIVE REPORT      PATIENT NAME:  Nicole Mack                            YOB: 1955                     PREOP DIAGNOSIS: Left knee advanced degenerative joint disease    POSTOP DIAGNOSIS: Same.    PROCEDURE:  Left total knee replacement    SURGEON:   Jerrell Britt M.D.    FIRST ASSIST:  Circulator: Johann Regalado RN; Carrie Blank RN      Scrub Person: Chad Gilmore; Saúl Curiel; Patt Arango    ANESTHETIST:  CRNA: Oneal Villegas CRNA; Katrin Meredith CRNA    ANESTHESIA:   General    FLUIDS:   1000 ml crystalloid    ESTIM BLOOD LOSS: 75 ml    SPECIMENS:   Knee bone cuts sent to Pathology.    DRAINS:   None.    TOURNIQUET TIME:  52 min @ 275 mm Hg    HARDWARE: A [employer] cemented cruciate retaining total knee replacement with a size # 3 oxinium narrow femoral component, a size # 2 tibial component, 9 mm CR articulating dished polyethylene liner, and a 26 x 7.5 mm polyethylene patella    FINDINGS:                              End-stage DJD, osteophytes, erosions, cysts, bone-on-bone wear, three compartment involvement, significant valgus deformity, extensor lag and ligament imbalance.    COMPLICATIONS:  None    DISPOSITION:  Stable to recovery.     INDICATIONS AND NARRATIVE:  Patient present for elective knee arthroplasty to address painful intractable advanced degenerative joint disease with knee swelling, stiffness, deformity, instability and dysfunction.  The patient presents for planned elective total knee replacement.  Risks and benefits of the surgery were discussed and informed consent was obtained with the patient.  Risks discussed including but not limited to anesthesia, infection, nerve/vessel/tendon injury, fracture, prosthetic loosening or wear, blood loss and possible need for transfusion, DVT and pulmonary embolus.  Goals include pain relief, improved knee alignment, improved knee range of  motion and better function for ambulation and activities.      Antibiotic prophylaxis was given.  Tranexamic acid protocol followed.  Although not an absolute contraindication, caution is advised in patients with a history of prior stroke and so although the criteria for tranexamic acid (including no stroke within a year) could be met, it was elected not to give in this case.  Fortunately hemostasis throughout this procedure was excellent and blood loss considerably less than average in this case.  Surgeon site marking and a time out were performed prior to the procedure.  Anesthesia was effective and well tolerated.  The knee was prepped and draped in the usual sterile fashion.  A padded thigh tourniquet was placed for the procedure.    After sterile prep and drape, an anterior incision was made at the knee.  A medial para-patella arthrotomy was made and dissection continued proximally along the medial one-third of the quadriceps tendon and distally along the medial border of the patella tendon.  A partial inferior patella fat pad release was performed as well as a superior synovectomy.   Next, steps were taken to prepare the femoral, tibial and patella surfaces for the knee replacement implant.  Care was taken to incorporate 4 degrees of valgus and 3 degrees of external rotation in the femoral cut, neutral varus/valgus, neutral to slight external rotation and 3-5 degrees of posterior slope of the tibial cut, and the patella cut prepared to restore the original thickness with implants.  Equal flexion and extension gaps were achieved.  The minimally invasive instruments, sizing templates, cutting blocks, and guides were used together with radiographic x-ray templates.  The knee trial components achieved good alignment, fit, motion and stability. Soft tissue balancing was achieved in flexion and extension. The bone surfaces were thoroughly irrigated with antibiotic pulsed irrigation.  The entry to the femoral canal  was closed with a bone plug.      Next, using standard cement technique including vacuum mixing, centrifugation and pressurization, the actual prosthetic components as described above were cemented in place.  Again, a trial liner was placed to check for the best range of motion and stability of the knee.  The trial was removed and the actual polyethylene liner was placed onto the tibial tray and secured with the locking mechanism.  The knee was again taken through a final range of motion, alignment and stability check which was excellent.  There was also excellent patella tracking through the entire range of motion. The knee and leg showed good alignment.  The tourniquet was taken down and there was good hemostasis.  Minor venous bleeding was controlled with electrocautery.  The wound was irrigated copiously again as well as throughout the procedure and during closure with pulse lavage antibiotic irrigation.  Routine closure was performed consisting of interrupted #1 Vicryl sutures for the extensor mechanism, 2-0 Vicryl for deep and superficial subcutaneous layers and skin closed with running barbed absorbable subcuticular suture.  A sterile Dermabond and Covaderm dressing was applied.  Anesthesia was effective and well tolerated.  There were no complications with the procedure.  The patient was transferred stable to recovery.      Jerrell Britt MD  10/10/2017  4:14 PM     Electronically signed by Jerrell Britt MD at 10/10/2017  4:24 PM      Jerrell Britt MD at 10/10/2017  4:14 PM  Version 1 of 2         [address], [address]  [place]  [address]   [phone]      OPERATIVE REPORT      PATIENT NAME:  Nicole Mack                            YOB: 1955                     PREOP DIAGNOSIS: Left knee advanced degenerative joint disease    POSTOP DIAGNOSIS: Same.    PROCEDURE:  Left total knee replacement    SURGEON:   Jerrell Britt  M.D.    FIRST ASSIST:  Circulator: Johann Regalado RN; Carrie Blank RN      Scrub Person: Chad Gilmore; Saúl Curiel; Patt Arango    ANESTHETIST:  CRNA: Oneal Villegas CRNA; Katrin Meredith CRNA    ANESTHESIA:   General    FLUIDS:   1000 ml crystalloid    ESTIM BLOOD LOSS: 75 ml    SPECIMENS:   Knee bone cuts sent to Pathology.    DRAINS:   None.    TOURNIQUET TIME:  52 min @ 275 mm Hg    HARDWARE: A [employer] cemented cruciate retaining total knee replacement with a size # 3 oxinium narrow femoral component, a size # 2 tibial component, 9 mm CR articulating dished polyethylene liner, and a 26 x 7.5 mm polyethylene patella    FINDINGS:                              End-stage DJD, osteophytes, erosions, cysts, bone-on-bone wear, three compartment involvement, significant valgus deformity, extensor lag and ligament imbalance.    COMPLICATIONS:  None    DISPOSITION:  Stable to recovery.     INDICATIONS AND NARRATIVE:  Patient present for elective knee arthroplasty to address painful intractable advanced degenerative joint disease with knee swelling, stiffness, deformity, instability and dysfunction.  The patient presents for planned elective total knee replacement.  Risks and benefits of the surgery were discussed and informed consent was obtained with the patient.  Risks discussed including but not limited to anesthesia, infection, nerve/vessel/tendon injury, fracture, prosthetic loosening or wear, blood loss and possible need for transfusion, DVT and pulmonary embolus.  Goals include pain relief, improved knee alignment, improved knee range of motion and better function for ambulation and activities.      Antibiotic prophylaxis was given.  Tranexamic acid protocol followed.  Although not an absolute contraindication, caution is advised in patients with a history of prior stroke and so although the criteria for tranexamic acid (including no stroke within a year) could be met,  it was elected not to give in this case.  Fortunately hemostasis throughout this procedure was excellent and blood loss considerably less than average in this case.  Surgeon site marking and a time out were performed prior to the procedure.  Anesthesia was effective and well tolerated.  The knee was prepped and draped in the usual sterile fashion.  A padded thigh tourniquet was placed for the procedure.    After sterile prep and drape, an anterior incision was made at the knee.  A medial para-patella arthrotomy was made and dissection continued proximally along the medial one-third of the quadriceps tendon and distally along the medial border of the patella tendon.  A partial inferior patella fat pad release was performed as well as a superior synovectomy.   Next, steps were taken to prepare the femoral, tibial and patella surfaces for the knee replacement implant.  Care was taken to incorporate 4 degrees of valgus and 3 degrees of external rotation in the femoral cut, neutral varus/valgus, neutral to slight external rotation and 3-5 degrees of posterior slope of the tibial cut, and the patella cut prepared to restore the original thickness with implants.  Equal flexion and extension gaps were achieved.  The minimally invasive instruments, sizing templates, cutting blocks, and guides were used together with radiographic x-ray templates.  The knee trial components achieved good alignment, fit, motion and stability. Soft tissue balancing was achieved in flexion and extension. The bone surfaces were thoroughly irrigated with antibiotic pulsed irrigation.  The entry to the femoral canal was closed with a bone plug.      Next, using standard cement technique including vacuum mixing, centrifugation and pressurization, the actual prosthetic components as described above were cemented in place.  Again, a trial liner was placed to check for the best range of motion and stability of the knee.  The trial was removed and the  actual polyethylene liner was placed onto the tibial tray and secured with the locking mechanism.  The knee was again taken through a final range of motion, alignment and stability check which was excellent.  There was also excellent patella tracking through the entire range of motion. The knee and leg showed good alignment.  The tourniquet was taken down and there was good hemostasis.  Minor venous bleeding was controlled with electrocautery.  The wound was irrigated copiously again as well as throughout the procedure and during closure with pulse lavage antibiotic irrigation.  Routine closure was performed consisting of running barbed absorbable suture for the extensor mechanism, 2-0 Vicryl for deep and superficial subcutaneous layers and skin closed with running barbed absorbable subcuticular suture.  A sterile Dermabond and Covaderm dressing was applied.  Anesthesia was effective and well tolerated.  There were no complications with the procedure.  The patient was transferred stable to recovery.      Jerrell Britt MD  10/10/2017  4:14 PM     Electronically signed by Jerrell Britt MD at 10/10/2017  4:24 PM        Physician Progress Notes (last 24 hours) (Notes from 10/10/2017 11:10 AM through 10/11/2017 11:10 AM)     No notes of this type exist for this encounter.        Consult Notes (last 24 hours) (Notes from 10/10/2017 11:10 AM through 10/11/2017 11:10 AM)     No notes of this type exist for this encounter.

## 2017-10-11 NOTE — CONSULTS
[facility] HOSPITALIST CONSULT      Name:  Nicole Mack   Age:  61 y.o.  Sex:  female  :  1955  MRN:  [id number]   Visit Number:  [id number]  Admission Date:  10/10/2017  Date Of Service:  10/11/17  Primary Care Physician:  DANIEL Dela Cruz    Consulting Physician:    Dr. Britt    Reason For Consult:    Medical management of hypothyroidism and dementia    History Obtained From:    patient    Chief Complaint:     Left knee pain    History Of Presenting Illness:      Patient is a 61-year-old  female with history of hypothyroidism, GERD, dementia, CVA in  with no known deficits who comes in with osteoarthritis of her knee.  Dr. Britt check her for elective total knee arthroplasty yesterday.  Hospitalist team is consulted for medical management.  Patient denies any chest pressure, shortness breath, nausea, vomiting, but does complain of knee pain.  As far as her hypothyroidism she is taking her medications without problems.  She also has hyperlipidemia for which she was on Lipitor.  She has GERD for which she was on Prilosec.  We'll continue these medications.  She also is on aspirin daily for stroke prevention.  She reportedly has dementia and is a very poor historian.    Review Of Systems:     General ROS: negative  Psychological ROS: negative  Ophthalmic ROS: negative  ENT ROS: negative  Allergy and Immunology ROS: negative  Hematological and Lymphatic ROS: negative  Endocrine ROS: negative  Breast ROS: negative  Respiratory ROS: negative  Cardiovascular ROS: negative  Gastrointestinal ROS: negative  Genito-Urinary ROS: negative  Musculoskeletal ROS: positive for - pain in knee - left  Neurological ROS: negative  Dermatological ROS: negative       Past Medical History:    Hypothyroidism, GERD, dementia, CVA in     Past Surgical history:    Breast cyst excision, cataract surgery, mouth surgery, oophorectomy on the left, right total knee arthroplasty, bilateral  tubal ligation    Social History:    Current pack per day smoker, denies alcohol or drugs.  Lives with her .  Advised her to quit smoking.    Family History:    Father had diabetes type 2    Allergies:      Bee venom    Home Medications:    Prior to Admission Medications     Prescriptions Last Dose Informant Patient Reported? Taking?    acetaminophen (TYLENOL) 500 MG tablet Past Week Self Yes Yes    Take 1,000 mg by mouth Every 6 (Six) Hours As Needed for Mild Pain .    alendronate (FOSAMAX) 70 MG tablet Past Week Self Yes Yes    Take 70 mg by mouth Every 7 (Seven) Days.    atorvastatin (LIPITOR) 10 MG tablet 10/9/2017 Self Yes Yes    Take 10 mg by mouth Daily.    cetirizine (zyrTEC) 10 MG tablet 10/9/2017 Self Yes Yes    Take 10 mg by mouth Daily.    HYDROcodone-acetaminophen (NORCO) 5-325 MG per tablet Past Week  Yes Yes    Take 1 tablet by mouth As Needed.    levothyroxine (SYNTHROID, LEVOTHROID) 75 MCG tablet 10/10/2017 Self Yes Yes    Take 75 mcg by mouth Daily.    omeprazole (priLOSEC) 20 MG capsule 10/9/2017  Yes Yes    Take 20 mg by mouth Every Morning.    aspirin 325 MG EC tablet 10/3/2017 Self Yes No    Take 325 mg by mouth Daily.    cyanocobalamin 1000 MCG/ML injection 10/2/2017 Self Yes No    Inject 1,000 mcg into the shoulder, thigh, or buttocks Every 28 (Twenty-Eight) Days.             Hospital Scheduled Meds:      atorvastatin 10 mg Oral Nightly   cetirizine 10 mg Oral Daily   [START ON 10/31/2017] cyanocobalamin 1,000 mcg Intramuscular Q28 Days   fondaparinux 2.5 mg Subcutaneous Q24H   levothyroxine 75 mcg Oral Q AM   pantoprazole 40 mg Oral QAM         lactated ringers 100 mL/hr Last Rate: 100 mL/hr (10/11/17 5253)        Vital Signs:    Temp:  [97.5 °F (36.4 °C)-98.1 °F (36.7 °C)] 97.8 °F (36.6 °C)  Heart Rate:  [62-86] 77  Resp:  [16-20] 18  BP: ()/(54-79) 137/70    Last 3 weights    10/10/17  1809 10/11/17  1326   Weight: 133 lb 6.4 oz (60.5 kg) 135 lb (61.2 kg)       Body mass index  is 28.22 kg/(m^2).    Physical Exam:      General Appearance:    Alert, cooperative, in no acute distress   Head:    Normocephalic, without obvious abnormality, atraumatic   Eyes:            Lids and lashes normal, conjunctivae and sclerae normal, no   icterus, no pallor, corneas clear, PERRLA   Ears:    Ears appear intact with no abnormalities noted   Throat:   No oral lesions, no thrush, oral mucosa moist   Neck:   No adenopathy, supple, trachea midline, no thyromegaly, no     carotid bruit, no JVD   Back:     No kyphosis present, no scoliosis present, no skin lesions,       erythema or scars, no tenderness to percussion or                   palpation,   range of motion normal   Lungs:     Clear to auscultation,respirations regular, even and                   unlabored    Heart:    Regular rhythm and normal rate, normal S1 and S2, no            murmur, no gallop, no rub, no click   Breast Exam:    Deferred   Abdomen:     Normal bowel sounds, no masses, no organomegaly, soft        non-tender, non-distended, no guarding, no rebound                 tenderness   Genitalia:    Deferred   Extremities:   Moves all extremities well, no edema, no cyanosis, no              redness   Pulses:   Pulses palpable and equal bilaterally   Skin:   No bleeding, bruising or rash   Lymph nodes:   No palpable adenopathy   Neurologic:   Cranial nerves 2 - 12 grossly intact, sensation intact, DTR        present and equal bilaterally             Labs:    Results from last 7 days  Lab Units 10/11/17  0650   WBC 10*3/mm3 9.79   HEMOGLOBIN g/dL 11.5*   HEMATOCRIT % 35.8*   MCV fL 92.0   MCHC g/dL 32.1   PLATELETS 10*3/mm3 280           Results from last 7 days  Lab Units 10/11/17  0650   SODIUM mmol/L 144   POTASSIUM mmol/L 3.6   CHLORIDE mmol/L 109*   CO2 mmol/L 25.0*   BUN mg/dL 7   CREATININE mg/dL 0.50*   EGFR IF NONAFRICN AM mL/min/1.73 125   CALCIUM mg/dL 8.7   GLUCOSE mg/dL 106*   Estimated Creatinine Clearance: 96.6 mL/min (by C-G  formula based on Cr of 0.5).  No results found for: AMMONIA          No results found for: HGBA1C  No results found for: TSH, FREET4  No results found for: PREGTESTUR, PREGSERUM, HCG, HCGQUANT  Pain Management Panel     There is no flowsheet data to display.                          Radiology:    Imaging Results (last 7 days)     Procedure Component Value Units Date/Time    XR Knee 1 or 2 View Left [[id number]] Collected:  10/10/17 1644     Updated:  10/10/17 1647    Narrative:       PROCEDURE: XR KNEE 1 OR 2 VW LEFT-     HISTORY: Post-op L TKR; M17.12-Unilateral primary osteoarthritis, left  knee; Z01.818-Encounter for other preprocedural examination     COMPARISON: None.     FINDINGS:  A 2 view exam demonstrates total left knee arthroplasty.   There are no hardware complications.  The expected postoperative fluid  and gas is seen in the joint.           Impression:       Status post total left knee arthroplasty with no hardware  complications.                 This report was finalized on 10/10/2017 4:44 PM by Dayan Schaffer M.D..          Assessment:    1.  Status post left total knee arthroplasty for osteoarthritis  2.  History of CVA with no known deficits  3.  Dementia mild  4.  GERD  5.  Hypothyroidism    Recommendations/Plan:     Continue with home medications.  DVT prophylaxis.  Monitor lab work.  Hospitalist team will follow along with orthopedic team.    Russel Sánchez DO  10/11/17  10:48 AM

## 2017-10-11 NOTE — SIGNIFICANT NOTE
10/11/17 1600   Rehab Treatment   Discipline physical therapy assistant   Treatment Not Performed patient/family declined treatment  (Pt reports being too fatigued to participate with pm treatment. Therapy will f/u with pt tomorrow.  )

## 2017-10-11 NOTE — THERAPY TREATMENT NOTE
Acute Care - Physical Therapy Treatment Note  [facility]     Patient Name: Nicole Mack  : 1955  MRN: [id number]  Today's Date: 10/11/2017  Onset of Illness/Injury or Date of Surgery Date: 10/10/17  Date of Referral to PT: 10/10/17  Referring Physician: Dr. Britt    Admit Date: 10/10/2017    Visit Dx:    ICD-10-CM ICD-9-CM   1. Impaired functional mobility, balance, gait, and endurance Z74.09 V49.89   2. Primary osteoarthritis of left knee M17.12 715.16   3. Pre-op testing Z01.818 V72.84   4. Impaired mobility and ADLs Z74.09 799.89     Patient Active Problem List   Diagnosis   • Concentration deficit   • Microangiopathy   • Migraine without aura and without status migrainosus, not intractable   • B12 deficiency   • Pre-op testing   • Primary osteoarthritis of left knee   • OA (osteoarthritis) of knee               Adult Rehabilitation Note       10/11/17 1032          Rehab Assessment/Intervention    Discipline physical therapy assistant  -RM      Document Type therapy note (daily note)  -RM      Subjective Information agree to therapy;complains of;weakness;pain  -RM      Patient Effort, Rehab Treatment adequate  -RM      Symptoms Noted During/After Treatment increased pain  -RM      Precautions/Limitations fall precautions  -RM      Specific Treatment Considerations 2 lpm pnc   -RM      Recorded by [RM] Freddie Vargas PTA      Vital Signs    Pre SpO2 (%) 92  -RM      O2 Delivery Pre Treatment supplemental O2  -RM      Intra SpO2 (%) 94  -RM      O2 Delivery Intra Treatment supplemental O2  -RM      Post SpO2 (%) 95  -RM      O2 Delivery Post Treatment supplemental O2  -RM      Pre Patient Position Supine  -RM      Intra Patient Position Standing  -RM      Post Patient Position Sitting  -RM      Recorded by [RM] Freddie Vargas PTA      Pain Assessment    Pain Assessment 0-10  -RM      Pain Score 7  -RM      Post Pain Score 8  -RM      Pain Type Acute pain;Surgical pain  -RM      Pain Location  Knee  -RM      Pain Orientation Left  -RM      Pain Descriptors Aching;Discomfort  -RM      Pain Frequency Constant/continuous  -RM      Pain Onset Ongoing  -RM      Pain Intervention(s) Repositioned;Ambulation/increased activity;Cold pack  -RM      Response to Interventions tolerated,informed nurse   -RM      Recorded by [RM] Freddie Vargas PTA      Bed Mobility, Assessment/Treatment    Bed Mobility, Assistive Device bed rails;head of bed elevated  -RM      Bed Mob, Supine to Sit, [place] verbal cues required;minimum assist (75% patient effort)  -RM      Bed Mobility, Safety Issues decreased use of arms for pushing/pulling;decreased use of legs for bridging/pushing  -RM      Bed Mobility, Impairments ROM decreased;strength decreased;pain  -RM      Recorded by [RM] Freddie Vargas PTA      Transfer Assessment/Treatment    Transfers, Sit-Stand [place] verbal cues required;contact guard assist  -RM      Transfers, Stand-Sit [place] verbal cues required;contact guard assist  -RM      Transfers, Sit-Stand-Sit, Assist Device rolling walker  -RM      Transfer, Safety Issues weight-shifting ability decreased;sequencing ability decreased;steps too close front assistive device  -RM      Recorded by [RM] Freddie Vargas PTA      Gait Assessment/Treatment    Gait, [place] Level verbal cues required;contact guard assist  -RM      Gait, Assistive Device rolling walker  -RM      Gait, Distance (Feet) 88  -RM      Gait, Gait Pattern Analysis swing-through gait  -RM      Gait, Gait Deviations antalgic;left:;decreased heel strike;step length decreased;toe-to-floor clearance decreased;weight-shifting ability decreased  -RM      Recorded by [RM] Freddie Vargas PTA      Therapy Exercises    Left Lower Extremity AAROM:;10 reps;supine;ankle pumps/circles;glut sets;heel slides;hip abduction/adduction;quad sets;SAQ;SLR  -RM      Recorded by [RM] Freddie Vargas PTA      Positioning and Restraints     Pre-Treatment Position in bed  -RM      Post Treatment Position chair  -RM      In Chair reclined;call light within reach;encouraged to call for assist;notified nsg;legs elevated  -RM      Recorded by [] Freddie Vargas PTA        User Key  (r) = Recorded By, (t) = Taken By, (c) = Cosigned By    Initials Name Effective Dates    RM Freddie Vargas PTA 10/26/16 -                 IP PT Goals       10/11/17 1221 10/10/17 2022       Bed Mobility PT LTG    Bed Mobility PT LTG, Date Established  10/10/17  -JR     Bed Mobility PT LTG, Time to Achieve  2 wks  -JR     Bed Mobility PT LTG, Activity Type  all bed mobility  -JR     Bed Mobility PT LTG, [place] Level  contact guard assist  -JR     Bed Mobility PT LTG, Outcome goal ongoing  -RM      Transfer Training PT LTG    Transfer Training PT LTG, Date Established  10/10/17  -JR     Transfer Training PT LTG, Time to Achieve  2 wks  -JR     Transfer Training PT LTG, Activity Type  sit to stand/stand to sit;bed to chair /chair to bed  -JR     Transfer Training PT LTG, [place] Level  contact guard assist  -JR     Transfer Training PT LTG, Assist Device  walker, rolling  -JR     Transfer Training PT LTG, Outcome goal ongoing  -RM      Gait Training PT LTG    Gait Training Goal PT LTG, Date Established  10/10/17  -JR     Gait Training Goal PT LTG, Time to Achieve  2 wks  -JR     Gait Training Goal PT LTG, [place] Level  contact guard assist  -JR     Gait Training Goal PT LTG, Assist Device  walker, rolling  -JR     Gait Training Goal PT LTG, Distance to Achieve  250  -JR     Gait Training Goal PT LTG, Outcome goal ongoing  -RM        User Key  (r) = Recorded By, (t) = Taken By, (c) = Cosigned By    Initials Name Provider Type    JR Gale Jones, PT Physical Therapist     Freddie Vargas PTA Physical Therapy Assistant          Physical Therapy Education     Title: PT OT SLP Therapies (Active)     Topic: Physical Therapy (Active)     Point: Mobility training  (Done)    Learning Progress Summary    Learner Readiness Method Response Comment Documented by Status   Patient Acceptance EJUSTIN,NR   10/11/17 1219 Done    Acceptance E VU Importance of mobility to recovery  10/10/17 2020 Done               Point: Home exercise program (Done)    Learning Progress Summary    Learner Readiness Method Response Comment Documented by Status   Patient Acceptance JUSTIN GIPSON,NR   10/11/17 1219 Done                      User Key     Initials Effective Dates Name Provider Type Discipline     10/26/16 -  Gale Jones, PT Physical Therapist PT     10/26/16 -  Freddie Vargas PTA Physical Therapy Assistant PT                    PT Recommendation and Plan  Anticipated Discharge Disposition: home with home health  Planned Therapy Interventions: balance training, ROM (Range of Motion), strengthening, bed mobility training, transfer training, gait training, patient/family education  PT Frequency: 2 times/day  Plan of Care Review  Plan Of Care Reviewed With: patient  Progress: improving  Outcome Summary/Follow up Plan: Pt presented with improved mobility tolerance this date evident by increased ambulation distance and gait pattern as well as tolerance of ther ex.  See flow sheet for details.            Outcome Measures       10/11/17 1032 10/11/17 1024 10/10/17 1917    How much help from another person do you currently need...    Turning from your back to your side while in flat bed without using bedrails? 3  -RM  3  -JR    Moving from lying on back to sitting on the side of a flat bed without bedrails? 3  -RM  3  -JR    Moving to and from a bed to a chair (including a wheelchair)? 3  -RM  3  -JR    Standing up from a chair using your arms (e.g., wheelchair, bedside chair)? 3  -RM  3  -JR    Climbing 3-5 steps with a railing? 2  -RM  2  -JR    To walk in hospital room? 3  -RM  3  -JR    AM-PAC 6 Clicks Score 17  -RM  17  -JR    How much help from another is currently needed...    Putting on  and taking off regular lower body clothing?  2  -SD     Bathing (including washing, rinsing, and drying)  2  -SD     Toileting (which includes using toilet bed pan or urinal)  3  -SD     Putting on and taking off regular upper body clothing  4  -SD     Taking care of personal grooming (such as brushing teeth)  4  -SD     Eating meals  4  -SD     Score  19  -SD     Functional Assessment    Outcome Measure Options AM-PAC 6 Clicks Basic Mobility (PT)  -RM AM-PAC 6 Clicks Daily Activity (OT)  -SD AM-PAC 6 Clicks Basic Mobility (PT)  -JR      User Key  (r) = Recorded By, (t) = Taken By, (c) = Cosigned By    Initials Name Provider Type    JR Gale Jones, PT Physical Therapist    DEONDRE Vargas PTA Physical Therapy Assistant    ELADIO Miller, OT Occupational Therapist           Time Calculation:         PT Charges       10/11/17 1223          Time Calculation    Start Time 1032  -RM      PT Received On 10/11/17  -RM      PT Goal Re-Cert Due Date 10/20/17  -RM      Time Calculation- PT    Total Timed Code Minutes- PT 44 minute(s)  -RM        User Key  (r) = Recorded By, (t) = Taken By, (c) = Cosigned By    Initials Name Provider Type     Freddie Vargas PTA Physical Therapy Assistant          Therapy Charges for Today     Code Description Service Date Service Provider Modifiers Qty    19571666475 HC GAIT TRAINING EA 15 MIN 10/11/2017 Freddie Vargas PTA GP 1    78177333573 HC PT THERAPEUTIC ACT EA 15 MIN 10/11/2017 Freddie Vargas PTA GP 1    68847536386 HC PT THER PROC EA 15 MIN 10/11/2017 Freddie Vargas PTA GP 1          PT G-Codes  Outcome Measure Options: AM-PAC 6 Clicks Basic Mobility (PT)    Freddie Vargas PTA  10/11/2017

## 2017-10-11 NOTE — PROGRESS NOTES
"      [facility]  PROGRESS NOTE    Name:  Nicole Mack   Age:  61 y.o.  Sex:  female  :  1955  MRN:  [id number]   Visit Number:  [id number]  Admission Date:  Date Of Service:  10/11/17  Primary Care Physician:  DANIEL Dela Cruz     LOS: 1 day :  Patient Care Team:  DANIEL Dela Cruz as PCP - General (Family Medicine):    Chief Complaint:      \" My left knee pain isn't bad\"    Subjective / Interval History:     Patient is status post day #1 elective total left knee replacement.  Patient denies chest pain or shortness of breath.  She denies dizziness or lightheadedness.  She states she is doing great with physical therapy.    Review of Systems:     General ROS: No fever spike, no chills or loss of consciousness.  Ophthalmic ROS: no acute visual disturbance.  ENT ROS: No sinus congestion or sore throat.   Respiratory ROS: No shortness of breath.   Cardiovascular ROS: No chest pain or palpitations.  Gastrointestinal ROS: No acute abdominal change. Non-distended.  Genito-Urinary ROS: No reported dysuria or hematuria.  Musculoskeletal ROS: No deep calf pain. No acute focal motor deficit  Neurological ROS: No cyanosis, no new numbness or tingling.  Dermatological ROS: No erythema.  No rash or pruritis.    Vital Signs:    Temp:  [97.5 °F (36.4 °C)-98.1 °F (36.7 °C)] 97.8 °F (36.6 °C)  Heart Rate:  [62-86] 77  Resp:  [16-20] 18  BP: ()/(54-79) 137/70    Intake and output:    I/O last 3 completed shifts:  In: 1020 [I.V.:1020]  Out: 875 [Urine:800; Blood:75]       Physical Examination:    General Appearance:    Alert and cooperative, no acute distress.   Head:    Atraumatic and normocephalic, without obvious abnormality.   Eyes:    PERRLA.  Extraocular movements are within normal limits.   ENT:   No acute change.   Neck:   Supple, trachea midline.   Lungs:     Normal respirations, unlabored.    Heart:    Regular rate.   Abdomen:     Soft non-tender, non-distended.   Extremities:   No new " motor deficit, no calf pain, Joshua sign negative.   Skin:     No rash.  No cyanosis.  No erythema.  No active drainage.  With contact precautions, sterile dressing change done.   Neurologic:   Sensation intact, pulses intact.     Laboratory results:    Lab Results (last 24 hours)     Procedure Component Value Units Date/Time    Needle Stick Pt Source [[id number]] Collected:  10/10/17 1755    Specimen:  Blood Updated:  10/10/17 1759    Narrative:       The following orders were created for panel order Needle Stick Pt Source.  Procedure                               Abnormality         Status                     ---------                               -----------         ------                     HCV RNA, PCR, Qualitative[[id number]]                        In process                 Hepatitis B Surface Antigen[[id number]]                      In process                 HIV-1 / O / 2 Ag / Antib...[[id number]]                      In process                   Please view results for these tests on the individual orders.    HIV-1 / O / 2 Ag / Antibody 4th Generation [[id number]] Collected:  10/10/17 1755    Specimen:  Blood Updated:  10/10/17 1759    HCV RNA, PCR, Qualitative [[id number]] Collected:  10/10/17 1755    Specimen:  Blood Updated:  10/10/17 1759    Hepatitis B Surface Antigen [[id number]] Collected:  10/10/17 1755    Specimen:  Blood Updated:  10/10/17 1759    CBC & Differential [[id number]] Collected:  10/11/17 0650    Specimen:  Blood Updated:  10/11/17 0719    Narrative:       The following orders were created for panel order CBC & Differential.  Procedure                               Abnormality         Status                     ---------                               -----------         ------                     CBC Auto Differential[[id number]]        Abnormal            Final result                 Please view results for these tests on the individual orders.    CBC Auto Differential [[id number]]  (Abnormal)  Collected:  10/11/17 0650    Specimen:  Blood Updated:  10/11/17 0719     WBC 9.79 10*3/mm3      RBC 3.89 (L) 10*6/mm3      Hemoglobin 11.5 (L) g/dL      Hematocrit 35.8 (L) %      MCV 92.0 fL      MCH 29.6 pg      MCHC 32.1 g/dL      RDW 14.4 %      RDW-SD 48.6 fl      MPV 11.0 fL      Platelets 280 10*3/mm3      Neutrophil % 68.4 %      Lymphocyte % 20.6 %      Monocyte % 10.5 %      Eosinophil % 0.0 %      Basophil % 0.3 %      Immature Grans % 0.2 %      Neutrophils, Absolute 6.69 10*3/mm3      Lymphocytes, Absolute 2.02 10*3/mm3      Monocytes, Absolute 1.03 (H) 10*3/mm3      Eosinophils, Absolute 0.00 10*3/mm3      Basophils, Absolute 0.03 10*3/mm3      Immature Grans, Absolute 0.02 10*3/mm3      nRBC 0.0 /100 WBC     Basic Metabolic Panel [[id number]]  (Abnormal) Collected:  10/11/17 0650    Specimen:  Blood Updated:  10/11/17 0726     Glucose 106 (H) mg/dL      BUN 7 mg/dL      Creatinine 0.50 (L) mg/dL      Sodium 144 mmol/L      Potassium 3.6 mmol/L      Chloride 109 (H) mmol/L      CO2 25.0 (L) mmol/L      Calcium 8.7 mg/dL      eGFR Non African Amer 125 mL/min/1.73      BUN/Creatinine Ratio 14.0     Anion Gap 13.6 mmol/L     Narrative:       Abnormal estimated GFR should be followed by more specific studies to confirm end stage chronic renal disease. The equation used for calculation may not be accurate for patients less than 19 years old, greater than 70 years old, patients at extremes of weight, malnutrition, or with acute renal dysfunction.    Tissue Pathology Exam - Bone, Knee, Left [[id number]] Collected:  10/10/17 1440    Specimen:  Bone from Knee, Left Updated:  10/11/17 0906          I have reviewed the patient's laboratory results.    Radiology results:    Imaging Results (last 24 hours)     Procedure Component Value Units Date/Time    XR Knee 1 or 2 View Left [[id number]] Collected:  10/10/17 1644     Updated:  10/10/17 1647    Narrative:       PROCEDURE: XR KNEE 1 OR 2 VW LEFT-     HISTORY:  Post-op L TKR; M17.12-Unilateral primary osteoarthritis, left  knee; Z01.818-Encounter for other preprocedural examination     COMPARISON: None.     FINDINGS:  A 2 view exam demonstrates total left knee arthroplasty.   There are no hardware complications.  The expected postoperative fluid  and gas is seen in the joint.           Impression:       Status post total left knee arthroplasty with no hardware  complications.                 This report was finalized on 10/10/2017 4:44 PM by Dayan Schaffer M.D..          I have reviewed the patient's radiology reports.    AP and lateral taken in recovery room shows no acute concern.  Good position and alignment of prosthesis and/or hardware.       Assessment/Plan    Active Problems:    Pre-op testing    Primary osteoarthritis of left knee    OA (osteoarthritis) of knee      S/P Left total knee arthroplasty    Continue current management per the detailed orders and instructions, including skin, safety and fall precautions, respiratory therapy with incentive spirometer, advance diet as tolerated, bowel regimen, multi-modal analgesia, multi-modal DVT prophylaxis, Hospitalist consult, PT/OT following post-surgical rehab protocol, and Case Management for discharge plans.    Patient would like to be discharged home with home health tomorrow.  She states she has all necessary durable medical equipment at home.  Carlo Hutson PA-C  10/11/17  12:21 PM

## 2017-10-11 NOTE — THERAPY EVALUATION
Acute Care - Occupational Therapy Initial Evaluation   [place]     Patient Name: Nicole Mack  : 1955  MRN: [id number]  Today's Date: 10/11/2017  Onset of Illness/Injury or Date of Surgery Date: 10/10/17  Date of Referral to OT: 10/10/17  Referring Physician: Dr. Britt    Admit Date: 10/10/2017       ICD-10-CM ICD-9-CM   1. Impaired functional mobility, balance, gait, and endurance Z74.09 V49.89   2. Primary osteoarthritis of left knee M17.12 715.16   3. Pre-op testing Z01.818 V72.84   4. Impaired mobility and ADLs Z74.09 799.89     Patient Active Problem List   Diagnosis   • Concentration deficit   • Microangiopathy   • Migraine without aura and without status migrainosus, not intractable   • B12 deficiency   • Pre-op testing   • Primary osteoarthritis of left knee   • OA (osteoarthritis) of knee     Past Medical History:   Diagnosis Date   • Arthritis of back    • Disease of thyroid gland    • Fracture     LEFT PINKY TOE AT PRESENT TIME, RIGHT ANKLE WHEN A TEENAGER   • GERD (gastroesophageal reflux disease)    • H/O seasonal allergies    • High cholesterol    • [place] (hard of hearing)     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 REPORTS WEAKNESS IN EXTREMITIES FROM THIS.    • Valgus deformity, not elsewhere classified, right knee    • Wears glasses    • Wears partial dentures     UPPER PLATE     Past Surgical History:   Procedure Laterality Date   • BREAST CYST EXCISION Left     REPORTS EARLINE, REPORTS NO RESTRICTIONS ON LUE   • CATARACT EXTRACTION W/ INTRAOCULAR LENS IMPLANT Left 2017    Procedure: CATARACT PHACO EXTRACTION WITH INTRAOCULAR LENS IMPLANT LEFT;  Surgeon: Jw Mansfield MD;  Location: [facility];  Service:    • CATARACT EXTRACTION W/ INTRAOCULAR LENS IMPLANT Right 2017    Procedure: CATARACT PHACO EXTRACTION WITH  INTRAOCULAR LENS IMPLANT RIGHT;  Surgeon: Jw Mansfield MD;  Location: [facility] OR;  Service:    • JOINT REPLACEMENT Right     KNEE REPLACEMENT   • MOUTH SURGERY      ORAL EXTRACTIONS   • OOPHORECTOMY Left    • TOTAL KNEE ARTHROPLASTY Right 02/09/2016   • TUBAL ABDOMINAL LIGATION            OT ASSESSMENT FLOWSHEET (last 72 hours)      OT Evaluation       10/11/17 1032 10/11/17 1024 10/10/17 1916 10/10/17 1810 10/10/17 1235    Rehab Evaluation    Document Type (P)  therapy note (daily note)  -RM evaluation  -SD evaluation  -JR      Subjective Information (P)  agree to therapy;complains of;weakness;pain  -RM agree to therapy;complains of;pain  -SD agree to therapy;complains of;pain;dizziness  -JR      Patient Effort, Rehab Treatment (P)  adequate  -RM good  -SD good  -JR      Symptoms Noted During/After Treatment (P)  increased pain  -RM increased pain  -SD dizziness  -JR      General Information    Patient Profile Review  yes  -SD yes  -JR      Onset of Illness/Injury or Date of Surgery Date  10/10/17  -SD 10/10/17  -JR      Referring Physician  Dr. Britt  -SD Jerrell Britt MD  -JR      General Observations  Patient received supine in bed on 2L O2 via NC, IV intact.    -SD Supine with oxygen at 2 LPM per nasal cannula, IV in RUE, dressing intact on left knee  -JR      Pertinent History Of Current Problem  OA left knee, s/p TKA; CVA  -SD OA in left knee s/p TKA  -JR      Precautions/Limitations (P)  fall precautions  -RM fall precautions  -SD fall precautions  -JR      Prior Level of Function  independent:;all household mobility;community mobility;ADL's  -SD independent:;community mobility  -JR      Equipment Currently Used at Home  commode;walker, rolling  -SD none  -JR  none  -KR    Plans/Goals Discussed With  patient and family;agreed upon  -SD patient and family;agreed upon  -JR      Risks Reviewed  patient and family:;dizziness;increased discomfort  -SD patient and family:;increased discomfort;dizziness  -JR       Benefits Reviewed  patient and family:;increase strength;increase balance;decrease pain  -SD patient and family:;increase balance;increase strength;increase independence;improve function  -JR      Barriers to Rehab  none identified  -SD none identified  -JR      Living Environment    Lives With  child(james), adult;spouse  -SD spouse  -JR  spouse;child(james), adult;other (see comments)   grandchildren  -KR    Living Arrangements  house  -SD house  -JR  house  -KR    Home Accessibility  bed and bath on same level;stairs to enter home  -SD bed and bath on same level;stairs to enter home  -JR  no concerns;bed and bath on same level  -KR    Number of Stairs to Enter Home  4  -SD 2  -JR      Stair Railings at Home  outside, present at both sides  -SD   none  -KR    Type of Financial/Environmental Concern  none  -SD   none  -KR    Transportation Available  family or friend will provide  -SD   car;family or friend will provide  -KR    Clinical Impression    Date of Referral to OT  10/10/17  -SD       OT Diagnosis  ADL decline  -SD       Impairments Found (describe specific impairments)  aerobic capacity/endurance;gait, locomotion, and balance  -SD       Criteria for Skilled Therapeutic Interventions Met  yes  -SD       Rehab Potential  good, to achieve stated therapy goals  -SD       Therapy Frequency  3-5 times/wk  -SD       Anticipated Discharge Disposition  home with home health  -SD       Functional Level Prior    Ambulation  0-->independent  -SD  0-->independent  -CC 0-->independent  -KR    Transferring  0-->independent  -SD  0-->independent  -CC 0-->independent  -KR    Toileting  0-->independent  -SD  0-->independent  -CC 0-->independent  -KR    Bathing  0-->independent  -SD  0-->independent  -CC 0-->independent  -KR    Dressing  0-->independent  -SD  0-->independent  -CC 0-->independent  -KR    Eating  0-->independent  -SD  0-->independent  -CC 0-->independent  -KR    Communication  0-->understands/communicates without  difficulty  -SD  0-->understands/communicates without difficulty  -CC 0-->understands/communicates without difficulty  -KR    Swallowing  0-->swallows foods/liquids without difficulty  -SD  0-->swallows foods/liquids without difficulty  -CC 0-->swallows foods/liquids without difficulty  -KR    Prior Functional Level Comment    none  -CC     Vital Signs    Pre SpO2 (%) (P)  92  -RM        O2 Delivery Pre Treatment (P)  supplemental O2  -RM        Intra SpO2 (%) (P)  94  -RM        O2 Delivery Intra Treatment (P)  supplemental O2  -RM        Post SpO2 (%) (P)  95  -RM        O2 Delivery Post Treatment (P)  supplemental O2  -RM        Pre Patient Position (P)  Supine  -RM        Intra Patient Position (P)  Standing  -RM        Post Patient Position (P)  Sitting  -RM        Pain Assessment    Pain Assessment (P)  0-10  -RM 0-10  -SD 0-10  -JR      Pain Score  7  -SD 6  -JR      Post Pain Score  8  -SD 6  -JR      Pain Type  Acute pain;Surgical pain  -SD Surgical pain;Acute pain  -JR      Pain Location  Knee  -SD Knee  -JR      Pain Orientation  Left  -SD Left  -JR      Pain Intervention(s)  Repositioned;Ambulation/increased activity  -SD Repositioned;Ambulation/increased activity;Medication (See MAR)  -JR      Response to Interventions  Tolerated; Nsg notified and was going to give med  -SD no increase in pain with PT  -JR      Vision Assessment/Intervention    Visual Impairment  WFL with corrective lenses  -SD       Cognitive Assessment/Intervention    Current Cognitive/Communication Assessment  functional  -SD functional  -JR      Orientation Status  oriented x 4  -SD oriented x 4  -JR      Follows Commands/Answers Questions  able to follow multi-step instructions  -SD able to follow multi-step instructions  -JR      Personal Safety  decreased awareness, need for assist;decreased awareness, need for safety  -SD decreased awareness, need for assist;decreased awareness, need for safety  -JR      Personal Safety  Interventions  fall prevention program maintained;gait belt  -SD gait belt;fall prevention program maintained;nonskid shoes/slippers when out of bed  -JR      ROM (Range of Motion)    General ROM  no range of motion deficits identified  -SD       General ROM Detail   WFL except L knee 8-65  -JR      MMT (Manual Muscle Testing)    General MMT Assessment  upper extremity strength deficits identified  -SD       General MMT Assessment Detail  4-/5  -SD LLE= 2/5 knee  -JR      Mobility Assessment/Training    Extremity Weight-Bearing Status   left lower extremity  -JR      Left Lower Extremity Weight-Bearing   weight-bearing as tolerated  -JR      Bed Mobility, Assessment/Treatment    Bed Mobility, Assistive Device  bed rails;head of bed elevated  -SD head of bed elevated;bed rails  -JR      Bed Mob, Supine to Sit, [place]  minimum assist (75% patient effort)  -SD minimum assist (75% patient effort)  -JR      Bed Mob, Sit to Supine, [place]   minimum assist (75% patient effort)  -JR      Bed Mobility, Safety Issues  decreased use of arms for pushing/pulling;decreased use of legs for bridging/pushing  -SD decreased use of legs for bridging/pushing  -JR      Bed Mobility, Impairments  ROM decreased;strength decreased;impaired balance  -SD ROM decreased;strength decreased;coordination impaired;postural control impaired;pain  -JR      Transfer Assessment/Treatment    Transfers, Sit-Stand [place]  contact guard assist  -SD minimum assist (75% patient effort)  -JR      Transfers, Stand-Sit [place]  contact guard assist  -SD minimum assist (75% patient effort)  -JR      Transfers, Sit-Stand-Sit, Assist Device  rolling walker  -SD rolling walker  -JR      Toilet Transfer, [place]  contact guard assist  -SD minimum assist (75% patient effort)  -JR      Toilet Transfer, Assistive Device  rolling walker  -SD bedside commode without drop arms  -JR      Transfer, Safety Issues  balance decreased during  turns;sequencing ability decreased;step length decreased;weight-shifting ability decreased  -SD       Transfer, Impairments  strength decreased;impaired balance;pain  -SD pain;ROM decreased;strength decreased;motor control impaired  -JR      Functional Mobility    Functional Mobility- Ind. Level  contact guard assist  -SD       Functional Mobility- Device  rolling walker  -SD       Functional Mobility-Distance (Feet)  88  -SD       Functional Mobility- Safety Issues  balance decreased during turns;sequencing ability decreased;step length decreased;weight-shifting ability decreased  -SD       Upper Body Bathing Assessment/Training    UB Bathing Assess/Train, [place] Level  supervision required  -SD       Lower Body Bathing Assessment/Training    LB Bathing Assess/Train, [place] Level  moderate assist (50% patient effort)  -SD       Upper Body Dressing Assessment/Training    UB Dressing Assess/Train, [place]  supervision required  -SD       Lower Body Dressing Assessment/Training    LB Dressing Assess/Train, [place]  moderate assist (50% patient effort)  -SD       Toileting Assessment/Training    Toileting Assess/Train, [place] Level  contact guard assist  -SD       Grooming Assessment/Training    Grooming Assess/Train, [place] Level  supervision required  -SD       Positioning and Restraints    Pre-Treatment Position  in bed  -SD in bed  -JR      Post Treatment Position  chair  -SD bed  -JR      In Bed   supine;call light within reach;encouraged to call for assist  -JR      In Chair  reclined;call light within reach;encouraged to call for assist;with PT  -SD         User Key  (r) = Recorded By, (t) = Taken By, (c) = Cosigned By    Initials Name Effective Dates    JR Gale Jones, PT 10/26/16 -     CC Michelle Araiza, RN 10/26/16 -     RM Freddie Vargas, PTA 10/26/16 -     KR Osorio Abreu RN 11/07/16 -     SD Joanna Miller, OT 06/30/17 -            Occupational Therapy Education      Title: PT OT SLP Therapies (Active)     Topic: Occupational Therapy (Active)     Point: ADL training (Done)    Description: Instruct learner(s) on proper safety adaptation and remediation techniques during self care or transfers.   Instruct in proper use of assistive devices.    Learning Progress Summary    Learner Readiness Method Response Comment Documented by Status   Patient Acceptance E VU Benefits of OT and OT POC SD 10/11/17 1204 Done                      User Key     Initials Effective Dates Name Provider Type Discipline    SD 06/30/17 -  Joanna Miller OT Occupational Therapist OT                  OT Recommendation and Plan  Anticipated Discharge Disposition: home with home health  Therapy Frequency: 3-5 times/wk  Plan of Care Review  Plan Of Care Reviewed With: patient  Progress: no change  Outcome Summary/Follow up Plan: OT eval completed. Patient presents deficits in strength, endurance, balance, mobility and ADL performance. Patient is expected to benefit from OT services to improve overall functional performance and mobility prior to DC.           OT Goals       10/11/17 1201          Bed Mobility OT LTG    Bed Mobility OT LTG, Date Established 10/11/17  -SD      Bed Mobility OT LTG, Time to Achieve 2 wks  -SD      Bed Mobility OT LTG, Activity Type supine to sit/sit to supine  -SD      Bed Mobility OT LTG, [place] Level contact guard assist  -SD      Bed Mobility OT LTG, Assist Device bed rails  -SD      Bed Mobility OT LTG, Outcome goal ongoing  -SD      Strength OT LTG    Strength Goal OT LTG, Date Established 10/11/17  -SD      Strength Goal OT LTG, Time to Achieve 2 wks  -SD      Strength Goal OT LTG, Measure to Achieve patient will perform 15 reps UB ther ex using red therband in order to increase strength and endurance.   -SD      Strength Goal OT LTG, Outcome goal ongoing  -SD      Patient Education OT LTG    Patient Education OT LTG, Date Established 10/11/17  -SD      Patient Education  OT LTG, Time to Achieve 2 wks  -SD      Patient Education OT LTG, Education Type adaptive equipment mgmt  -SD      Patient Education OT LTG, Education Understanding demonstrates adequately;verbalizes understanding  -SD      Patient Education OT LTG Outcome goal ongoing  -SD      LB Dressing OT LTG    LB Dressing Goal OT LTG, Date Established 10/11/17  -SD      LB Dressing Goal OT LTG, Time to Achieve 2 wks  -SD      LB Dressing Goal OT LTG, [place] Level supervision required  -SD      LB Dressing Goal OT LTG, Adaptive Equipment reacher;sock-aid  -SD      LB Dressing Goal OT LTG, Outcome goal ongoing  -SD      Functional Mobility OT LTG    Functional Mobility Goal OT LTG, Date Established 10/11/17  -SD      Functional Mobility Goal OT LTG, Time to Achieve 2 wks  -SD      Functional Mobility Goal OT LTG, [place] Level contact guard  -SD      Functional Mobility Goal OT LTG, Assist Device rolling walker  -SD      Functional Mobility Goal OT LTG, Distance to Achieve in hallway  -SD      Functional Mobility Goal OT LTG, Additional Goal 150  -SD      Functional Mobility Goal OT LTG, Outcome goal ongoing  -SD        User Key  (r) = Recorded By, (t) = Taken By, (c) = Cosigned By    Initials Name Provider Type    ELADIO Miller OT Occupational Therapist                Outcome Measures       10/11/17 1024 10/10/17 1917       How much help from another person do you currently need...    Turning from your back to your side while in flat bed without using bedrails?  3  -JR     Moving from lying on back to sitting on the side of a flat bed without bedrails?  3  -JR     Moving to and from a bed to a chair (including a wheelchair)?  3  -JR     Standing up from a chair using your arms (e.g., wheelchair, bedside chair)?  3  -JR     Climbing 3-5 steps with a railing?  2  -JR     To walk in hospital room?  3  -JR     AM-PAC 6 Clicks Score  17  -JR     How much help from another is currently needed...    Putting on and  taking off regular lower body clothing? 2  -SD      Bathing (including washing, rinsing, and drying) 2  -SD      Toileting (which includes using toilet bed pan or urinal) 3  -SD      Putting on and taking off regular upper body clothing 4  -SD      Taking care of personal grooming (such as brushing teeth) 4  -SD      Eating meals 4  -SD      Score 19  -SD      Functional Assessment    Outcome Measure Options AM-PAC 6 Clicks Daily Activity (OT)  -SD AM-PAC 6 Clicks Basic Mobility (PT)  -JR       User Key  (r) = Recorded By, (t) = Taken By, (c) = Cosigned By    Initials Name Provider Type    JR Gale Jones, PT Physical Therapist    SD Joanna Miller, OT Occupational Therapist          Time Calculation:   OT Start Time: 1024    Therapy Charges for Today     Code Description Service Date Service Provider Modifiers Qty    [id number]  OT EVAL LOW COMPLEXITY 4 10/11/2017 Joanna Miller OT GO 1               Joanna Miller OT  10/11/2017

## 2017-10-11 NOTE — PLAN OF CARE
Problem: Patient Care Overview (Adult)  Goal: Plan of Care Review  Outcome: Ongoing (interventions implemented as appropriate)    10/11/17 1221   Coping/Psychosocial Response Interventions   Plan Of Care Reviewed With patient   Patient Care Overview   Progress improving   Outcome Evaluation   Outcome Summary/Follow up Plan Pt presented with improved mobility tolerance this date evident by increased ambulation distance and gait pattern as well as tolerance of ther ex. See flow sheet for details.          Problem: Inpatient Physical Therapy  Goal: Bed Mobility Goal LTG- PT  Outcome: Ongoing (interventions implemented as appropriate)    10/10/17 2022 10/11/17 1221   Bed Mobility PT LTG   Bed Mobility PT LTG, Date Established 10/10/17 --    Bed Mobility PT LTG, Time to Achieve 2 wks --    Bed Mobility PT LTG, Activity Type all bed mobility --    Bed Mobility PT LTG, [place] Level contact guard assist --    Bed Mobility PT LTG, Outcome --  goal ongoing       Goal: Transfer Training Goal 1 LTG- PT  Outcome: Ongoing (interventions implemented as appropriate)    10/10/17 2022 10/11/17 1221   Transfer Training PT LTG   Transfer Training PT LTG, Date Established 10/10/17 --    Transfer Training PT LTG, Time to Achieve 2 wks --    Transfer Training PT LTG, Activity Type sit to stand/stand to sit;bed to chair /chair to bed --    Transfer Training PT LTG, [place] Level contact guard assist --    Transfer Training PT LTG, Assist Device walker, rolling --    Transfer Training PT LTG, Outcome --  goal ongoing       Goal: Gait Training Goal LTG- PT  Outcome: Ongoing (interventions implemented as appropriate)    10/10/17 2022 10/11/17 1221   Gait Training PT LTG   Gait Training Goal PT LTG, Date Established 10/10/17 --    Gait Training Goal PT LTG, Time to Achieve 2 wks --    Gait Training Goal PT LTG, [place] Level contact guard assist --    Gait Training Goal PT LTG, Assist Device walker, rolling --    Gait Training  Goal PT LTG, Distance to Achieve 250 --    Gait Training Goal PT LTG, Outcome --  goal ongoing

## 2017-10-11 NOTE — PROGRESS NOTES
Discharge Planning Assessment  [facility]     Patient Name: Nicole Mack  MRN: [id number]  Today's Date: 10/11/2017    Admit Date: 10/10/2017          Discharge Needs Assessment       10/11/17 1331    Living Environment    Provides Primary Care For child(james)    Quality Of Family Relationships supportive    Able to Return to Prior Living Arrangements yes    Discharge Needs Assessment    Concerns To Be Addressed discharge planning concerns    Readmission Within The Last 30 Days no previous admission in last 30 days    Anticipated Changes Related to Illness none    Equipment Needed After Discharge none    Discharge Facility/Level Of Care Needs other (see comments);home with home health            Discharge Plan       10/11/17 8776    Case Management/Social Work Plan    Plan Spoke to pt and  in room, permission granted to speak in front of .  Lives in 1 story house with . Has 3 steps and  can assist her with those.  Has a WC, rolling walker and BSC.  No home equipmemt needed.  Lives in [facility] and if needs HH choses [facility],  ([facility]).  Called to Florence at [facility], and stated if pt needs PT will only admit pts for PT on Thursdays.  In addition since pt is [facility] will need to get precert for first visit.  Informed CM has never been ask to get precert for HH.  Also will ck with provider to see if can go to OutPT PT because will be discharged tomorrow and will need to be seen before next Thursday.  Per Florence will speak to her supervisor about precert and get back to case management.          Discharge Placement     No information found                Demographic Summary       10/11/17 1332    Referral Information    Admission Type inpatient    Record Reviewed history and physical;medical record    Contact Information    Permission Granted to Share Information With other (see comments)    Comments             Functional Status        10/11/17 1333    IADL    Medications independent    Meal Preparation independent    Housekeeping independent    Laundry independent    Shopping independent    Oral Care independent    Activity Tolerance    Current Activity Limitations none    Cognitive/Perceptual/Developmental    Current Mental Status/Cognitive Functioning no deficits noted    Recent Changes in Mental Status/Cognitive Functioning no changes            Psychosocial     None            Abuse/Neglect     None            Legal     None            Substance Abuse     None            Patient Forms     None          Roseanne Calvo

## 2017-10-11 NOTE — PROGRESS NOTES
Patient: Nicole Mack  Procedure(s):  ELECTIVE LEFT TOTAL KNEE ARTHROPLASTY (S&N)  Anesthesia type: [unfilled]    Patient location: [facility] Floor  Last vitals:   Vitals:    10/11/17 0443   BP: 137/70   Pulse: 77   Resp: 18   Temp: 97.8 °F (36.6 °C)   SpO2: 97%     Level of consciousness: awake, alert and oriented    Post-anesthesia pain: adequate analgesia  Airway patency: patent  Respiratory: unassisted  Cardiovascular: stable and blood pressure at baseline  Hydration: euvolemic    Anesthetic complications: no, pt awake and alert working with PT. Pain controlled with IV pain medications per Dr. Britt.

## 2017-10-11 NOTE — PLAN OF CARE
Problem: Patient Care Overview (Adult)  Goal: Plan of Care Review  Outcome: Ongoing (interventions implemented as appropriate)    10/10/17 2022   Coping/Psychosocial Response Interventions   Plan Of Care Reviewed With patient;spouse;daughter   Outcome Evaluation   Outcome Summary/Follow up Plan Patient participates well in PT evaluation and is expected to benefit from additional PT services while hospitalized and upon discharge to home with home health care services.         Problem: Inpatient Physical Therapy  Goal: Bed Mobility Goal LTG- PT  Outcome: Ongoing (interventions implemented as appropriate)    10/10/17 2022   Bed Mobility PT LTG   Bed Mobility PT LTG, Date Established 10/10/17   Bed Mobility PT LTG, Time to Achieve 2 wks   Bed Mobility PT LTG, Activity Type all bed mobility   Bed Mobility PT LTG, [place] Level contact guard assist       Goal: Transfer Training Goal 1 LTG- PT  Outcome: Ongoing (interventions implemented as appropriate)    10/10/17 2022   Transfer Training PT LTG   Transfer Training PT LTG, Date Established 10/10/17   Transfer Training PT LTG, Time to Achieve 2 wks   Transfer Training PT LTG, Activity Type sit to stand/stand to sit;bed to chair /chair to bed   Transfer Training PT LTG, [place] Level contact guard assist   Transfer Training PT LTG, Assist Device walker, rolling       Goal: Gait Training Goal LTG- PT  Outcome: Ongoing (interventions implemented as appropriate)    10/10/17 2022   Gait Training PT LTG   Gait Training Goal PT LTG, Date Established 10/10/17   Gait Training Goal PT LTG, Time to Achieve 2 wks   Gait Training Goal PT LTG, [place] Level contact guard assist   Gait Training Goal PT LTG, Assist Device walker, rolling   Gait Training Goal PT LTG, Distance to Achieve 250

## 2017-10-11 NOTE — THERAPY EVALUATION
Acute Care - Physical Therapy Initial Evaluation  [facility]     Patient Name: Nicole Mack  : 1955  MRN: [id number]  Today's Date: 10/10/2017   Onset of Illness/Injury or Date of Surgery Date: 10/10/17  Date of Referral to PT: 10/10/17  Referring Physician: Jerrell Britt MD      Admit Date: 10/10/2017     Visit Dx:    ICD-10-CM ICD-9-CM   1. Impaired functional mobility, balance, gait, and endurance Z74.09 V49.89   2. Primary osteoarthritis of left knee M17.12 715.16   3. Pre-op testing Z01.818 V72.84     Patient Active Problem List   Diagnosis   • Concentration deficit   • Microangiopathy   • Migraine without aura and without status migrainosus, not intractable   • B12 deficiency   • Pre-op testing   • Primary osteoarthritis of left knee   • OA (osteoarthritis) of knee     Past Medical History:   Diagnosis Date   • Arthritis of back    • Disease of thyroid gland    • Fracture     LEFT PINKY TOE AT PRESENT TIME, RIGHT ANKLE WHEN A TEENAGER   • GERD (gastroesophageal reflux disease)    • H/O seasonal allergies    • High cholesterol    • [place] (hard of hearing)     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 REPORTS WEAKNESS IN EXTREMITIES FROM THIS.    • Valgus deformity, not elsewhere classified, right knee    • Wears glasses    • Wears partial dentures     UPPER PLATE     Past Surgical History:   Procedure Laterality Date   • BREAST CYST EXCISION Left     REPORTS BENINGN, REPORTS NO RESTRICTIONS ON LUE   • CATARACT EXTRACTION W/ INTRAOCULAR LENS IMPLANT Left 2017    Procedure: CATARACT PHACO EXTRACTION WITH INTRAOCULAR LENS IMPLANT LEFT;  Surgeon: Jw Mansfield MD;  Location: [facility];  Service:    • CATARACT EXTRACTION W/ INTRAOCULAR LENS IMPLANT Right 2017    Procedure: CATARACT PHACO EXTRACTION WITH INTRAOCULAR LENS IMPLANT RIGHT;   Surgeon: Jw Mansfield MD;  Location: [facility] OR;  Service:    • JOINT REPLACEMENT Right     KNEE REPLACEMENT   • MOUTH SURGERY      ORAL EXTRACTIONS   • OOPHORECTOMY Left    • TOTAL KNEE ARTHROPLASTY Right 02/09/2016   • TUBAL ABDOMINAL LIGATION            PT ASSESSMENT (last 72 hours)      PT Evaluation       10/10/17 1916 10/10/17 1235    Rehab Evaluation    Document Type evaluation  -JR     Subjective Information agree to therapy;complains of;pain;dizziness  -JR     Patient Effort, Rehab Treatment good  -JR     Symptoms Noted During/After Treatment dizziness  -JR     General Information    Patient Profile Review yes  -JR     Onset of Illness/Injury or Date of Surgery Date 10/10/17  -JR     Referring Physician Jerrell Britt MD  -JR     General Observations Supine with oxygen at 2 LPM per nasal cannula, IV in RUE, dressing intact on left knee  -JR     Pertinent History Of Current Problem OA in left knee s/p TKA  -JR     Precautions/Limitations fall precautions  -JR     Prior Level of Function independent:;community mobility  -JR     Equipment Currently Used at Home none  -JR none  -KR    Plans/Goals Discussed With patient and family;agreed upon  -JR     Risks Reviewed patient and family:;increased discomfort;dizziness  -JR     Benefits Reviewed patient and family:;increase balance;increase strength;increase independence;improve function  -JR     Barriers to Rehab none identified  -JR     Living Environment    Lives With spouse  -JR spouse;child(james), adult;other (see comments)   grandchildren  -KR    Living Arrangements house  -JR house  -KR    Home Accessibility bed and bath on same level;stairs to enter home  -JR no concerns;bed and bath on same level  -KR    Number of Stairs to Enter Home 2  -JR     Stair Railings at Home  none  -KR    Type of Financial/Environmental Concern  none  -KR    Transportation Available  car;family or friend will provide  -KR    Clinical Impression    Date of Referral to PT 10/10/17   -JR     PT Diagnosis L knee decreased AROM & strength, gait abn, poor balance  -JR     Prognosis Good  -JR     Patient/Family Goals Statement Patient wants to go home  -JR     Criteria for Skilled Therapeutic Interventions Met yes;treatment indicated  -JR     Pathology/Pathophysiology Noted (Describe Specifically for Each System) musculoskeletal;neuromuscular  -JR     Impairments Found (describe specific impairments) gait, locomotion, and balance;aerobic capacity/endurance;joint integrity and mobility;ROM  -JR     Rehab Potential good, to achieve stated therapy goals  -JR     Pain Assessment    Pain Assessment 0-10  -JR     Pain Score 6  -JR     Post Pain Score 6  -JR     Pain Type Surgical pain;Acute pain  -JR     Pain Location Knee  -JR     Pain Orientation Left  -JR     Pain Intervention(s) Repositioned;Ambulation/increased activity;Medication (See MAR)  -JR     Response to Interventions no increase in pain with PT  -JR     Cognitive Assessment/Intervention    Current Cognitive/Communication Assessment functional  -JR     Orientation Status oriented x 4  -JR     Follows Commands/Answers Questions able to follow multi-step instructions  -JR     Personal Safety decreased awareness, need for assist;decreased awareness, need for safety  -JR     Personal Safety Interventions gait belt;fall prevention program maintained;nonskid shoes/slippers when out of bed  -JR     ROM (Range of Motion)    General ROM Detail WFL except L knee 8-65  -JR     MMT (Manual Muscle Testing)    General MMT Assessment Detail LLE= 2/5 knee  -JR     Mobility Assessment/Training    Extremity Weight-Bearing Status left lower extremity  -JR     Left Lower Extremity Weight-Bearing weight-bearing as tolerated  -JR     Bed Mobility, Assessment/Treatment    Bed Mobility, Assistive Device head of bed elevated;bed rails  -JR     Bed Mob, Supine to Sit, [place] minimum assist (75% patient effort)  -JR     Bed Mob, Sit to Supine, [place] minimum  assist (75% patient effort)  -     Bed Mobility, Safety Issues decreased use of legs for bridging/pushing  -     Bed Mobility, Impairments ROM decreased;strength decreased;coordination impaired;postural control impaired;pain  -JR     Transfer Assessment/Treatment    Transfers, Sit-Stand [place] minimum assist (75% patient effort)  -JR     Transfers, Stand-Sit [place] minimum assist (75% patient effort)  -JR     Transfers, Sit-Stand-Sit, Assist Device rolling walker  -JR     Toilet Transfer, [place] minimum assist (75% patient effort)  -     Toilet Transfer, Assistive Device bedside commode without drop arms  -     Transfer, Impairments pain;ROM decreased;strength decreased;motor control impaired  -     Gait Assessment/Treatment    Gait, [place] Level minimum assist (75% patient effort)  -     Gait, Assistive Device rolling walker  -     Gait, Distance (Feet) 3  -     Gait, Gait Pattern Analysis swing-to gait  -     Gait, Gait Deviations antalgic;stride width increased;stride length decreased;ailyn decreased;forward flexed posture  -     Gait, Safety Issues step length decreased;sequencing ability decreased;balance decreased during turns  -     Gait, Impairments pain;ROM decreased;strength decreased;coordination impaired;postural control impaired  -     Positioning and Restraints    Pre-Treatment Position in bed  -JR     Post Treatment Position bed  -JR     In Bed supine;call light within reach;encouraged to call for assist  -       User Key  (r) = Recorded By, (t) = Taken By, (c) = Cosigned By    Initials Name Provider Type    JR Gale Jones PT Physical Therapist    LLOYD Abreu, RN Registered Nurse          Physical Therapy Education     Title: PT OT SLP Therapies (Active)     Topic: Physical Therapy (Active)     Point: Mobility training (Done)    Learning Progress Summary    Learner Readiness Method Response Comment Documented by Status   Patient Acceptance E VU  Importance of mobility to recovery  10/10/17 2020 Done                      User Key     Initials Effective Dates Name Provider Type Discipline     10/26/16 -  Gale Jones, PT Physical Therapist PT                PT Recommendation and Plan  Anticipated Discharge Disposition: home with home health  Planned Therapy Interventions: balance training, ROM (Range of Motion), strengthening, bed mobility training, transfer training, gait training, patient/family education  PT Frequency: 2 times/day  Plan of Care Review  Plan Of Care Reviewed With: patient, spouse, daughter  Outcome Summary/Follow up Plan: Patient participates well in PT evaluation and is expected to benefit from additional PT services while hospitalized and upon discharge to home with home health care services.          IP PT Goals       10/10/17 2022          Bed Mobility PT LTG    Bed Mobility PT LTG, Date Established 10/10/17  -      Bed Mobility PT LTG, Time to Achieve 2 wks  -JR      Bed Mobility PT LTG, Activity Type all bed mobility  -JR      Bed Mobility PT LTG, [place] Level contact guard assist  -JR      Transfer Training PT LTG    Transfer Training PT LTG, Date Established 10/10/17  -      Transfer Training PT LTG, Time to Achieve 2 wks  -JR      Transfer Training PT LTG, Activity Type sit to stand/stand to sit;bed to chair /chair to bed  -JR      Transfer Training PT LTG, [place] Level contact guard assist  -JR      Transfer Training PT LTG, Assist Device walker, rolling  -JR      Gait Training PT LTG    Gait Training Goal PT LTG, Date Established 10/10/17  -      Gait Training Goal PT LTG, Time to Achieve 2 wks  -JR      Gait Training Goal PT LTG, [place] Level contact guard assist  -JR      Gait Training Goal PT LTG, Assist Device walker, rolling  -JR      Gait Training Goal PT LTG, Distance to Achieve 250  -JR        User Key  (r) = Recorded By, (t) = Taken By, (c) = Cosigned By    Initials Name Provider Type    JR  Gale Jones, PT Physical Therapist                Outcome Measures       10/10/17 1917          How much help from another person do you currently need...    Turning from your back to your side while in flat bed without using bedrails? 3  -JR      Moving from lying on back to sitting on the side of a flat bed without bedrails? 3  -JR      Moving to and from a bed to a chair (including a wheelchair)? 3  -JR      Standing up from a chair using your arms (e.g., wheelchair, bedside chair)? 3  -JR      Climbing 3-5 steps with a railing? 2  -JR      To walk in hospital room? 3  -JR      AM-PAC 6 Clicks Score 17  -JR      Functional Assessment    Outcome Measure Options AM-PAC 6 Clicks Basic Mobility (PT)  -JR        User Key  (r) = Recorded By, (t) = Taken By, (c) = Cosigned By    Initials Name Provider Type    JR Gale Jones PT Physical Therapist           Time Calculation:         PT Charges       10/10/17 2021          Time Calculation    Start Time 1916  -JR      PT Received On 10/10/17  -      PT Goal Re-Cert Due Date 10/20/17  -        User Key  (r) = Recorded By, (t) = Taken By, (c) = Cosigned By    Initials Name Provider Type    JR Gale Jones PT Physical Therapist          Therapy Charges for Today     Code Description Service Date Service Provider Modifiers Qty    94072028645 HC PT EVAL LOW COMPLEXITY 4 10/10/2017 Gale Jones, PT GP 1          PT G-Codes  Outcome Measure Options: AM-PAC 6 Clicks Basic Mobility (PT)      Gale Jones PT  10/10/2017

## 2017-10-12 VITALS
TEMPERATURE: 98.3 F | HEART RATE: 97 BPM | HEIGHT: 58 IN | WEIGHT: 133.8 LBS | OXYGEN SATURATION: 93 % | DIASTOLIC BLOOD PRESSURE: 57 MMHG | SYSTOLIC BLOOD PRESSURE: 131 MMHG | BODY MASS INDEX: 28.09 KG/M2 | RESPIRATION RATE: 16 BRPM

## 2017-10-12 LAB
HBV SURFACE AG SERPL QL IA: NEGATIVE
HEPATITIS C RNA-NAA: NEGATIVE

## 2017-10-12 PROCEDURE — 94799 UNLISTED PULMONARY SVC/PX: CPT

## 2017-10-12 PROCEDURE — 97116 GAIT TRAINING THERAPY: CPT

## 2017-10-12 PROCEDURE — 97110 THERAPEUTIC EXERCISES: CPT

## 2017-10-12 PROCEDURE — 99232 SBSQ HOSP IP/OBS MODERATE 35: CPT | Performed by: INTERNAL MEDICINE

## 2017-10-12 PROCEDURE — 25010000002 FONDAPARINUX PER 0.5 MG: Performed by: ORTHOPAEDIC SURGERY

## 2017-10-12 PROCEDURE — 99024 POSTOP FOLLOW-UP VISIT: CPT | Performed by: PHYSICIAN ASSISTANT

## 2017-10-12 PROCEDURE — 97535 SELF CARE MNGMENT TRAINING: CPT

## 2017-10-12 RX ORDER — HYDROCODONE BITARTRATE AND ACETAMINOPHEN 7.5; 325 MG/1; MG/1
1 TABLET ORAL EVERY 6 HOURS PRN
Qty: 40 TABLET | Refills: 0 | Status: SHIPPED | OUTPATIENT
Start: 2017-10-12 | End: 2017-10-23 | Stop reason: SDUPTHER

## 2017-10-12 RX ADMIN — PANTOPRAZOLE SODIUM 40 MG: 40 TABLET, DELAYED RELEASE ORAL at 09:59

## 2017-10-12 RX ADMIN — SODIUM CHLORIDE, POTASSIUM CHLORIDE, SODIUM LACTATE AND CALCIUM CHLORIDE 100 ML/HR: 600; 310; 30; 20 INJECTION, SOLUTION INTRAVENOUS at 00:08

## 2017-10-12 RX ADMIN — LEVOTHYROXINE SODIUM 75 MCG: 75 TABLET ORAL at 06:20

## 2017-10-12 RX ADMIN — HYDROCODONE BITARTRATE AND ACETAMINOPHEN 1 TABLET: 7.5; 325 TABLET ORAL at 06:23

## 2017-10-12 RX ADMIN — HYDROCODONE BITARTRATE AND ACETAMINOPHEN 1 TABLET: 7.5; 325 TABLET ORAL at 15:16

## 2017-10-12 RX ADMIN — CETIRIZINE HYDROCHLORIDE 10 MG: 10 TABLET, FILM COATED ORAL at 09:59

## 2017-10-12 RX ADMIN — FONDAPARINUX SODIUM 2.5 MG: 2.5 INJECTION, SOLUTION SUBCUTANEOUS at 09:59

## 2017-10-12 NOTE — PLAN OF CARE
Problem: Patient Care Overview (Adult)  Goal: Plan of Care Review  Outcome: Ongoing (interventions implemented as appropriate)    10/12/17 0423   Coping/Psychosocial Response Interventions   Plan Of Care Reviewed With patient   Patient Care Overview   Progress improving         Problem: Knee Replacement, Total (Adult)  Goal: Signs and Symptoms of Listed Potential Problems Will be Absent or Manageable (Knee Replacement, Total)  Outcome: Ongoing (interventions implemented as appropriate)    10/12/17 0423   Knee Replacement, Total   Problems Assessed (Total Knee Replacement) all   Problems Present (Total Knee Replacement) pain;functional decline/self care deficit         Problem: Fall Risk (Adult)  Goal: Identify Related Risk Factors and Signs and Symptoms  Outcome: Outcome(s) achieved Date Met:  10/12/17    10/12/17 0423   Fall Risk   Fall Risk: Related Risk Factors gait/mobility problems   Fall Risk: Signs and Symptoms presence of risk factors       Goal: Absence of Falls  Outcome: Ongoing (interventions implemented as appropriate)    10/12/17 0423   Fall Risk (Adult)   Absence of Falls making progress toward outcome

## 2017-10-12 NOTE — PROGRESS NOTES
[facility]    Name:  Nicole Mack   Age:  61 y.o.  Sex:  female  :  1955  MRN:  [id number]   Visit Number:  [id number]  Admission Date:  10/10/2017  Date Of Service:  10/12/17  Primary Care Physician:  DANIEL Dela Cruz     LOS: 2 days :  Patient Care Team:  DANIEL Dela Cruz as PCP - General (Family Medicine):    History taken from:     patient    Chief Complaint:      Knee pain    Subjective     Interval History:     Patient seen again today.  Patient is admitted with left total knee arthroplasty for osteoarthritis.  She has a history of CVA, dementia, GERD, hypothyroidism.  She has no complaints today.  She was hoping to go home.  She denies any chest pressure, shortness breath, nausea, vomiting, or diarrhea.    Review of Systems:     All systems were reviewed and negative     Objective     Vital Signs:    Temp:  [98.3 °F (36.8 °C)-99.5 °F (37.5 °C)] 98.3 °F (36.8 °C)  Heart Rate:  [88-97] 97  Resp:  [14-20] 16  BP: (124-143)/(52-64) 131/57    Physical Exam:      General Appearance:    Alert, cooperative, in no acute distress   Head:    Normocephalic, without obvious abnormality, atraumatic   Eyes:            Lids and lashes normal, conjunctivae and sclerae normal, no   icterus, no pallor, corneas clear, PERRLA   Ears:    Ears appear intact with no abnormalities noted   Throat:   No oral lesions, no thrush, oral mucosa moist   Neck:   No adenopathy, supple, trachea midline, no thyromegaly, no     carotid bruit, no JVD   Back:     No kyphosis present, no scoliosis present, no skin lesions,       erythema or scars, no tenderness to percussion or                   palpation,   range of motion normal   Lungs:     Clear to auscultation,respirations regular, even and                   unlabored    Heart:    Regular rhythm and normal rate, normal S1 and S2, no            murmur, no gallop, no rub, no click   Breast Exam:    Deferred   Abdomen:     Normal  bowel sounds, no masses, no organomegaly, soft        non-tender, non-distended, no guarding, no rebound                 tenderness   Genitalia:    Deferred   Extremities:   Moves all extremities well, no edema, no cyanosis, no              redness   Pulses:   Pulses palpable and equal bilaterally   Skin:   No bleeding, bruising or rash   Lymph nodes:   No palpable adenopathy   Neurologic:   Cranial nerves 2 - 12 grossly intact, sensation intact, DTR        present and equal bilaterally          Results Review:      I reviewed the patient's new clinical results.    Labs:  Lab Results (last 24 hours)     Procedure Component Value Units Date/Time    Needle Stick Pt Source [[id number]] Collected:  10/10/17 1755    Specimen:  Blood Updated:  10/12/17 0823    Narrative:       The following orders were created for panel order Needle Stick Pt Source.  Procedure                               Abnormality         Status                     ---------                               -----------         ------                     HCV RNA, PCR, Qualitative[[id number]]                        In process                 Hepatitis B Surface Antigen[[id number]]                      Final result               HIV-1 / O / 2 Ag / Antib...[[id number]]                      In process                   Please view results for these tests on the individual orders.    Hepatitis B Surface Antigen [[id number]] Collected:  10/10/17 1755    Specimen:  Blood Updated:  10/12/17 0823     Hepatitis B Surface Ag Negative    Narrative:       Performed at:  [address] [place] [address]  [id number]  : Francisco J Lea PhD, Phone:  [phone]           Radiology:    Imaging Results (last 24 hours)     ** No results found for the last 24 hours. **          Medication Review:       atorvastatin 10 mg Oral Nightly   cetirizine 10 mg Oral Daily   [START ON 10/31/2017] cyanocobalamin 1,000 mcg Intramuscular Q28 Days   fondaparinux 2.5  mg Subcutaneous Q24H   levothyroxine 75 mcg Oral Q AM   pantoprazole 40 mg Oral QAM         lactated ringers 100 mL/hr Last Rate: 100 mL/hr (10/12/17 0008)       Assessment/Plan     Problem List Items Addressed This Visit     Pre-op testing    Overview     Added automatically from request for surgery [id number]         Relevant Medications    ceFAZolin (ANCEF) IVPB (duplex) 2 g (Completed)    Other Relevant Orders    Inpatient Admission (Completed)    Tissue Pathology Exam - Bone, Knee, Left    Primary osteoarthritis of left knee    Overview     Added automatically from request for surgery [id number]         Relevant Medications    ceFAZolin (ANCEF) IVPB (duplex) 2 g (Completed)    Other Relevant Orders    Inpatient Admission (Completed)    Tissue Pathology Exam - Bone, Knee, Left      Other Visit Diagnoses     S/P total knee replacement, left        Relevant Orders    Ambulatory Referral to Physical Therapy Evaluate and treat, Ortho          1.  Status post left total knee arthroplasty for osteoarthritis  2.  History of CVA with no known deficits  3.  Dementia mild  4.  GERD  5.  Hypothyroidism    Plan:    Continue with current medication regimen.  Monitor lab work.  Patient should be discharged home within the next few days.    Russel Sánchez,   10/12/17  3:29 PM

## 2017-10-12 NOTE — PLAN OF CARE
Problem: Patient Care Overview (Adult)  Goal: Plan of Care Review  Outcome: Ongoing (interventions implemented as appropriate)    10/12/17 1113   Coping/Psychosocial Response Interventions   Plan Of Care Reviewed With patient   Patient Care Overview   Progress improving   Outcome Evaluation   Outcome Summary/Follow up Plan OT tx completed. Patient participated in activities to progress bed mobility, functional transfers and mobility. Continue OT POC.          Problem: Inpatient Occupational Therapy  Goal: Bed Mobility Goal LTG- OT  Outcome: Ongoing (interventions implemented as appropriate)    10/11/17 1201 10/12/17 1113   Bed Mobility OT LTG   Bed Mobility OT LTG, Date Established 10/11/17 --    Bed Mobility OT LTG, Time to Achieve 2 wks --    Bed Mobility OT LTG, Activity Type supine to sit/sit to supine --    Bed Mobility OT LTG, [place] Level contact guard assist --    Bed Mobility OT LTG, Assist Device bed rails --    Bed Mobility OT LTG, Date Goal Reviewed --  10/12/17   Bed Mobility OT LTG, Outcome --  goal ongoing       Goal: Strength Goal LTG- OT  Outcome: Outcome(s) achieved Date Met:  10/12/17    10/11/17 1201 10/12/17 1113   Strength OT LTG   Strength Goal OT LTG, Date Established 10/11/17 --    Strength Goal OT LTG, Time to Achieve 2 wks --    Strength Goal OT LTG, Measure to Achieve patient will perform 15 reps UB ther ex using red therband in order to increase strength and endurance.  --    Strength Goal OT LTG, Date Goal Reviewed --  10/12/17   Strength Goal OT LTG, Outcome --  goal met       Goal: Patient Education Goal LTG- OT  Outcome: Outcome(s) achieved Date Met:  10/12/17    10/11/17 1201 10/12/17 1113   Patient Education OT LTG   Patient Education OT LTG, Date Established 10/11/17 --    Patient Education OT LTG, Time to Achieve 2 wks --    Patient Education OT LTG, Education Type adaptive equipment mgmt --    Patient Education OT LTG, Education Understanding demonstrates  adequately;verbalizes understanding --    Patient Education OT LTG, Date Goal Reviewed --  10/12/17   Patient Education OT LTG Outcome --  goal met       Goal: LB Dressing Goal LTG- OT  Outcome: Outcome(s) achieved Date Met:  10/12/17    10/11/17 1201 10/12/17 1113   LB Dressing OT LTG   LB Dressing Goal OT LTG, Date Established 10/11/17 --    LB Dressing Goal OT LTG, Time to Achieve 2 wks --    LB Dressing Goal OT LTG, [place] Level supervision required --    LB Dressing Goal OT LTG, Adaptive Equipment reacher;sock-aid --    LB Dressing Goal OT LTG, Date Goal Reviewed --  10/12/17   LB Dressing Goal OT LTG, Outcome --  goal met       Goal: Functional Mobility Goal LTG- OT  Outcome: Ongoing (interventions implemented as appropriate)    10/11/17 1201 10/12/17 1113   Functional Mobility OT LTG   Functional Mobility Goal OT LTG, Date Established 10/11/17 --    Functional Mobility Goal OT LTG, Time to Achieve 2 wks --    Functional Mobility Goal OT LTG, [place] Level contact guard --    Functional Mobility Goal OT LTG, Assist Device rolling walker --    Functional Mobility Goal OT LTG, Distance to Achieve in hallway --    Functional Mobility Goal OT LTG, Additional Goal 150 --    Functional Mobility Goal OT LTG, Date Goal Reviewed --  10/12/17   Functional Mobility Goal OT LTG, Outcome --  goal ongoing

## 2017-10-12 NOTE — PROGRESS NOTES
"Continued Stay Note   [place]     Patient Name: Nicole Mack  MRN: [id number]  Today's Date: 10/12/2017    Admit Date: 10/10/2017          Discharge Plan       10/12/17 1251    Case Management/Social Work Plan    Plan Spoke to pt in room, understand will be going to out pt PT and is Ok with it.   states he will transport her.  Concerned about given \"blood thinner shot\", explained clinical nurse will  give instructions concerning  the injection.  Spoke with Faustino HERNANDEZ and informed.  Cm will continue to follow.                Discharge Codes     None        Expected Discharge Date and Time     Expected Discharge Date Expected Discharge Time    Oct 12, 2017             Roseanne Calvo    "

## 2017-10-12 NOTE — THERAPY TREATMENT NOTE
Acute Care - Occupational Therapy Treatment Note  [facility]     Patient Name: Nicole Mack  : 1955  MRN: [id number]  Today's Date: 10/12/2017  Onset of Illness/Injury or Date of Surgery Date: 10/10/17  Date of Referral to OT: 10/10/17  Referring Physician: Dr. Britt      Admit Date: 10/10/2017    Visit Dx:     ICD-10-CM ICD-9-CM   1. Impaired functional mobility, balance, gait, and endurance Z74.09 V49.89   2. Primary osteoarthritis of left knee M17.12 715.16   3. Pre-op testing Z01.818 V72.84   4. Impaired mobility and ADLs Z74.09 799.89     Patient Active Problem List   Diagnosis   • Concentration deficit   • Microangiopathy   • Migraine without aura and without status migrainosus, not intractable   • B12 deficiency   • Pre-op testing   • Primary osteoarthritis of left knee   • OA (osteoarthritis) of knee             Adult Rehabilitation Note       10/12/17 0942 10/11/17 1032       Rehab Assessment/Intervention    Discipline occupational therapist  -SD physical therapy assistant  -RM     Document Type therapy note (daily note)  -SD therapy note (daily note)  -RM     Subjective Information agree to therapy;complains of;pain  -SD agree to therapy;complains of;weakness;pain  -RM     Patient Effort, Rehab Treatment adequate  -SD adequate  -RM     Symptoms Noted During/After Treatment none  -SD increased pain  -RM     Precautions/Limitations  fall precautions  -RM     Specific Treatment Considerations  2 lpm pnc   -RM     Recorded by [SD] Joanna Miller OT [RM] Freddie Vargas, PTA     Vital Signs    Pre SpO2 (%)  92  -RM     O2 Delivery Pre Treatment  supplemental O2  -RM     Intra SpO2 (%)  94  -RM     O2 Delivery Intra Treatment  supplemental O2  -RM     Post SpO2 (%)  95  -RM     O2 Delivery Post Treatment  supplemental O2  -RM     Pre Patient Position  Supine  -RM     Intra Patient Position  Standing  -RM     Post Patient Position  Sitting  -RM     Recorded by  [RM] Freddie Vargas, PTA      Pain Assessment    Pain Assessment 0-10  -SD 0-10  -RM     Pain Score 2  -SD 7  -RM     Post Pain Score 2  -SD 8  -RM     Pain Type Acute pain;Surgical pain  -SD Acute pain;Surgical pain  -RM     Pain Location Knee  -SD Knee  -RM     Pain Orientation Left  -SD Left  -RM     Pain Descriptors  Aching;Discomfort  -RM     Pain Frequency  Constant/continuous  -RM     Pain Onset  Ongoing  -RM     Pain Intervention(s)  Repositioned;Ambulation/increased activity;Cold pack  -RM     Response to Interventions  tolerated,informed nurse   -RM     Recorded by [SD] Joanna Miller OT [RM] Freddie Vargas PTA     Bed Mobility, Assessment/Treatment    Bed Mobility, Assistive Device  bed rails;head of bed elevated  -RM     Bed Mob, Supine to Sit, [place]  verbal cues required;minimum assist (75% patient effort)  -RM     Bed Mobility, Safety Issues  decreased use of arms for pushing/pulling;decreased use of legs for bridging/pushing  -RM     Bed Mobility, Impairments  ROM decreased;strength decreased;pain  -RM     Recorded by  [RM] Freddie Vargas PTA     Transfer Assessment/Treatment    Transfers, Sit-Stand [place]  verbal cues required;contact guard assist  -RM     Transfers, Stand-Sit [place]  verbal cues required;contact guard assist  -RM     Transfers, Sit-Stand-Sit, Assist Device  rolling walker  -RM     Transfer, Safety Issues  weight-shifting ability decreased;sequencing ability decreased;steps too close front assistive device  -RM     Recorded by  [RM] Freddie Vargas PTA     Gait Assessment/Treatment    Gait, [place] Level  verbal cues required;contact guard assist  -RM     Gait, Assistive Device  rolling walker  -RM     Gait, Distance (Feet)  88  -RM     Gait, Gait Pattern Analysis  swing-through gait  -RM     Gait, Gait Deviations  antalgic;left:;decreased heel strike;step length decreased;toe-to-floor clearance decreased;weight-shifting ability decreased  -RM     Recorded by  [RM] Freddie OJEDA  JEFE Vargas     Lower Body Dressing Assessment/Training    LB Dressing Assess/Train, Clothing Type doffing:;donning:;socks  -SD      LB Dressing Assess/Train, Assist Device reacher;sock-aid  -SD      LB Dressing Assess/Train, Position sitting  -SD      LB Dressing Assess/Train, [place] supervision required  -SD      Recorded by [SD] Joanna Miller OT      Therapy Exercises    Left Lower Extremity  AAROM:;10 reps;supine;ankle pumps/circles;glut sets;heel slides;hip abduction/adduction;quad sets;SAQ;SLR  -RM     Bilateral Upper Extremity AROM:;15 reps;sitting;elbow flexion/extension;shoulder abduction/adduction;shoulder extension/flexion  -SD      BUE Resistance theraband  -SD      Recorded by [SD] Joanna Miller OT [RM] Freddie Vargas PTA     Positioning and Restraints    Pre-Treatment Position sitting in chair/recliner  -SD in bed  -RM     Post Treatment Position chair  -SD chair  -RM     In Chair reclined;call light within reach;encouraged to call for assist  -SD reclined;call light within reach;encouraged to call for assist;notified nsg;legs elevated  -RM     Recorded by [SD] Joanna Miller OT [RM] Freddie Vargas PTA       User Key  (r) = Recorded By, (t) = Taken By, (c) = Cosigned By    Initials Name Effective Dates    RM Freddie Vargas PTA 10/26/16 -     SD Joanna Miller OT 06/30/17 -                 OT Goals       10/12/17 1113 10/11/17 1201       Bed Mobility OT LTG    Bed Mobility OT LTG, Date Established  10/11/17  -SD     Bed Mobility OT LTG, Time to Achieve  2 wks  -SD     Bed Mobility OT LTG, Activity Type  supine to sit/sit to supine  -SD     Bed Mobility OT LTG, [place] Level  contact guard assist  -SD     Bed Mobility OT LTG, Assist Device  bed rails  -SD     Bed Mobility OT LTG, Date Goal Reviewed 10/12/17  -SD      Bed Mobility OT LTG, Outcome goal ongoing  -SD goal ongoing  -SD     Strength OT LTG    Strength Goal OT LTG, Date Established  10/11/17  -SD     Strength Goal  OT LTG, Time to Achieve  2 wks  -SD     Strength Goal OT LTG, Measure to Achieve  patient will perform 15 reps UB ther ex using red therband in order to increase strength and endurance.   -SD     Strength Goal OT LTG, Date Goal Reviewed 10/12/17  -SD      Strength Goal OT LTG, Outcome goal met  -SD goal ongoing  -SD     Patient Education OT LTG    Patient Education OT LTG, Date Established  10/11/17  -SD     Patient Education OT LTG, Time to Achieve  2 wks  -SD     Patient Education OT LTG, Education Type  adaptive equipment mgmt  -SD     Patient Education OT LTG, Education Understanding  demonstrates adequately;verbalizes understanding  -SD     Patient Education OT LTG, Date Goal Reviewed 10/12/17  -SD      Patient Education OT LTG Outcome goal met  -SD goal ongoing  -SD     LB Dressing OT LTG    LB Dressing Goal OT LTG, Date Established  10/11/17  -SD     LB Dressing Goal OT LTG, Time to Achieve  2 wks  -SD     LB Dressing Goal OT LTG, [place] Level  supervision required  -SD     LB Dressing Goal OT LTG, Adaptive Equipment  reacher;sock-aid  -SD     LB Dressing Goal OT LTG, Date Goal Reviewed 10/12/17  -SD      LB Dressing Goal OT LTG, Outcome goal met  -SD goal ongoing  -SD     Functional Mobility OT LTG    Functional Mobility Goal OT LTG, Date Established  10/11/17  -SD     Functional Mobility Goal OT LTG, Time to Achieve  2 wks  -SD     Functional Mobility Goal OT LTG, [place] Level  contact guard  -SD     Functional Mobility Goal OT LTG, Assist Device  rolling walker  -SD     Functional Mobility Goal OT LTG, Distance to Achieve  in hallway  -SD     Functional Mobility Goal OT LTG, Additional Goal  150  -SD     Functional Mobility Goal OT LTG, Date Goal Reviewed 10/12/17  -SD      Functional Mobility Goal OT LTG, Outcome goal ongoing  -SD goal ongoing  -SD       User Key  (r) = Recorded By, (t) = Taken By, (c) = Cosigned By    Initials Name Provider Type    SD Joanna Miller, OT Occupational  Therapist          Occupational Therapy Education     Title: PT OT SLP Therapies (Active)     Topic: Occupational Therapy (Active)     Point: ADL training (Done)    Description: Instruct learner(s) on proper safety adaptation and remediation techniques during self care or transfers.   Instruct in proper use of assistive devices.    Learning Progress Summary    Learner Readiness Method Response Comment Documented by Status   Patient Acceptance E VU AE to aid in LBD including reacher and sock aid. SD 10/12/17 1116 Done    Acceptance E VU Benefits of OT and OT POC SD 10/11/17 1204 Done                      User Key     Initials Effective Dates Name Provider Type Discipline    SD 06/30/17 -  Joanna Miller OT Occupational Therapist OT                  OT Recommendation and Plan  Anticipated Discharge Disposition: home with home health  Therapy Frequency: 3-5 times/wk  Plan of Care Review  Plan Of Care Reviewed With: patient  Progress: improving  Outcome Summary/Follow up Plan: OT tx completed. Patient participated in activities to progress bed mobility, functional transfers and mobility. Continue OT POC.         Outcome Measures       10/12/17 0942 10/11/17 1032 10/11/17 1024    How much help from another person do you currently need...    Turning from your back to your side while in flat bed without using bedrails?  3  -RM     Moving from lying on back to sitting on the side of a flat bed without bedrails?  3  -RM     Moving to and from a bed to a chair (including a wheelchair)?  3  -RM     Standing up from a chair using your arms (e.g., wheelchair, bedside chair)?  3  -RM     Climbing 3-5 steps with a railing?  2  -RM     To walk in hospital room?  3  -RM     AM-PAC 6 Clicks Score  17  -RM     How much help from another is currently needed...    Putting on and taking off regular lower body clothing? 3  -SD  2  -SD    Bathing (including washing, rinsing, and drying) 2  -SD  2  -SD    Toileting (which includes using  toilet bed pan or urinal) 3  -SD  3  -SD    Putting on and taking off regular upper body clothing 4  -SD  4  -SD    Taking care of personal grooming (such as brushing teeth) 4  -SD  4  -SD    Eating meals 4  -SD  4  -SD    Score 20  -SD  19  -SD    Functional Assessment    Outcome Measure Options AM-PAC 6 Clicks Daily Activity (OT)  -SD AM-PAC 6 Clicks Basic Mobility (PT)  - AM-PAC 6 Clicks Daily Activity (OT)  -SD      10/10/17 1917          How much help from another person do you currently need...    Turning from your back to your side while in flat bed without using bedrails? 3  -JR      Moving from lying on back to sitting on the side of a flat bed without bedrails? 3  -JR      Moving to and from a bed to a chair (including a wheelchair)? 3  -JR      Standing up from a chair using your arms (e.g., wheelchair, bedside chair)? 3  -JR      Climbing 3-5 steps with a railing? 2  -JR      To walk in hospital room? 3  -JR      AM-PAC 6 Clicks Score 17  -JR      Functional Assessment    Outcome Measure Options AM-PAC 6 Clicks Basic Mobility (PT)  -JR        User Key  (r) = Recorded By, (t) = Taken By, (c) = Cosigned By    Initials Name Provider Type    JR Gale Jones, PT Physical Therapist    RM Freddie Vargas, PTA Physical Therapy Assistant    ELADIO Miller OT Occupational Therapist           Time Calculation:         Time Calculation- OT       10/12/17 1117          Time Calculation- OT    OT Start Time 0942  -SD      Total Timed Code Minutes- OT 15 minute(s)  -SD      OT Received On 10/12/17  -SD      OT Goal Re-Cert Due Date 10/21/17  -SD        User Key  (r) = Recorded By, (t) = Taken By, (c) = Cosigned By    Initials Name Provider Type    ELADIO Miller OT Occupational Therapist           Therapy Charges for Today     Code Description Service Date Service Provider Modifiers Qty    [id number]  OT EVAL LOW COMPLEXITY 4 10/11/2017 Joanna Miller OT GO 1    [id number]  OT SELF CARE/MGMT/TRAIN  EA 15 MIN 10/12/2017 Joanna Miller, OT GO 1               Joanna Miller, OT  10/12/2017

## 2017-10-12 NOTE — PLAN OF CARE
Problem: Patient Care Overview (Adult)  Goal: Plan of Care Review  Outcome: Ongoing (interventions implemented as appropriate)    10/12/17 1459   Coping/Psychosocial Response Interventions   Plan Of Care Reviewed With patient   Patient Care Overview   Progress improving   Outcome Evaluation   Outcome Summary/Follow up Plan Pt tolerated increased ther ex reps increased PROM as well as increased ambulation distance with improved gait pattern . See flowsheet for details.         Problem: Inpatient Physical Therapy  Goal: Gait Training Goal LTG- PT  Outcome: Ongoing (interventions implemented as appropriate)    10/10/17 2022 10/12/17 1459   Gait Training PT LTG   Gait Training Goal PT LTG, Date Established 10/10/17 --    Gait Training Goal PT LTG, Time to Achieve 2 wks --    Gait Training Goal PT LTG, [place] Level contact guard assist --    Gait Training Goal PT LTG, Assist Device walker, rolling --    Gait Training Goal PT LTG, Distance to Achieve 250 --    Gait Training Goal PT LTG, Outcome --  goal ongoing

## 2017-10-13 NOTE — THERAPY DISCHARGE NOTE
Acute Care - Occupational Therapy Discharge Summary  [facility]     Patient Name: Nicole Mack  : 1955  MRN: [id number]    Today's Date: 10/13/2017  Onset of Illness/Injury or Date of Surgery Date: 10/10/17    Date of Referral to OT: 10/10/17  Referring Physician: Dr. Britt      Admit Date: 10/10/2017        OT Recommendation and Plan    Visit Dx:    ICD-10-CM ICD-9-CM   1. Impaired functional mobility, balance, gait, and endurance Z74.09 V49.89   2. Primary osteoarthritis of left knee M17.12 715.16   3. Pre-op testing Z01.818 V72.84   4. Impaired mobility and ADLs Z74.09 799.89   5. S/P total knee replacement, left Z96.652 V43.65                     OT Goals       10/12/17 1113 10/11/17 1201       Bed Mobility OT LTG    Bed Mobility OT LTG, Date Established  10/11/17  -SD     Bed Mobility OT LTG, Time to Achieve  2 wks  -SD     Bed Mobility OT LTG, Activity Type  supine to sit/sit to supine  -SD     Bed Mobility OT LTG, [place] Level  contact guard assist  -SD     Bed Mobility OT LTG, Assist Device  bed rails  -SD     Bed Mobility OT LTG, Date Goal Reviewed 10/12/17  -SD      Bed Mobility OT LTG, Outcome goal ongoing  -SD goal ongoing  -SD     Strength OT LTG    Strength Goal OT LTG, Date Established  10/11/17  -SD     Strength Goal OT LTG, Time to Achieve  2 wks  -SD     Strength Goal OT LTG, Measure to Achieve  patient will perform 15 reps UB ther ex using red therband in order to increase strength and endurance.   -SD     Strength Goal OT LTG, Date Goal Reviewed 10/12/17  -SD      Strength Goal OT LTG, Outcome goal met  -SD goal ongoing  -SD     Patient Education OT LTG    Patient Education OT LTG, Date Established  10/11/17  -SD     Patient Education OT LTG, Time to Achieve  2 wks  -SD     Patient Education OT LTG, Education Type  adaptive equipment mgmt  -SD     Patient Education OT LTG, Education Understanding  demonstrates adequately;verbalizes understanding  -SD     Patient Education OT  LTG, Date Goal Reviewed 10/12/17  -SD      Patient Education OT LTG Outcome goal met  -SD goal ongoing  -SD     LB Dressing OT LTG    LB Dressing Goal OT LTG, Date Established  10/11/17  -SD     LB Dressing Goal OT LTG, Time to Achieve  2 wks  -SD     LB Dressing Goal OT LTG, [place] Level  supervision required  -SD     LB Dressing Goal OT LTG, Adaptive Equipment  reacher;sock-aid  -SD     LB Dressing Goal OT LTG, Date Goal Reviewed 10/12/17  -SD      LB Dressing Goal OT LTG, Outcome goal met  -SD goal ongoing  -SD     Functional Mobility OT LTG    Functional Mobility Goal OT LTG, Date Established  10/11/17  -SD     Functional Mobility Goal OT LTG, Time to Achieve  2 wks  -SD     Functional Mobility Goal OT LTG, [place] Level  contact guard  -SD     Functional Mobility Goal OT LTG, Assist Device  rolling walker  -SD     Functional Mobility Goal OT LTG, Distance to Achieve  in hallway  -SD     Functional Mobility Goal OT LTG, Additional Goal  150  -SD     Functional Mobility Goal OT LTG, Date Goal Reviewed 10/12/17  -SD      Functional Mobility Goal OT LTG, Outcome goal ongoing  -SD goal ongoing  -SD       User Key  (r) = Recorded By, (t) = Taken By, (c) = Cosigned By    Initials Name Provider Type    ELADIO Miller OT Occupational Therapist                Outcome Measures       10/12/17 0942 10/12/17 0824 10/11/17 1032    How much help from another person do you currently need...    Turning from your back to your side while in flat bed without using bedrails?  3  -RM 3  -RM    Moving from lying on back to sitting on the side of a flat bed without bedrails?  3  -RM 3  -RM    Moving to and from a bed to a chair (including a wheelchair)?  3  -RM 3  -RM    Standing up from a chair using your arms (e.g., wheelchair, bedside chair)?  3  -RM 3  -RM    Climbing 3-5 steps with a railing?  3  -RM 2  -RM    To walk in hospital room?  3  -RM 3  -RM    AM-PAC 6 Clicks Score  18  -RM 17  -RM    How much help from  another is currently needed...    Putting on and taking off regular lower body clothing? 3  -SD      Bathing (including washing, rinsing, and drying) 2  -SD      Toileting (which includes using toilet bed pan or urinal) 3  -SD      Putting on and taking off regular upper body clothing 4  -SD      Taking care of personal grooming (such as brushing teeth) 4  -SD      Eating meals 4  -SD      Score 20  -SD      Functional Assessment    Outcome Measure Options AM-PAC 6 Clicks Daily Activity (OT)  -SD AM-PAC 6 Clicks Basic Mobility (PT)  -RM AM-PAC 6 Clicks Basic Mobility (PT)  -RM      10/11/17 1024 10/10/17 1917       How much help from another person do you currently need...    Turning from your back to your side while in flat bed without using bedrails?  3  -JR     Moving from lying on back to sitting on the side of a flat bed without bedrails?  3  -JR     Moving to and from a bed to a chair (including a wheelchair)?  3  -JR     Standing up from a chair using your arms (e.g., wheelchair, bedside chair)?  3  -JR     Climbing 3-5 steps with a railing?  2  -JR     To walk in hospital room?  3  -JR     AM-PAC 6 Clicks Score  17  -JR     How much help from another is currently needed...    Putting on and taking off regular lower body clothing? 2  -SD      Bathing (including washing, rinsing, and drying) 2  -SD      Toileting (which includes using toilet bed pan or urinal) 3  -SD      Putting on and taking off regular upper body clothing 4  -SD      Taking care of personal grooming (such as brushing teeth) 4  -SD      Eating meals 4  -SD      Score 19  -SD      Functional Assessment    Outcome Measure Options AM-PAC 6 Clicks Daily Activity (OT)  -SD AM-PAC 6 Clicks Basic Mobility (PT)  -JR       User Key  (r) = Recorded By, (t) = Taken By, (c) = Cosigned By    Initials Name Provider Type    JR Gale Jones, PT Physical Therapist    RM Freddie Vargas, PTA Physical Therapy Assistant    ELADIO Miller, OT Occupational  Therapist              OT Discharge Summary  Anticipated Discharge Disposition: home with home health  Reason for Discharge: Discharge from facility  Outcomes Achieved: Patient able to partially acheive established goals  Discharge Destination: Home with home health      Archana Guerra  10/13/2017

## 2017-10-13 NOTE — DISCHARGE SUMMARY
Name:  Nicole Mack   Age:  61 y.o.  Sex:  female  :  1955  MRN:  [id number]   Visit Number:  [id number]  Primary Care Physician:  DANIEL Dela Cruz  Date of Discharge:  10-12-17  Admission Date:  10/10/2017 11:33 AM      Discharge Diagnosis:       Patient Active Problem List   Diagnosis   • Concentration deficit   • Microangiopathy   • Migraine without aura and without status migrainosus, not intractable   • B12 deficiency   • Pre-op testing   • Primary osteoarthritis of left knee   • OA (osteoarthritis) of knee       Presenting Problem/History of Present Illness:    Pre-op testing [Z01.818]  Primary osteoarthritis of left knee [M17.12]  OA (osteoarthritis) of knee [M17.10]         Consults:     Consults     Date and Time Order Name Status Description    10/10/2017 1807 Inpatient Consult to Hospitalist Completed           Procedures Performed:    Procedure(s):  ELECTIVE LEFT TOTAL KNEE ARTHROPLASTY (S&N)         History of presenting illness:    Patient is status post day #2 for an elective left total knee replacement secondary to osteoarthritis.  Patient had tried numerous conservative measures to treat her left knee osteoarthritis without success.  Patient denies chest pain or shortness of breath.  She states her pain is well controlled but the swelling to the knee bothers her.      Vital Signs:    Temp:  [98.3 °F (36.8 °C)] 98.3 °F (36.8 °C)  Heart Rate:  [97] 97  Resp:  [16] 16  BP: (131)/(57) 131/57    Physical Exam:    General Appearance: alert, appears stated age and cooperative  Eyes: conjunctivae and sclerae normal  Nose: nares normal  Throat: oral mucosa moist  Lungs: respirations regular and respirations even  Heart: regular rhythm & normal rate  Abdomen: soft non-tender, no guarding and no rebound tenderness  Extremities: moves extremities well, no redness, no tenderness, Joshua's sign negative, no ischemic changes and no ulcers  Pulses: Pulses palpable and equal bilaterally  Skin:  turgor normal  Neurologic: Mental Status orientated to person, place, time and situation  Psych: normal    Left knee incision site is clean, dry and pristine    Pertinent Lab Results:     Lab Results (all)     Procedure Component Value Units Date/Time    HIV-1 / O / 2 Ag / Antibody 4th Generation [[id number]] Collected:  10/10/17 1755    Specimen:  Blood Updated:  10/10/17 1759    CBC & Differential [[id number]] Collected:  10/11/17 0650    Specimen:  Blood Updated:  10/11/17 0719    Narrative:       The following orders were created for panel order CBC & Differential.  Procedure                               Abnormality         Status                     ---------                               -----------         ------                     CBC Auto Differential[[id number]]        Abnormal            Final result                 Please view results for these tests on the individual orders.    CBC Auto Differential [[id number]]  (Abnormal) Collected:  10/11/17 0650    Specimen:  Blood Updated:  10/11/17 0719     WBC 9.79 10*3/mm3      RBC 3.89 (L) 10*6/mm3      Hemoglobin 11.5 (L) g/dL      Hematocrit 35.8 (L) %      MCV 92.0 fL      MCH 29.6 pg      MCHC 32.1 g/dL      RDW 14.4 %      RDW-SD 48.6 fl      MPV 11.0 fL      Platelets 280 10*3/mm3      Neutrophil % 68.4 %      Lymphocyte % 20.6 %      Monocyte % 10.5 %      Eosinophil % 0.0 %      Basophil % 0.3 %      Immature Grans % 0.2 %      Neutrophils, Absolute 6.69 10*3/mm3      Lymphocytes, Absolute 2.02 10*3/mm3      Monocytes, Absolute 1.03 (H) 10*3/mm3      Eosinophils, Absolute 0.00 10*3/mm3      Basophils, Absolute 0.03 10*3/mm3      Immature Grans, Absolute 0.02 10*3/mm3      nRBC 0.0 /100 WBC     Basic Metabolic Panel [[id number]]  (Abnormal) Collected:  10/11/17 0650    Specimen:  Blood Updated:  10/11/17 0726     Glucose 106 (H) mg/dL      BUN 7 mg/dL      Creatinine 0.50 (L) mg/dL      Sodium 144 mmol/L      Potassium 3.6 mmol/L      Chloride 109 (H)  mmol/L      CO2 25.0 (L) mmol/L      Calcium 8.7 mg/dL      eGFR Non African Amer 125 mL/min/1.73      BUN/Creatinine Ratio 14.0     Anion Gap 13.6 mmol/L     Narrative:       Abnormal estimated GFR should be followed by more specific studies to confirm end stage chronic renal disease. The equation used for calculation may not be accurate for patients less than 19 years old, greater than 70 years old, patients at extremes of weight, malnutrition, or with acute renal dysfunction.    Tissue Pathology Exam - Bone, Knee, Left [[id number]] Collected:  10/10/17 1440    Specimen:  Bone from Knee, Left Updated:  10/11/17 0906    Hepatitis B Surface Antigen [[id number]] Collected:  10/10/17 1755    Specimen:  Blood Updated:  10/12/17 0823     Hepatitis B Surface Ag Negative    Narrative:       Performed at:  01 - [facility] [address]  [id number]  : Francisco J Lea PhD, Phone:  [phone]    Needle Stick Pt Source [[id number]] Collected:  10/10/17 1755    Specimen:  Blood Updated:  10/12/17 2307    Narrative:       The following orders were created for panel order Needle Stick Pt Source.  Procedure                               Abnormality         Status                     ---------                               -----------         ------                     HCV RNA, PCR, Qualitative[[id number]]                        Final result               Hepatitis B Surface Antigen[[id number]]                      Final result               HIV-1 / O / 2 Ag / Antib...[[id number]]                      In process                   Please view results for these tests on the individual orders.    HCV RNA, PCR, Qualitative [[id number]] Collected:  10/10/17 1755    Specimen:  Blood Updated:  10/12/17 2307     Hepatitis C RNA-LORRIE Negative      Negative: HCV RNA Not Detected       Narrative:       Performed at:  01 - [facility] [address]  [id number]  : Julio César Crowley  MD, Phone:  [phone]          Pertinent Radiology Results:    Imaging Results (all)     Procedure Component Value Units Date/Time    XR Knee 1 or 2 View Left [[id number]] Collected:  10/10/17 1644     Updated:  10/10/17 1647    Narrative:       PROCEDURE: XR KNEE 1 OR 2 VW LEFT-     HISTORY: Post-op L TKR; M17.12-Unilateral primary osteoarthritis, left  knee; Z01.818-Encounter for other preprocedural examination     COMPARISON: None.     FINDINGS:  A 2 view exam demonstrates total left knee arthroplasty.   There are no hardware complications.  The expected postoperative fluid  and gas is seen in the joint.           Impression:       Status post total left knee arthroplasty with no hardware  complications.                 This report was finalized on 10/10/2017 4:44 PM by Dayan Schaffer M.D..          Condition on Discharge:      stable      Discharge Disposition:    Home or Self Care    Discharge Medication:     Nicole Mack   Home Medication Instructions BOOM:[id number]    Printed on:10/13/17 0814   Medication Information                      alendronate (FOSAMAX) 70 MG tablet  Take 70 mg by mouth Every 7 (Seven) Days.             aspirin 325 MG EC tablet  Take 325 mg by mouth Daily.             atorvastatin (LIPITOR) 10 MG tablet  Take 10 mg by mouth Daily.             cetirizine (zyrTEC) 10 MG tablet  Take 10 mg by mouth Daily.             cyanocobalamin 1000 MCG/ML injection  Inject 1,000 mcg into the shoulder, thigh, or buttocks Every 28 (Twenty-Eight) Days.             enoxaparin (LOVENOX) 30 MG/0.3ML solution syringe  Inject contents of 1 (one) syringe under the skin Every 12 (Twelve) Hours.             HYDROcodone-acetaminophen (NORCO) 7.5-325 MG per tablet  Take 1 tablet by mouth Every 6 (Six) Hours As Needed (for pain).             levothyroxine (SYNTHROID, LEVOTHROID) 75 MCG tablet  Take 75 mcg by mouth Daily.             omeprazole (priLOSEC) 20 MG capsule  Take 20 mg by mouth Every  Morning.                 Discharge Diet:     Diet Instructions     Advance Diet As Tolerated                     Activity at Discharge:     Activity Instructions     Discharge Activity Restrictions       No driving until cleared by orthopedics.  Weight bearing as tolerated with a walker.  Continue wearing TOMMIE hose until follow-up appointment.  Continue using incentive spirometer until follow-up appointment.                 Follow-up Appointments:    Future Appointments  Date Time Provider Department Center   10/23/2017 11:00 AM Jerrell Britt MD [facility] None   7/25/2018 3:00 PM Noah Wolfe MD [facility] None     Additional Instructions for the Follow-ups that You Need to Schedule     Ambulatory Referral to Physical Therapy Evaluate and treat, Ortho    As directed    Total knee replacement protocol.   Specialty modality needed?:   Evaluate and treat  Ortho                    Test Results Pending at Discharge:     Order Current Status    HIV-1 / O / 2 Ag / Antibody 4th Generation In process    Needle Stick Pt Source In process    Tissue Pathology Exam - Bone, Knee, Left In process          Assessment and plan:  Status post left total knee replacement:    Patient states she already has all required durable medical equipment at home.  She is advised to wear the TOMMIE hose as well as Lovenox injections for DVT prophylaxis.  Per the  there are no home health agencies to come to the patient's home.  The patient prefers to visit an outpatient therapy     Carlo Hutson PA-C  10/13/17  8:14 AM    Time: Discharge 20 min

## 2017-10-16 LAB
LAB AP CASE REPORT: NORMAL
Lab: NORMAL
PATH REPORT.FINAL DX SPEC: NORMAL

## 2017-10-23 ENCOUNTER — OFFICE VISIT (OUTPATIENT)
Dept: ORTHOPEDIC SURGERY | Facility: CLINIC | Age: 62
End: 2017-10-23

## 2017-10-23 VITALS — BODY MASS INDEX: 27.92 KG/M2 | HEIGHT: 58 IN | RESPIRATION RATE: 16 BRPM | WEIGHT: 133 LBS

## 2017-10-23 DIAGNOSIS — Z96.652 STATUS POST TOTAL KNEE REPLACEMENT USING CEMENT, LEFT: Primary | ICD-10-CM

## 2017-10-23 DIAGNOSIS — Z96.651 HISTORY OF TOTAL KNEE REPLACEMENT, RIGHT: ICD-10-CM

## 2017-10-23 PROCEDURE — 99024 POSTOP FOLLOW-UP VISIT: CPT | Performed by: ORTHOPAEDIC SURGERY

## 2017-10-23 RX ORDER — MELOXICAM 15 MG/1
TABLET ORAL
COMMUNITY
Start: 2017-10-13 | End: 2018-01-22

## 2017-10-23 RX ORDER — HYDROCODONE BITARTRATE AND ACETAMINOPHEN 7.5; 325 MG/1; MG/1
1 TABLET ORAL EVERY 8 HOURS PRN
Qty: 30 TABLET | Refills: 0 | Status: SHIPPED | OUTPATIENT
Start: 2017-10-23 | End: 2017-12-11 | Stop reason: SDUPTHER

## 2017-10-23 NOTE — PROGRESS NOTES
Subjective   Patient ID: Nicole Mack is a 61 y.o. right hand dominant female is here today for a post-operative visit  Post-op of the Left Knee        History of Present Illness     Pain controlled: [] no   [x] yes   Medication refill requested: [] no   [x] yes    Patient compliant with instructions: [] no   [x] yes   Other: She presents with her .  She is making good early progress after left total knee replacement.   She has had some soft swelling and soreness of the left knee though her pain is well controlled.  She is ambulating in a stable manner with a walker.  No respiratory, abdominal or other issues or concerns. She has a history of right total knee replacement a couple of years ago with continued excellent recovery.      Past Medical History:   Diagnosis Date   • Arthritis of back    • Disease of thyroid gland    • Fracture     LEFT PINKY TOE AT PRESENT TIME, RIGHT ANKLE WHEN A TEENAGER   • GERD (gastroesophageal reflux disease)    • H/O seasonal allergies    • High cholesterol    • [place] (hard of hearing)     REPORTS NO HEARING AIDS   • Memory loss     REPORTS WAS RECENTLY DIAGNOSED BY PCP WITH HAVE MILD MEMORY LOSS, EARLY ONSET OF DEMENTIA   • Osteoarthritis    • Stroke syndrome     REPORTS CVA IN 2002. REPORTS WEAKNESS IN EXTREMITIES FROM THIS.    • Valgus deformity, not elsewhere classified, right knee    • Wears glasses    • Wears partial dentures     UPPER PLATE        Past Surgical History:   Procedure Laterality Date   • BREAST CYST EXCISION Left     REPORTS EARLINE, REPORTS NO RESTRICTIONS ON LUE   • CATARACT EXTRACTION W/ INTRAOCULAR LENS IMPLANT Left 7/27/2017    Procedure: CATARACT PHACO EXTRACTION WITH INTRAOCULAR LENS IMPLANT LEFT;  Surgeon: Jw Mansfield MD;  Location: [facility];  Service:    • CATARACT EXTRACTION W/ INTRAOCULAR LENS IMPLANT Right 8/17/2017    Procedure: CATARACT PHACO EXTRACTION WITH INTRAOCULAR LENS IMPLANT RIGHT;  Surgeon: Jw Mansfield MD;  Location: [facility];   "Service:    • MOUTH SURGERY      ORAL EXTRACTIONS   • OOPHORECTOMY Left    • TOTAL KNEE ARTHROPLASTY Right 02/09/2016    KIRA Britt MD   • TOTAL KNEE ARTHROPLASTY Left 10/10/2017    KIRA Britt MD   • TUBAL ABDOMINAL LIGATION       Family History   Problem Relation Age of Onset   • Diabetes Father    • Stroke Other      Social History     Social History   • Marital status:      Spouse name: N/A   • Number of children: N/A   • Years of education: N/A     Social History Main Topics   • Smoking status: Current Every Day Smoker     Packs/day: 1.00     Years: 42.00     Types: Cigarettes   • Smokeless tobacco: Never Used   • Alcohol use No   • Drug use: No   • Sexual activity: Defer     Other Topics Concern   • None     Social History Narrative       Allergies   Allergen Reactions   • Bee Venom Swelling       Review of Systems   Constitutional: Negative for fever.   HENT: Negative for voice change.    Eyes: Negative for visual disturbance.   Respiratory: Negative for shortness of breath.    Cardiovascular: Negative for chest pain.   Gastrointestinal: Negative for abdominal distention and abdominal pain.   Genitourinary: Negative for dysuria.   Musculoskeletal: Positive for arthralgias. Negative for gait problem and joint swelling.   Skin: Negative for rash.   Neurological: Negative for speech difficulty.   Hematological: Does not bruise/bleed easily.   Psychiatric/Behavioral: Negative for confusion.       Objective   Resp 16  Ht 58\" (147.3 cm)  Wt 133 lb (60.3 kg)  LMP  (LMP Unknown) Comment: POSTMENOPAUSAL  BMI 27.8 kg/m2      Signs of infection: [x] no                    [] yes   Drainage: [x] no                    [] yes   Incision: [x] healing well     []healed well   Motor exam intact: [] no                    [x] yes   Neurovascular exam intact: [] no                    [x] yes   Signs of compartment syndrome: [x] no                    [] yes   Signs of DVT: [x] no                    [] yes   Other:  "     Physical Exam   Constitutional: She appears well-developed. No distress.   Musculoskeletal:        Left knee: She exhibits no effusion.   Neurological: She is alert.   Skin: Skin is warm and dry. No rash noted. No erythema.   Psychiatric: She has a normal mood and affect. Her speech is normal.   Vitals reviewed.    Left Knee Exam     Tenderness   Left knee tenderness location: anterior soreness.    Range of Motion   Extension: 0   Flexion: 90     Tests   Varus: negative  Valgus: negative  Patellar Apprehension: negative    Other   Erythema: absent  Scars: present (healed well)  Pulse: present  Swelling: mild  Effusion: no effusion present        Extremity DVT signs are Negative on physical exam with negative Joshua sign, with no calf pain, with no palpable cords and with no skin tone change  Neurologic Exam     Mental Status   Attention: normal.   Speech: speech is normal   Level of consciousness: alert  Knowledge: good.     Motor Exam   Overall muscle tone: normal    Gait, Coordination, and Reflexes     Gait  Gait: (stable walker ambulation with good gait pace and rhythm)    Ortho Exam    Assessment/Plan   Independent Review of Radiographic Studies:    No new imaging done today.  Laboratory and Other Studies:  No new results reviewed today.   Medical Decision Making:    No complications of procedure and treatments.  Stable post-operative exam and expected early progress.     Procedures  [x] No procedures were performed in office today.     Nicole was seen today for post-op.    Diagnoses and all orders for this visit:    Status post total knee replacement using cement, left    History of total knee replacement, right    Other orders  -     HYDROcodone-acetaminophen (NORCO) 7.5-325 MG per tablet; Take 1 tablet by mouth Every 8 (Eight) Hours As Needed (for pain).         Recommendations/Plan:     Dermabond [x] Removed today  [] At prior visit  [] Plan removal later   Splint/cast applied: [x]no []SAC []LAC []SLC  []LLC []PTB []Splint:    Brace: [x]not provided        []pt provided with:   Physical therapy: []not at this time    [x]home-based    []outpatient referral   Ultrasound: [x]not ordered         []order given to patient   Labs: [x]not ordered         []order given to patient   Weight Bearing status: []Full [x]WBAT []PWB []NWB []Other   Prescriptions: []None given today  [x]As Noted   Imaging at next appt: []Yes  [x]No          Additional instructions: Patient agreeable to return sooner for any new concern.     Regular exercise as tolerated  Orthopedic activities reviewed and patient expressed appreciation  Discussion of orthopedic goals  Take prescribed medications as instructed only as tolerated  Ice, heat, and/or modalities as beneficial  After care and dental prophylaxis for joint replacement prosthesis  Guided on proper techniques for mobility, strength, agility and/or conditioning exercises      Exercise, medications, injections, other patient advice, and return appointment as noted.  Brace: No brace was given at today's visit  Referral: No referrals made at today's visit  Test/Studies: No additional studies ordered.  Surgery: No surgery proposed at this visit.  Work/Activity Status: May perform usual activities as tolerated  Patient is encouraged to call or return for any issues or concerns.    Return in about 6 weeks (around 12/4/2017) for Recheck.  Patient agreeable to call or return sooner for any concerns.      Scribed for Jerrell Britt MD by Trista Wells R.N.. 10/23/2017  5:58 PM

## 2017-12-11 ENCOUNTER — OFFICE VISIT (OUTPATIENT)
Dept: ORTHOPEDIC SURGERY | Facility: CLINIC | Age: 62
End: 2017-12-11

## 2017-12-11 ENCOUNTER — APPOINTMENT (OUTPATIENT)
Dept: LAB | Facility: HOSPITAL | Age: 62
End: 2017-12-11
Attending: ORTHOPAEDIC SURGERY

## 2017-12-11 VITALS — WEIGHT: 133 LBS | BODY MASS INDEX: 27.92 KG/M2 | HEIGHT: 58 IN | RESPIRATION RATE: 18 BRPM

## 2017-12-11 DIAGNOSIS — M25.462 SWELLING OF KNEE JOINT, LEFT: Primary | ICD-10-CM

## 2017-12-11 DIAGNOSIS — Z96.651 HISTORY OF TOTAL KNEE REPLACEMENT, RIGHT: ICD-10-CM

## 2017-12-11 DIAGNOSIS — Z96.652 STATUS POST TOTAL KNEE REPLACEMENT USING CEMENT, LEFT: ICD-10-CM

## 2017-12-11 LAB
ANISOCYTOSIS BLD QL: ABNORMAL
CRP SERPL-MCNC: <0.5 MG/DL (ref 0–1)
DEPRECATED RDW RBC AUTO: 47.3 FL (ref 37–54)
EOSINOPHIL # BLD MANUAL: 0.4 10*3/MM3 (ref 0–0.7)
EOSINOPHIL NFR BLD MANUAL: 4 % (ref 0–7)
ERYTHROCYTE [DISTWIDTH] IN BLOOD BY AUTOMATED COUNT: 14.2 % (ref 11.5–14.5)
ERYTHROCYTE [SEDIMENTATION RATE] IN BLOOD: 40 MM/HR (ref 0–20)
HCT VFR BLD AUTO: 45.4 % (ref 37–47)
HGB BLD-MCNC: 14.1 G/DL (ref 12–16)
HYPOCHROMIA BLD QL: ABNORMAL
LARGE PLATELETS: ABNORMAL
LYMPHOCYTES # BLD MANUAL: 2.6 10*3/MM3 (ref 0.6–3.4)
LYMPHOCYTES NFR BLD MANUAL: 26 % (ref 10–50)
LYMPHOCYTES NFR BLD MANUAL: 7 % (ref 0–12)
MCH RBC QN AUTO: 28.2 PG (ref 27–31)
MCHC RBC AUTO-ENTMCNC: 31.1 G/DL (ref 30–37)
MCV RBC AUTO: 90.8 FL (ref 81–99)
MONOCYTES # BLD AUTO: 0.7 10*3/MM3 (ref 0–0.9)
NEUTROPHILS # BLD AUTO: 5.89 10*3/MM3 (ref 2–6.9)
NEUTROPHILS NFR BLD MANUAL: 54 % (ref 37–80)
NEUTS BAND NFR BLD MANUAL: 5 % (ref 0–6)
PLATELET # BLD AUTO: 535 10*3/MM3 (ref 130–400)
PMV BLD AUTO: 9.8 FL (ref 6–12)
POIKILOCYTOSIS BLD QL SMEAR: ABNORMAL
RBC # BLD AUTO: 5 10*6/MM3 (ref 4.2–5.4)
SMALL PLATELETS BLD QL SMEAR: ABNORMAL
VARIANT LYMPHS NFR BLD MANUAL: 4 % (ref 0–0)
WBC MORPH BLD: NORMAL
WBC NRBC COR # BLD: 9.99 10*3/MM3 (ref 4.8–10.8)

## 2017-12-11 PROCEDURE — 86140 C-REACTIVE PROTEIN: CPT | Performed by: ORTHOPAEDIC SURGERY

## 2017-12-11 PROCEDURE — 85651 RBC SED RATE NONAUTOMATED: CPT | Performed by: ORTHOPAEDIC SURGERY

## 2017-12-11 PROCEDURE — 36415 COLL VENOUS BLD VENIPUNCTURE: CPT | Performed by: ORTHOPAEDIC SURGERY

## 2017-12-11 PROCEDURE — 99024 POSTOP FOLLOW-UP VISIT: CPT | Performed by: ORTHOPAEDIC SURGERY

## 2017-12-11 PROCEDURE — 85007 BL SMEAR W/DIFF WBC COUNT: CPT | Performed by: ORTHOPAEDIC SURGERY

## 2017-12-11 PROCEDURE — 85027 COMPLETE CBC AUTOMATED: CPT | Performed by: ORTHOPAEDIC SURGERY

## 2017-12-11 RX ORDER — ATORVASTATIN CALCIUM 20 MG/1
TABLET, FILM COATED ORAL
Refills: 2 | COMMUNITY
Start: 2017-11-27 | End: 2019-11-20

## 2017-12-11 RX ORDER — HYDROCODONE BITARTRATE AND ACETAMINOPHEN 7.5; 325 MG/1; MG/1
1 TABLET ORAL 2 TIMES DAILY PRN
Qty: 30 TABLET | Refills: 0 | Status: SHIPPED | OUTPATIENT
Start: 2017-12-11 | End: 2019-11-20

## 2017-12-11 NOTE — PROGRESS NOTES
Subjective   Patient ID: Nicole Mack is a 62 y.o. right hand dominant female is here today for a post-operative visit  Post-op of the Left Knee        History of Present Illness     Pain controlled: [x] no   [] yes   Medication refill requested: [] no   [x] yes    Patient compliant with instructions: [] no   [x] yes   Other: Patient notes she has some continued swelling and stiffness in her knee especially after being on it for extended periods of time. Patient notes limiited range of motion, strength and ambulation early post-op.     Past Medical History:   Diagnosis Date   • Arthritis of back    • Disease of thyroid gland    • Fracture     LEFT PINKY TOE AT PRESENT TIME, RIGHT ANKLE WHEN A TEENAGER   • GERD (gastroesophageal reflux disease)    • H/O seasonal allergies    • High cholesterol    • [place] (hard of hearing)     REPORTS NO HEARING AIDS   • Memory loss     REPORTS WAS RECENTLY DIAGNOSED BY PCP WITH HAVE MILD MEMORY LOSS, EARLY ONSET OF DEMENTIA   • Osteoarthritis    • Stroke syndrome     REPORTS CVA IN 2002. REPORTS WEAKNESS IN EXTREMITIES FROM THIS.    • Valgus deformity, not elsewhere classified, right knee    • Wears glasses    • Wears partial dentures     UPPER PLATE        Past Surgical History:   Procedure Laterality Date   • BREAST CYST EXCISION Left     REPORTS EARLINE, REPORTS NO RESTRICTIONS ON LUE   • CATARACT EXTRACTION W/ INTRAOCULAR LENS IMPLANT Left 7/27/2017    Procedure: CATARACT PHACO EXTRACTION WITH INTRAOCULAR LENS IMPLANT LEFT;  Surgeon: Jw Mansfield MD;  Location: [facility] OR;  Service:    • CATARACT EXTRACTION W/ INTRAOCULAR LENS IMPLANT Right 8/17/2017    Procedure: CATARACT PHACO EXTRACTION WITH INTRAOCULAR LENS IMPLANT RIGHT;  Surgeon: Jw Mansfield MD;  Location: [facility] OR;  Service:    • MOUTH SURGERY      ORAL EXTRACTIONS   • OOPHORECTOMY Left    • [place] TOTAL KNEE ARTHROPLASTY Left 10/10/2017    Procedure: ELECTIVE LEFT TOTAL KNEE ARTHROPLASTY (S&N);  Surgeon: Jerrell Britt,  "MD;  Location: [facility] OR;  Service: Orthopedics   • TOTAL KNEE ARTHROPLASTY Right 02/09/2016    KIRA Britt MD   • TOTAL KNEE ARTHROPLASTY Left 10/10/2017    KIRA Britt MD   • TUBAL ABDOMINAL LIGATION         Allergies   Allergen Reactions   • Bee Venom Swelling       Review of Systems   Constitutional: Negative for fever.   HENT: Negative for voice change.    Eyes: Negative for visual disturbance.   Respiratory: Negative for shortness of breath.    Cardiovascular: Negative for chest pain.   Gastrointestinal: Negative for abdominal distention and abdominal pain.   Genitourinary: Negative for dysuria.   Musculoskeletal: Positive for arthralgias and joint swelling. Negative for gait problem.   Skin: Negative for rash.   Neurological: Negative for speech difficulty.   Hematological: Does not bruise/bleed easily.   Psychiatric/Behavioral: Negative for confusion.       Objective   Resp 18  Ht 147.3 cm (58\")  Wt 60.3 kg (133 lb)  LMP  (LMP Unknown) Comment: POSTMENOPAUSAL  BMI 27.8 kg/m2      Signs of infection: [x] no                    [] yes   Drainage: [x] no                    [] yes   Incision: [x] healing well     []healed well   Motor exam intact: [] no                    [x] yes   Neurovascular exam intact: [] no                    [x] yes   Signs of compartment syndrome: [x] no                    [] yes   Signs of DVT: [x] no                    [] yes   Other: Mild persistent warmth and soft moderate swelling of left knee that is a concern.  Recommended lab test evaluation and if abnormalities, consideration of joint aspiration.     Physical Exam   Constitutional: She appears well-developed. No distress.   Musculoskeletal: She exhibits no deformity.        Left knee: Effusion: 1+   Neurological: She is alert.   Skin: Skin is warm and dry. No rash noted. No erythema.   Psychiatric: She has a normal mood and affect. Her speech is normal.   Vitals reviewed.    Left Knee Exam     Tenderness   Left knee " tenderness location: anterior soreness.    Range of Motion   Extension: -5   Flexion: 100     Tests   Varus: negative  Valgus: negative  Patellar Apprehension: negative    Other   Erythema: absent (though some warmth and swelling persists.)  Scars: present (well healed)  Pulse: present  Swelling: moderate  Effusion: 1+        Extremity DVT signs are Negative on physical exam with negative Joshua sign, with no calf pain, with no palpable cords, with no increased pain with passive stretch/extension and with no skin tone change  Neurologic Exam     Mental Status   Attention: normal.   Speech: speech is normal   Level of consciousness: alert  Knowledge: good.     Motor Exam   Overall muscle tone: normal    Gait, Coordination, and Reflexes     Gait  Gait: (mild residual left knee limp though with stable gait pattern.)    Ortho Exam    Assessment/Plan   Independent Review of Radiographic Studies:    No new imaging done today.  Laboratory and Other Studies:  No new results reviewed today.   Medical Decision Making:    Fair progress though ongoing with residual symptoms.  Continue current management and any additional treatments and workup as outlined in plan.     Procedures  [x] No procedures were performed in office today.     Nicole was seen today for post-op.    Diagnoses and all orders for this visit:    Swelling of knee joint, left  -     Sedimentation Rate  -     C-reactive Protein  -     CBC With Manual Differential  -     CBC Auto Differential  -     Manual Differential    Status post total knee replacement using cement, left    History of total knee replacement, right    Other orders  -     HYDROcodone-acetaminophen (NORCO) 7.5-325 MG per tablet; Take 1 tablet by mouth 2 (Two) Times a Day As Needed (for pain).       Recommendations/Plan:     Sutures Staples or Pins [] Removed today  [x] At prior visit  [] Plan removal later   Splint/cast applied: [x]no []SAC []LAC []SLC []LLC []PTB []Splint:    Brace: [x]not  provided        []pt provided with:   Physical therapy: []not at this time    []home-based    [x]outpatient referral   Ultrasound: [x]not ordered         []order given to patient   Labs: [x]not ordered         []order given to patient   Weight Bearing status: []Full [x]WBAT []PWB []NWB []Other   Prescriptions: []None given today  [x]As Noted   Imaging at next appt: []Yes  [x]No        Additional instructions: Patient agreeable to return sooner for any new concern.     Regular exercise as tolerated  Orthopedic activities reviewed and patient expressed appreciation  Discussion of orthopedic goals  Ice, heat, and/or modalities as beneficial  After care and dental prophylaxis for joint replacement prosthesis  Guided on proper techniques for mobility, strength, agility and/or conditioning exercises      Exercise, medications, injections, other patient advice, and return appointment as noted.  Brace: No brace was given at today's visit  Referral: No referrals made at today's visit  Test/Studies: Labs CBC/diff, ESR and CRP  Surgery: No surgery proposed at this visit.  Work/Activity Status: May perform usual activities as tolerated and No strenuous activity.  Patient is encouraged to call or return for any issues or concerns.    Return in about 4 weeks (around 1/8/2018) for Recheck.  Patient agreeable to call or return sooner for any concerns.      Scribed for Jerrell Britt MD by Trista Wells R.N.. 12/18/2017  8:11 AM

## 2018-01-22 ENCOUNTER — OFFICE VISIT (OUTPATIENT)
Dept: ORTHOPEDIC SURGERY | Facility: CLINIC | Age: 63
End: 2018-01-22

## 2018-01-22 VITALS — BODY MASS INDEX: 28.34 KG/M2 | WEIGHT: 135 LBS | HEIGHT: 58 IN | RESPIRATION RATE: 18 BRPM

## 2018-01-22 DIAGNOSIS — Z96.652 STATUS POST TOTAL KNEE REPLACEMENT USING CEMENT, LEFT: Primary | ICD-10-CM

## 2018-01-22 DIAGNOSIS — Z09 FOLLOW-UP EXAM: ICD-10-CM

## 2018-01-22 PROCEDURE — 99213 OFFICE O/P EST LOW 20 MIN: CPT | Performed by: ORTHOPAEDIC SURGERY

## 2018-01-22 NOTE — PROGRESS NOTES
Subjective   Patient ID: Nicole Mack is a 62 y.o. right hand dominant female is here today for orthopaedic evaluation of recovery progress with treatment. and is here today for a treatment planning discussion.  Follow-up of the Left Knee       History of Present Illness    Progress Note:  Patient reports that with treatments and since the last visit, overall pain is resolved, strength is improved, and range of motion is improving.  Patient is currently using medication (Lortab) only at night.  She states she no longer requires additional prescription pain medication and has transitioned to over the counter medication.   Physical therapy has been helpful.  Injection therapy has not recently been given.. Patient does  present with recent labs that were reviewed with her and are stable.      Patient notes she is working on walking heel to toe. Patient has small pinpoint red jw on knee that is clean and dry with no erythema, no warmth or drainage.    Past Medical History:   Diagnosis Date   • Arthritis of back    • Disease of thyroid gland    • Fracture     LEFT PINKY TOE AT PRESENT TIME, RIGHT ANKLE WHEN A TEENAGER   • GERD (gastroesophageal reflux disease)    • H/O seasonal allergies    • High cholesterol    • [place] (hard of hearing)     REPORTS NO HEARING AIDS   • Memory loss     REPORTS WAS RECENTLY DIAGNOSED BY PCP WITH HAVE MILD MEMORY LOSS, EARLY ONSET OF DEMENTIA   • Osteoarthritis    • Stroke syndrome     REPORTS CVA IN 2002. REPORTS WEAKNESS IN EXTREMITIES FROM THIS.    • Valgus deformity, not elsewhere classified, right knee    • Wears glasses    • Wears partial dentures     UPPER PLATE        Past Surgical History:   Procedure Laterality Date   • BREAST CYST EXCISION Left     REPORTS EARLINE, REPORTS NO RESTRICTIONS ON LUE   • CATARACT EXTRACTION W/ INTRAOCULAR LENS IMPLANT Left 7/27/2017    Procedure: CATARACT PHACO EXTRACTION WITH INTRAOCULAR LENS IMPLANT LEFT;  Surgeon: Jw Mansfield MD;  Location:   "[facility] OR;  Service:    • CATARACT EXTRACTION W/ INTRAOCULAR LENS IMPLANT Right 8/17/2017    Procedure: CATARACT PHACO EXTRACTION WITH INTRAOCULAR LENS IMPLANT RIGHT;  Surgeon: Jw Mansfield MD;  Location: [facility] OR;  Service:    • MOUTH SURGERY      ORAL EXTRACTIONS   • OOPHORECTOMY Left    • [place] TOTAL KNEE ARTHROPLASTY Left 10/10/2017    Procedure: ELECTIVE LEFT TOTAL KNEE ARTHROPLASTY (S&N);  Surgeon: Jerrell Britt MD;  Location: [facility] OR;  Service: Orthopedics   • TOTAL KNEE ARTHROPLASTY Right 02/09/2016    KIRA Britt MD   • TOTAL KNEE ARTHROPLASTY Left 10/10/2017    KIRA Britt MD   • TUBAL ABDOMINAL LIGATION          Family History   Problem Relation Age of Onset   • Diabetes Father    • Stroke Other         Social History     Social History   • Marital status:      Spouse name: N/A   • Number of children: N/A   • Years of education: N/A     Occupational History   • Not on file.     Social History Main Topics   • Smoking status: Current Every Day Smoker     Packs/day: 1.00     Years: 42.00     Types: Cigarettes   • Smokeless tobacco: Never Used   • Alcohol use No   • Drug use: No   • Sexual activity: Defer     Other Topics Concern   • Not on file     Social History Narrative       Allergies   Allergen Reactions   • Bee Venom Swelling       Review of Systems   Constitutional: Negative for fever.   HENT: Negative for voice change.    Eyes: Negative for visual disturbance.   Respiratory: Negative for shortness of breath.    Cardiovascular: Negative for chest pain.   Gastrointestinal: Negative for abdominal distention and abdominal pain.   Genitourinary: Negative for dysuria.   Musculoskeletal: Positive for arthralgias. Negative for gait problem and joint swelling.   Skin: Negative for rash.   Neurological: Negative for speech difficulty.   Hematological: Does not bruise/bleed easily.   Psychiatric/Behavioral: Negative for confusion.         Objective   Resp 18  Ht 147.3 cm (58\")  Wt 61.2 kg (135 lb)  " LMP  (LMP Unknown) Comment: POSTMENOPAUSAL  BMI 28.22 kg/m2    Physical Exam   Constitutional: She appears well-developed. No distress.   Musculoskeletal: She exhibits no deformity.   Neurological: She is alert.   Skin: Skin is warm and dry. No rash noted. No erythema.   Psychiatric: She has a normal mood and affect. Her speech is normal.   Vitals reviewed.    Back Exam     Muscle Strength   Right Quadriceps:  5/5   Left Quadriceps:  5/5   Right Hamstrings:  5/5   Left Hamstrings:  5/5           Extremity DVT signs are Negative on physical exam with negative Joshua sign, with no calf pain, with no palpable cords, with no increased pain with passive stretch/extension and with no skin tone change   Neurologic Exam     Mental Status   Attention: normal.   Speech: speech is normal   Level of consciousness: alert  Knowledge: good.     Motor Exam   Overall muscle tone: normal    Strength   Right quadriceps: 5/5  Left quadriceps: 5/5  Right hamstrin/5  Left hamstrin/5    Gait, Coordination, and Reflexes     Gait  Gait: (stable gait with trace residual limp as she does not  quite fully straigthen knee in her gait pattern.)    Ortho Exam    Assessment/Plan   Independent Review of Radiographic Studies:    No new imaging done today.  Laboratory and Other Studies:  Recent results were stable and reviewed with patient.   Medical Decision Making:    No complications of procedure and treatments.  Stable neurovascular exam.  Good progress, significantly improved.  Continue current plan, and management.    Procedures  [x]  No procedures were performed in office today.    Nicole was seen today for follow-up.    Diagnoses and all orders for this visit:    Status post total knee replacement using cement, left    Follow-up exam       Regular exercise as tolerated  Orthopedic activities reviewed and patient expressed appreciation  Discussion of orthopedic goals  After care and dental prophylaxis for joint replacement  prosthesis  Guided on proper techniques for mobility, strength, agility and/or conditioning exercises    Recommendations/Plan:  Exercise, medications, injections, other patient advice, and return appointment as noted.  Brace: No brace was given at today's visit  Referral: No referrals made at today's visit  Test/Studies: No additional studies ordered.  Surgery: No surgery proposed at this visit.  Work/Activity Status: May perform usual activities as tolerated  Patient is encouraged to call or return for any issues or concerns.     Return in about 3 months (around 4/22/2018) for Recheck.  Patient agreeable to call or return sooner for any concerns.      Scribed for Jerrell Britt MD by Trista Wells R.N.. 1/22/2018  10:54 AM

## 2018-06-11 ENCOUNTER — OFFICE VISIT (OUTPATIENT)
Dept: ORTHOPEDIC SURGERY | Facility: CLINIC | Age: 63
End: 2018-06-11

## 2018-06-11 VITALS — HEIGHT: 58 IN | BODY MASS INDEX: 25.4 KG/M2 | RESPIRATION RATE: 12 BRPM | WEIGHT: 121 LBS

## 2018-06-11 DIAGNOSIS — Z96.651 HISTORY OF TOTAL KNEE REPLACEMENT, RIGHT: ICD-10-CM

## 2018-06-11 DIAGNOSIS — Z96.652 STATUS POST TOTAL KNEE REPLACEMENT USING CEMENT, LEFT: Primary | ICD-10-CM

## 2018-06-11 DIAGNOSIS — Z09 FOLLOW-UP EXAM: ICD-10-CM

## 2018-06-11 PROCEDURE — 99213 OFFICE O/P EST LOW 20 MIN: CPT | Performed by: ORTHOPAEDIC SURGERY

## 2018-06-11 NOTE — PROGRESS NOTES
Subjective   Patient ID: Nicole Mack is a 62 y.o.  female is here today for orthopaedic evaluation of recovery progress with treatment.  Follow-up of the Left Knee       History of Present Illness     Progress Note:  Patient reports that with treatments and since the last visit, overall pain is decreased, strength is improved, and range of motion is full.  Patient is not currently using pain medication. Physical therapy has been helpful. Injection therapy has not been given..   Since last visit, patient has been on continued disability. Patient does not  present with recent labs, imaging or other studies for review.      Past Medical History:   Diagnosis Date   • Arthritis of back    • Disease of thyroid gland    • Fracture     LEFT PINKY TOE AT PRESENT TIME, RIGHT ANKLE WHEN A TEENAGER   • GERD (gastroesophageal reflux disease)    • H/O seasonal allergies    • High cholesterol    • [place] (hard of hearing)     REPORTS NO HEARING AIDS   • Memory loss     REPORTS WAS RECENTLY DIAGNOSED BY PCP WITH HAVE MILD MEMORY LOSS, EARLY ONSET OF DEMENTIA   • Osteoarthritis    • Stroke syndrome     REPORTS CVA IN 2002. REPORTS WEAKNESS IN EXTREMITIES FROM THIS.    • Valgus deformity, not elsewhere classified, right knee    • Wears glasses    • Wears partial dentures     UPPER PLATE        Past Surgical History:   Procedure Laterality Date   • BREAST CYST EXCISION Left     REPORTS EARLINE, REPORTS NO RESTRICTIONS ON LUE   • CATARACT EXTRACTION W/ INTRAOCULAR LENS IMPLANT Left 7/27/2017    Procedure: CATARACT PHACO EXTRACTION WITH INTRAOCULAR LENS IMPLANT LEFT;  Surgeon: Jw Mansfield MD;  Location: [facility] OR;  Service:    • CATARACT EXTRACTION W/ INTRAOCULAR LENS IMPLANT Right 8/17/2017    Procedure: CATARACT PHACO EXTRACTION WITH INTRAOCULAR LENS IMPLANT RIGHT;  Surgeon: Jw Mansfield MD;  Location: [facility] OR;  Service:    • MOUTH SURGERY      ORAL EXTRACTIONS   • OOPHORECTOMY Left    • [place] TOTAL KNEE ARTHROPLASTY Left 10/10/2017  "   Procedure: ELECTIVE LEFT TOTAL KNEE ARTHROPLASTY (S&N);  Surgeon: Jerrell Britt MD;  Location: [facility];  Service: Orthopedics   • TOTAL KNEE ARTHROPLASTY Right 02/09/2016    KIRA Britt MD   • TOTAL KNEE ARTHROPLASTY Left 10/10/2017    KIRA Britt MD   • TUBAL ABDOMINAL LIGATION          Family History   Problem Relation Age of Onset   • Diabetes Father    • Stroke Other         Social History     Social History   • Marital status:      Spouse name: N/A   • Number of children: N/A   • Years of education: N/A     Occupational History   • Not on file.     Social History Main Topics   • Smoking status: Current Every Day Smoker     Packs/day: 1.00     Years: 42.00     Types: Cigarettes   • Smokeless tobacco: Never Used   • Alcohol use No   • Drug use: No   • Sexual activity: Defer     Other Topics Concern   • Not on file     Social History Narrative   • No narrative on file       Allergies   Allergen Reactions   • Bee Venom Swelling       Review of Systems   Constitutional: Negative for fever.   Musculoskeletal: Positive for arthralgias ( pain when weather is cool, less pain than before surgery).         Objective   Resp 12   Ht 147.3 cm (58\")   Wt 54.9 kg (121 lb)   LMP  (LMP Unknown) Comment: POSTMENOPAUSAL  BMI 25.29 kg/m²     Physical Exam   Constitutional: She appears well-developed. No distress.   Musculoskeletal:        Right knee: She exhibits no effusion.        Left knee: She exhibits no effusion.   Neurological: She is alert. Gait normal.   Skin: Skin is warm and dry. No rash noted. No erythema.   Psychiatric: She has a normal mood and affect. Her speech is normal.   Vitals reviewed.    Right Knee Exam     Tenderness   The patient is experiencing no tenderness.         Range of Motion   Extension: 0   Flexion: 120     Muscle Strength     The patient has normal right knee strength.    Tests   Varus: negative  Valgus: negative  Patellar Apprehension: negative    Other   Erythema: " absent  Scars: present (well healed)  Pulse: present  Swelling: none  Other tests: no effusion present      Left Knee Exam     Tenderness   The patient is experiencing no tenderness.         Range of Motion   Extension: 0   Flexion: 130     Muscle Strength     The patient has normal left knee strength.    Tests   Varus: negative  Valgus: negative  Patellar Apprehension: negative    Other   Erythema: absent  Scars: present (well healed)  Pulse: present  Swelling: none  Effusion: no effusion present        Extremity DVT signs are Negative on physical exam with negative Joshua sign, with no calf pain, with no palpable cords and with no skin tone change   Neurologic Exam     Mental Status   Attention: normal.   Speech: speech is normal   Level of consciousness: alert  Knowledge: good.     Motor Exam   Overall muscle tone: normal    Gait, Coordination, and Reflexes     Gait  Gait: normal    Left Knee Exam     Muscle Strength   Normal left knee strength    Right Knee Exam     Muscle Strength   Normal right knee strength          Assessment/Plan   Independent Review of Radiographic Studies:    No new imaging done today.  Reviewed with patient at a prior visit.  Laboratory and Other Studies:  No new results reviewed today.   Medical Decision Making:    No complications of procedure and treatments.  Stable neurovascular exam.  Excellent progress.  May resume routine activity, work, sports and/or exercise as tolerated.    Blossom Rivera was seen today for follow-up.    Diagnoses and all orders for this visit:    Status post total knee replacement using cement, left    History of total knee replacement, right    Follow-up exam       Discussion of orthopaedic goals and activities and patient and/or guardian expressed appreciation.  Regular exercise as tolerated  Guided on proper techniques for mobility, strength, agility and/or conditioning exercises  After care and dental prophylaxis for joint replacement  prosthesis    Recommendations/Plan:  Exercise, medications, injections, other patient advice, and return appointment as noted.  Brace: No brace was given at today's visit  Referral: No referrals made at today's visit  Test/Studies: No additional studies ordered.  Surgery: No surgery proposed at this visit.  Work/Activity Status: May perform usual activities as tolerated, May return to routine exercise and physical work as tolerated. and No strenuous activity.  Patient is encouraged to call or return for any issues or concerns.     Return in about 4 months (around 10/11/2018) for XOA both knees, repeat exam, update plan, Annual recheck.  Patient agreeable to call or return sooner for any concerns.      Portions of this note have been scribed for Jerrell Britt MD by Kerrie Ch CMA.. 6/11/2018  3:53 PM

## 2018-10-08 ENCOUNTER — HOSPITAL ENCOUNTER (OUTPATIENT)
Dept: GENERAL RADIOLOGY | Facility: HOSPITAL | Age: 63
Discharge: HOME | End: 2018-10-08
Payer: MEDICAID

## 2018-10-08 ENCOUNTER — HOSPITAL ENCOUNTER (OUTPATIENT)
Dept: MAMMOGRAPHY | Facility: HOSPITAL | Age: 63
Discharge: HOME | End: 2018-10-08
Payer: MEDICAID

## 2018-10-08 DIAGNOSIS — Z12.39 BREAST CANCER SCREENING: ICD-10-CM

## 2018-10-08 DIAGNOSIS — M81.0 OSTEOPOROSIS, UNSPECIFIED OSTEOPOROSIS TYPE, UNSPECIFIED PATHOLOGICAL FRACTURE PRESENCE: ICD-10-CM

## 2018-10-08 PROCEDURE — 77080 DXA BONE DENSITY AXIAL: CPT

## 2018-10-08 PROCEDURE — 77067 SCR MAMMO BI INCL CAD: CPT

## 2018-10-09 DIAGNOSIS — Z96.651 HISTORY OF TOTAL KNEE REPLACEMENT, RIGHT: ICD-10-CM

## 2018-10-09 DIAGNOSIS — Z96.652 STATUS POST TOTAL KNEE REPLACEMENT USING CEMENT, LEFT: Primary | ICD-10-CM

## 2018-10-10 ENCOUNTER — OFFICE VISIT (OUTPATIENT)
Dept: ORTHOPEDIC SURGERY | Facility: CLINIC | Age: 63
End: 2018-10-10

## 2018-10-10 VITALS — HEIGHT: 58 IN | BODY MASS INDEX: 27.29 KG/M2 | WEIGHT: 130 LBS | RESPIRATION RATE: 18 BRPM

## 2018-10-10 DIAGNOSIS — S83.412D SPRAIN OF MEDIAL COLLATERAL LIGAMENT OF LEFT KNEE, SUBSEQUENT ENCOUNTER: ICD-10-CM

## 2018-10-10 DIAGNOSIS — Z09 FOLLOW-UP EXAM: ICD-10-CM

## 2018-10-10 DIAGNOSIS — Z96.652 STATUS POST TOTAL KNEE REPLACEMENT USING CEMENT, LEFT: Primary | ICD-10-CM

## 2018-10-10 DIAGNOSIS — Z96.651 HISTORY OF TOTAL KNEE REPLACEMENT, RIGHT: ICD-10-CM

## 2018-10-10 PROCEDURE — 73560 X-RAY EXAM OF KNEE 1 OR 2: CPT | Performed by: ORTHOPAEDIC SURGERY

## 2018-10-10 PROCEDURE — 99213 OFFICE O/P EST LOW 20 MIN: CPT | Performed by: ORTHOPAEDIC SURGERY

## 2019-05-10 ENCOUNTER — HOSPITAL ENCOUNTER (OUTPATIENT)
Dept: SLEEP CENTER | Facility: HOSPITAL | Age: 64
Discharge: HOME | End: 2019-05-12
Payer: MEDICAID

## 2019-05-10 PROCEDURE — 95810 POLYSOM 6/> YRS 4/> PARAM: CPT

## 2019-05-22 ENCOUNTER — OFFICE VISIT (OUTPATIENT)
Dept: ORTHOPEDIC SURGERY | Facility: CLINIC | Age: 64
End: 2019-05-22

## 2019-05-22 VITALS — BODY MASS INDEX: 26.87 KG/M2 | HEIGHT: 58 IN | RESPIRATION RATE: 18 BRPM | WEIGHT: 128 LBS

## 2019-05-22 DIAGNOSIS — Z96.652 STATUS POST TOTAL KNEE REPLACEMENT USING CEMENT, LEFT: ICD-10-CM

## 2019-05-22 DIAGNOSIS — M16.12 PRIMARY OSTEOARTHRITIS OF LEFT HIP: ICD-10-CM

## 2019-05-22 DIAGNOSIS — M25.552 ARTHRALGIA OF LEFT HIP: ICD-10-CM

## 2019-05-22 DIAGNOSIS — M25.562 ARTHRALGIA OF LEFT KNEE: Primary | ICD-10-CM

## 2019-05-22 PROCEDURE — 99213 OFFICE O/P EST LOW 20 MIN: CPT | Performed by: PHYSICIAN ASSISTANT

## 2019-05-22 NOTE — PROGRESS NOTES
Subjective   Patient ID: Nicole Mack is a 63 y.o. right hand dominant female  Follow-up and Pain of the Left Knee (Patient is here today for left knee pain, patient had a total knee in 10/2017. Her pain level is 4/10.)         History of Present Illness    Patient presents with complaints of left knee and left hip pain.  She states the hip pain is intermittent however the left knee pain is constant and worse with walking.  Patient denies injury or trauma.  She denies redness or warmth to either joint.          Past Medical History:   Diagnosis Date   • Arthritis of back    • Disease of thyroid gland    • Fracture     LEFT PINKY TOE AT PRESENT TIME, RIGHT ANKLE WHEN A TEENAGER   • GERD (gastroesophageal reflux disease)    • H/O seasonal allergies    • High cholesterol    • [place] (hard of hearing)     REPORTS NO HEARING AIDS   • Memory loss     REPORTS WAS RECENTLY DIAGNOSED BY PCP WITH HAVE MILD MEMORY LOSS, EARLY ONSET OF DEMENTIA   • Osteoarthritis    • Stroke syndrome     REPORTS CVA IN 2002. REPORTS WEAKNESS IN EXTREMITIES FROM THIS.    • Valgus deformity, not elsewhere classified, right knee    • Wears glasses    • Wears partial dentures     UPPER PLATE        Past Surgical History:   Procedure Laterality Date   • BREAST CYST EXCISION Left     REPORTS EARLINE, REPORTS NO RESTRICTIONS ON LUE   • CATARACT EXTRACTION W/ INTRAOCULAR LENS IMPLANT Left 7/27/2017    Procedure: CATARACT PHACO EXTRACTION WITH INTRAOCULAR LENS IMPLANT LEFT;  Surgeon: Jw Mansfield MD;  Location: [facility] OR;  Service:    • CATARACT EXTRACTION W/ INTRAOCULAR LENS IMPLANT Right 8/17/2017    Procedure: CATARACT PHACO EXTRACTION WITH INTRAOCULAR LENS IMPLANT RIGHT;  Surgeon: Jw Mansfield MD;  Location: [facility] OR;  Service:    • MOUTH SURGERY      ORAL EXTRACTIONS   • OOPHORECTOMY Left    • AR TOTAL KNEE ARTHROPLASTY Left 10/10/2017    Procedure: ELECTIVE LEFT TOTAL KNEE ARTHROPLASTY (S&N);  Surgeon: Jerrell Britt MD;  Location: [facility] OR;   Service: Orthopedics   • TOTAL KNEE ARTHROPLASTY Right 02/09/2016    KIRA Britt MD   • TOTAL KNEE ARTHROPLASTY Left 10/10/2017    KIRA Britt MD   • TUBAL ABDOMINAL LIGATION         Family History   Problem Relation Age of Onset   • Diabetes Father    • Stroke Other        Social History     Socioeconomic History   • Marital status:      Spouse name: Not on file   • Number of children: Not on file   • Years of education: Not on file   • Highest education level: Not on file   Tobacco Use   • Smoking status: Current Every Day Smoker     Packs/day: 1.00     Years: 42.00     Pack years: 42.00     Types: Cigarettes   • Smokeless tobacco: Never Used   Substance and Sexual Activity   • Alcohol use: No   • Drug use: No   • Sexual activity: Defer       I have reviewed all of the above social hx, family hx, surgical hx, medications, allergies & ROS and confirm that it is accurate.      Current Outpatient Medications:   •  alendronate (FOSAMAX) 70 MG tablet, Take 70 mg by mouth Every 7 (Seven) Days., Disp: , Rfl:   •  aspirin 325 MG EC tablet, Take 325 mg by mouth Daily., Disp: , Rfl:   •  atorvastatin (LIPITOR) 10 MG tablet, Take 10 mg by mouth Daily., Disp: , Rfl:   •  atorvastatin (LIPITOR) 20 MG tablet, , Disp: , Rfl: 2  •  cetirizine (zyrTEC) 10 MG tablet, Take 10 mg by mouth Daily., Disp: , Rfl:   •  cyanocobalamin 1000 MCG/ML injection, Inject 1,000 mcg into the shoulder, thigh, or buttocks Every 28 (Twenty-Eight) Days., Disp: , Rfl:   •  HYDROcodone-acetaminophen (NORCO) 7.5-325 MG per tablet, Take 1 tablet by mouth 2 (Two) Times a Day As Needed (for pain)., Disp: 30 tablet, Rfl: 0  •  levothyroxine (SYNTHROID, LEVOTHROID) 75 MCG tablet, Take 75 mcg by mouth Daily., Disp: , Rfl:   •  omeprazole (priLOSEC) 20 MG capsule, Take 20 mg by mouth Every Morning., Disp: , Rfl:     Allergies   Allergen Reactions   • Bee Venom Swelling       Review of Systems   Constitutional: Negative for fever.   HENT: Negative for  "voice change.    Eyes: Negative for visual disturbance.   Respiratory: Negative for shortness of breath.    Cardiovascular: Negative for chest pain.   Gastrointestinal: Negative for abdominal distention and abdominal pain.   Genitourinary: Negative for dysuria.   Musculoskeletal: Positive for arthralgias. Negative for gait problem and joint swelling.   Skin: Negative for rash.   Neurological: Negative for speech difficulty.   Hematological: Does not bruise/bleed easily.   Psychiatric/Behavioral: Negative for confusion.       Objective   Resp 18   Ht 147.3 cm (58\")   Wt 58.1 kg (128 lb)   LMP  (LMP Unknown) Comment: POSTMENOPAUSAL  BMI 26.75 kg/m²    Physical Exam   Constitutional: She is oriented to person, place, and time. She appears well-developed.   Pulmonary/Chest: Effort normal.   Musculoskeletal:        Left hip: She exhibits tenderness.        Left knee: She exhibits normal range of motion, no swelling, no effusion, no ecchymosis and no erythema. Tenderness found. Lateral joint line tenderness noted.        Left ankle: She exhibits no swelling and no ecchymosis. No tenderness. No lateral malleolus and no medial malleolus tenderness found.   Neurological: She is alert and oriented to person, place, and time.   Psychiatric: She has a normal mood and affect.   Vitals reviewed.    Left Knee Exam     Range of Motion   Extension: 0   Left knee flexion: 115.     Other   Erythema: absent  Scars: present  Sensation: normal  Effusion: no effusion present      Left Hip Exam     Range of Motion   Abduction: 40   Flexion: 80     Muscle Strength   Abduction: 4/5   Flexion: 4/5     Tests   GANGA: positive    Other   Sensation: normal           Extremity DVT signs are Negative on physical exam with negative Joshua sign, with no calf pain, with no palpable cords, with no increased pain with passive stretch/extension and with no skin tone change   Neurologic Exam     Mental Status   Oriented to person, place, and time.    "            Assessment/Plan   Independent Review of Radiographic Studies:    Shows no acute fracture or dislocation.      Procedures       Nicole was seen today for follow-up and pain.    Diagnoses and all orders for this visit:    Arthralgia of left knee  -     XR Knee 1 or 2 View Left  -     NM Bone Scan 3 Phase    Arthralgia of left hip  -     XR Hip With or Without Pelvis 2 - 3 View Left  -     FL Guided Pain Management Large Joint    Status post total knee replacement using cement, left  -     NM Bone Scan 3 Phase    Primary osteoarthritis of left hip  -     FL Guided Pain Management Large Joint       Discussion of orthopedic goals  Risk, benefits, and merits of treatment alternatives reviewed with the patient and questions answered  Ice, heat, and/or modalities as beneficial    Recommendations/Plan:  Exercise, medications, injections, other patient advice, and return appointment as noted.  Patient is encouraged to call or return for any issues or concerns.    FU after 3pbs    Patient agreeable to call or return sooner for any concerns.

## 2019-05-28 ENCOUNTER — HOSPITAL ENCOUNTER (OUTPATIENT)
Dept: NUCLEAR MEDICINE | Facility: HOSPITAL | Age: 64
Discharge: HOME | End: 2019-05-28

## 2019-05-28 PROCEDURE — 78315 BONE IMAGING 3 PHASE: CPT

## 2019-05-28 PROCEDURE — 0 TECHNETIUM MEDRONATE KIT: Performed by: PHYSICIAN ASSISTANT

## 2019-05-28 PROCEDURE — A9503 TC99M MEDRONATE: HCPCS | Performed by: PHYSICIAN ASSISTANT

## 2019-05-28 RX ORDER — TC 99M MEDRONATE 20 MG/10ML
24.4 INJECTION, POWDER, LYOPHILIZED, FOR SOLUTION INTRAVENOUS
Status: COMPLETED | OUTPATIENT
Start: 2019-05-28 | End: 2019-05-28

## 2019-05-28 RX ADMIN — TC 99M MEDRONATE 24.4 MILLICURIE: 20 INJECTION, POWDER, LYOPHILIZED, FOR SOLUTION INTRAVENOUS at 10:10

## 2019-06-21 ENCOUNTER — APPOINTMENT (OUTPATIENT)
Dept: GENERAL RADIOLOGY | Facility: HOSPITAL | Age: 64
End: 2019-06-21

## 2019-07-05 ENCOUNTER — HOSPITAL ENCOUNTER (OUTPATIENT)
Dept: GENERAL RADIOLOGY | Facility: HOSPITAL | Age: 64
Discharge: HOME | End: 2019-07-05
Admitting: PHYSICIAN ASSISTANT

## 2019-07-05 PROCEDURE — 25010000003 LIDOCAINE 1 % SOLUTION: Performed by: PHYSICIAN ASSISTANT

## 2019-07-05 PROCEDURE — 77002 NEEDLE LOCALIZATION BY XRAY: CPT

## 2019-07-05 PROCEDURE — 25010000002 METHYLPREDNISOLONE PER 80 MG: Performed by: PHYSICIAN ASSISTANT

## 2019-07-05 RX ORDER — LIDOCAINE HYDROCHLORIDE 10 MG/ML
20 INJECTION, SOLUTION INFILTRATION; PERINEURAL ONCE
Status: COMPLETED | OUTPATIENT
Start: 2019-07-05 | End: 2019-07-05

## 2019-07-05 RX ORDER — METHYLPREDNISOLONE ACETATE 80 MG/ML
80 INJECTION, SUSPENSION INTRA-ARTICULAR; INTRALESIONAL; INTRAMUSCULAR; SOFT TISSUE ONCE
Status: COMPLETED | OUTPATIENT
Start: 2019-07-05 | End: 2019-07-05

## 2019-07-05 RX ADMIN — LIDOCAINE HYDROCHLORIDE 9 ML: 10 INJECTION, SOLUTION INFILTRATION; PERINEURAL at 14:27

## 2019-07-05 RX ADMIN — METHYLPREDNISOLONE ACETATE 80 MG: 80 INJECTION, SUSPENSION INTRA-ARTICULAR; INTRALESIONAL; INTRAMUSCULAR; SOFT TISSUE at 14:30

## 2019-07-05 NOTE — POST-PROCEDURE NOTE
[facility]  [address], [address]  [place] [address]  [phone]        PROCEDURE REPORT        DIAGNOSIS:  Left hip osteoarthritis, symptomatic    PROCEDURE: Left hip injection under flouroscopy      Nicole Mack with date of birth 1955  presents to [facility] Radiology Department today for injection therapy.        Patient presents to [facility] Radiology Department Flouroscopy Suite on 7/5/2019 for planned elective left hip injection under flouroscopy for symptomatic osteoarthritis.    Procedure:     After consent was obtained, and using ethyl chloride topical local anesthetic, the left hip was then prepped and draped with sterile technique. With an anterior hip approach and flouroscopy guidance, and care to stay lateral of the femoral artery, the hip joint was entered via a 20 gauge spinal needle.  A mixture of 80 mg methylprednisolone in one ml of methylprednisolone plus 9 ml of 1% plain Lidocaine was injected and the needle withdrawn. The procedure was well tolerated and without complication. The patient noted relief of focal hip joint pain.  The patient did remain stable and with baseline ambulation. The patient is asked to rest the joint for a few more days before resuming full regular activities. It may be painful for the first few days. Watch for fever, skin issues, increased swelling or persistent pain in the joint. Call or return to clinic if such symptoms occur, other concerns or if there is lack of improvement as anticipated.    Impression: Symptomatic left hip osteoarthritis.      Recommendations/Plan:      Treatment and patient advice as noted here and in office visit report.  Orthopedic activities reviewed and patient expressed appreciation.  Discussion of orthopedic goals.   Risk, benefits, and merits of treatment options reviewed and questions answered.  Call or notify for any adverse effect from injection therapy.    Exercise: As tolerated.  No  strenuous activity for a few days as appropriate.  Brace:  No brace was given at today's visit  Referral: No referrals made at today's visit  Studies: No additional studies ordered.  Surgery: No surgery proposed at this visit.  Activity:  May perform usual activities as tolerated.      Patient will return to our clinic at scheduled appointment.  Patient agreeable to call or return sooner for any concerns.

## 2019-10-16 ENCOUNTER — HOSPITAL ENCOUNTER (OUTPATIENT)
Dept: MAMMOGRAPHY | Facility: HOSPITAL | Age: 64
Discharge: HOME | End: 2019-10-16
Payer: MEDICAID

## 2019-10-16 DIAGNOSIS — Z12.31 BREAST CANCER SCREENING BY MAMMOGRAM: ICD-10-CM

## 2019-10-16 PROCEDURE — 77067 SCR MAMMO BI INCL CAD: CPT

## 2019-10-29 ENCOUNTER — HOSPITAL ENCOUNTER (OUTPATIENT)
Dept: ULTRASOUND IMAGING | Facility: HOSPITAL | Age: 64
Discharge: HOME | End: 2019-10-29
Payer: MEDICAID

## 2019-10-29 ENCOUNTER — HOSPITAL ENCOUNTER (OUTPATIENT)
Dept: MAMMOGRAPHY | Facility: HOSPITAL | Age: 64
Discharge: HOME | End: 2019-10-29
Payer: MEDICAID

## 2019-10-29 DIAGNOSIS — R92.8 ABNORMAL MAMMOGRAM: ICD-10-CM

## 2019-10-29 PROCEDURE — 77065 DX MAMMO INCL CAD UNI: CPT

## 2019-10-29 PROCEDURE — 76642 ULTRASOUND BREAST LIMITED: CPT

## 2019-11-19 NOTE — PROGRESS NOTES
Patient: Nicole Mack    YOB: 1955    Date: 11/20/2019    Primary Care Provider: Kayla Lopez DO    Chief Complaint   Patient presents with   • Abnormal Breast Imaging       Subjective .     History of present illness:  I saw the patient in the office  today for evaluation and treatment of abnormal breast imaging. Patient denies pain, nipple drainage or discoloration.  Ultrasound indicates a BI-RADS 4 lesion right breast.  Appears to be a complex cyst.  No palpable mass per patient.  No family history of breast cancer.    Review of Systems   Constitutional: Negative for chills, fever and unexpected weight change.   HENT: Negative for hearing loss, trouble swallowing and voice change.    Eyes: Negative for visual disturbance.   Respiratory: Negative for apnea, cough, chest tightness, shortness of breath and wheezing.    Cardiovascular: Negative for chest pain, palpitations and leg swelling.   Gastrointestinal: Negative for abdominal distention, abdominal pain, anal bleeding, blood in stool, constipation, diarrhea, nausea, rectal pain and vomiting.   Endocrine: Negative for cold intolerance and heat intolerance.   Genitourinary: Negative for difficulty urinating, dysuria and flank pain.   Musculoskeletal: Negative for back pain and gait problem.   Skin: Negative for color change, rash and wound.   Neurological: Negative for dizziness, syncope, speech difficulty, weakness, light-headedness, numbness and headaches.   Hematological: Negative for adenopathy. Does not bruise/bleed easily.   Psychiatric/Behavioral: Negative for confusion. The patient is not nervous/anxious.        History:  Past Medical History:   Diagnosis Date   • Arthritis of back    • Disease of thyroid gland    • Fracture     LEFT PINKY TOE AT PRESENT TIME, RIGHT ANKLE WHEN A TEENAGER   • GERD (gastroesophageal reflux disease)    • H/O seasonal allergies    • High cholesterol    • [place] (hard of hearing)     REPORTS NO HEARING  AIDS   • Hyperlipidemia    • Memory loss     REPORTS WAS RECENTLY DIAGNOSED BY PCP WITH HAVE MILD MEMORY LOSS, EARLY ONSET OF DEMENTIA   • Osteoarthritis    • Stroke syndrome     REPORTS CVA IN 2002. REPORTS WEAKNESS IN EXTREMITIES FROM THIS.    • Valgus deformity, not elsewhere classified, right knee    • Wears glasses    • Wears partial dentures     UPPER PLATE          Past Surgical History:   Procedure Laterality Date   • BREAST CYST EXCISION Left     REPORTS BENINGN, REPORTS NO RESTRICTIONS ON LUE   • CATARACT EXTRACTION W/ INTRAOCULAR LENS IMPLANT Left 7/27/2017    Procedure: CATARACT PHACO EXTRACTION WITH INTRAOCULAR LENS IMPLANT LEFT;  Surgeon: Jw Mansfield MD;  Location: [facility] OR;  Service:    • CATARACT EXTRACTION W/ INTRAOCULAR LENS IMPLANT Right 8/17/2017    Procedure: CATARACT PHACO EXTRACTION WITH INTRAOCULAR LENS IMPLANT RIGHT;  Surgeon: Jw Mansfield MD;  Location: [facility] OR;  Service:    • MOUTH SURGERY      ORAL EXTRACTIONS   • OOPHORECTOMY Left    • IL TOTAL KNEE ARTHROPLASTY Left 10/10/2017    Procedure: ELECTIVE LEFT TOTAL KNEE ARTHROPLASTY (S&N);  Surgeon: Jerrell Britt MD;  Location: [facility] OR;  Service: Orthopedics   • TOTAL KNEE ARTHROPLASTY Right 02/09/2016    KIRA Britt MD   • TOTAL KNEE ARTHROPLASTY Left 10/10/2017    KIRA Britt MD   • TUBAL ABDOMINAL LIGATION         Family History   Problem Relation Age of Onset   • Diabetes Father    • Stroke Other        Social History     Tobacco Use   • Smoking status: Current Every Day Smoker     Packs/day: 1.00     Years: 42.00     Pack years: 42.00     Types: Cigarettes   • Smokeless tobacco: Never Used   Substance Use Topics   • Alcohol use: No   • Drug use: No       Allergies:  Allergies   Allergen Reactions   • Bee Venom Swelling       Medications:     Current Outpatient Medications:   •  alendronate (FOSAMAX) 70 MG tablet, Take 70 mg by mouth Every 7 (Seven) Days., Disp: , Rfl:   •  aspirin 325 MG EC tablet, Take 325 mg by mouth Daily.,  "Disp: , Rfl:   •  atorvastatin (LIPITOR) 10 MG tablet, Take 10 mg by mouth Daily., Disp: , Rfl:   •  cetirizine (zyrTEC) 10 MG tablet, Take 10 mg by mouth Daily., Disp: , Rfl:   •  cyanocobalamin 1000 MCG/ML injection, Inject 1,000 mcg into the shoulder, thigh, or buttocks Every 28 (Twenty-Eight) Days., Disp: , Rfl:   •  levothyroxine (SYNTHROID, LEVOTHROID) 75 MCG tablet, Take 75 mcg by mouth Daily., Disp: , Rfl:   •  meloxicam (MOBIC) 15 MG tablet, Take 15 mg by mouth Daily., Disp: , Rfl: 2  •  omeprazole (priLOSEC) 20 MG capsule, Take 20 mg by mouth Every Morning., Disp: , Rfl:     Objective     Vital Signs:   Vitals:    11/20/19 1054   BP: 110/74   Pulse: 96   Temp: 98 °F (36.7 °C)   SpO2: 97%   Weight: 56.7 kg (125 lb)   Height: 147.3 cm (58\")       Physical Exam:     General Appearance:    Alert, cooperative, in no acute distress   Head:    Normocephalic, without obvious abnormality, atraumatic   Eyes:            Lids and lashes normal, conjunctivae and sclerae normal, no   icterus, no pallor, corneas clear,   Ears:    Ears appear intact with no abnormalities noted   Throat:   No oral lesions, no thrush, oral mucosa moist   Breast:    No palpable mass in the right breast or axilla   Lungs:     Clear to auscultation,respirations regular, even and                  Unlabored    Heart:    Regular rhythm and normal rate, no murmur, no gallop.   Chest Wall:    No abnormalities observed   Abdomen:     Normal bowel sounds, no masses, no organomegaly, soft        non-tender, non-distended, no guarding.   Extremities:   Moves all extremities well, no edema, no cyanosis, no             redness   Pulses:   Pulses palpable and equal bilaterally   Skin:   No bleeding, bruising or rash   Lymph nodes:   No palpable adenopathy   Neurologic:   Cranial nerves 2 - 12 grossly intact.           Results Review:   I reviewed the patient's new clinical results.      Assessment / Plan:    1. Cyst, breast, right        I did have a " detailed and extensive discussion with the patient in the office today and reviewed her recent workup.  I have told the patient that she has a right breast cyst, BI-RADS 4 on ultrasound reading.  Plans for ultrasound with FNA drainage today.    Procedure: FNA right breast cyst with ultrasound guidance    I recommend a FNA of the right breast lesion. The procedure and risks were clearly explained including bleeding, infection and requirement for re-biopsy and the patient understood these and wishes to proceed.    The patient was brought to the procedure room. Consent and time out were performed. The area was prepped and draped in the usual fashion. 1% lidocaine with epinephrine was infused locally. A 20G needle was used for biopsy under ultrasound guidance. Minimal blood loss had occurred and hemostasis had been well controlled with pressure. . The patient tolerated the procedure and there were no complications. We will make a f/u appointment to discuss results.      Electronically signed by Valerie Lamas MD  11/20/19  11:20 AM

## 2019-11-20 ENCOUNTER — OFFICE VISIT (OUTPATIENT)
Dept: SURGERY | Facility: CLINIC | Age: 64
End: 2019-11-20

## 2019-11-20 VITALS
DIASTOLIC BLOOD PRESSURE: 74 MMHG | OXYGEN SATURATION: 97 % | HEIGHT: 58 IN | BODY MASS INDEX: 26.24 KG/M2 | SYSTOLIC BLOOD PRESSURE: 110 MMHG | TEMPERATURE: 98 F | HEART RATE: 96 BPM | WEIGHT: 125 LBS

## 2019-11-20 DIAGNOSIS — N60.01 CYST, BREAST, RIGHT: Primary | ICD-10-CM

## 2019-11-20 PROCEDURE — 99203 OFFICE O/P NEW LOW 30 MIN: CPT | Performed by: SURGERY

## 2019-11-20 PROCEDURE — 10005 FNA BX W/US GDN 1ST LES: CPT | Performed by: SURGERY

## 2019-11-20 RX ORDER — MELOXICAM 15 MG/1
15 TABLET ORAL DAILY
Refills: 2 | COMMUNITY
Start: 2019-09-13

## 2019-11-29 ENCOUNTER — APPOINTMENT (OUTPATIENT)
Dept: GENERAL RADIOLOGY | Facility: HOSPITAL | Age: 64
End: 2019-11-29
Payer: MEDICAID

## 2019-11-29 ENCOUNTER — HOSPITAL ENCOUNTER (EMERGENCY)
Facility: HOSPITAL | Age: 64
Discharge: HOME | End: 2019-11-29
Attending: HOSPITALIST
Payer: MEDICAID

## 2019-11-29 VITALS
OXYGEN SATURATION: 92 % | HEART RATE: 55 BPM | HEIGHT: 58 IN | DIASTOLIC BLOOD PRESSURE: 78 MMHG | TEMPERATURE: 98.4 F | BODY MASS INDEX: 26.45 KG/M2 | SYSTOLIC BLOOD PRESSURE: 142 MMHG | WEIGHT: 126 LBS | RESPIRATION RATE: 18 BRPM

## 2019-11-29 DIAGNOSIS — M75.22 BICEPS TENDONITIS ON LEFT: Primary | ICD-10-CM

## 2019-11-29 LAB
A/G RATIO: 1.2 (ref 0.8–2)
ALBUMIN SERPL-MCNC: 3.8 G/DL (ref 3.4–4.8)
ALP BLD-CCNC: 105 U/L (ref 25–100)
ALT SERPL-CCNC: 18 U/L (ref 4–36)
ANION GAP SERPL CALCULATED.3IONS-SCNC: 12 MMOL/L (ref 3–16)
AST SERPL-CCNC: 19 U/L (ref 8–33)
BASOPHILS ABSOLUTE: 0.1 K/UL (ref 0–0.1)
BASOPHILS RELATIVE PERCENT: 0.7 %
BILIRUB SERPL-MCNC: <0.2 MG/DL (ref 0.3–1.2)
BUN BLDV-MCNC: 13 MG/DL (ref 6–20)
CALCIUM SERPL-MCNC: 9.7 MG/DL (ref 8.5–10.5)
CHLORIDE BLD-SCNC: 104 MMOL/L (ref 98–107)
CO2: 25 MMOL/L (ref 20–30)
CREAT SERPL-MCNC: 0.6 MG/DL (ref 0.4–1.2)
EOSINOPHILS ABSOLUTE: 0.2 K/UL (ref 0–0.4)
EOSINOPHILS RELATIVE PERCENT: 3 %
GFR AFRICAN AMERICAN: >59
GFR NON-AFRICAN AMERICAN: >60
GLOBULIN: 3.3 G/DL
GLUCOSE BLD-MCNC: 92 MG/DL (ref 74–106)
HCT VFR BLD CALC: 44.6 % (ref 37–47)
HEMOGLOBIN: 14.5 G/DL (ref 11.5–16.5)
IMMATURE GRANULOCYTES #: 0 K/UL
IMMATURE GRANULOCYTES %: 0.1 % (ref 0–5)
LYMPHOCYTES ABSOLUTE: 3.1 K/UL (ref 1.5–4)
LYMPHOCYTES RELATIVE PERCENT: 38.5 %
MCH RBC QN AUTO: 31.1 PG (ref 27–32)
MCHC RBC AUTO-ENTMCNC: 32.5 G/DL (ref 31–35)
MCV RBC AUTO: 95.7 FL (ref 80–100)
MONOCYTES ABSOLUTE: 0.8 K/UL (ref 0.2–0.8)
MONOCYTES RELATIVE PERCENT: 9.7 %
NEUTROPHILS ABSOLUTE: 3.8 K/UL (ref 2–7.5)
NEUTROPHILS RELATIVE PERCENT: 48 %
PDW BLD-RTO: 14.4 % (ref 11–16)
PLATELET # BLD: 382 K/UL (ref 150–400)
PMV BLD AUTO: 10 FL (ref 6–10)
POTASSIUM SERPL-SCNC: 3.9 MMOL/L (ref 3.4–5.1)
RBC # BLD: 4.66 M/UL (ref 3.8–5.8)
SODIUM BLD-SCNC: 141 MMOL/L (ref 136–145)
TOTAL PROTEIN: 7.1 G/DL (ref 6.4–8.3)
TROPONIN: <0.3 NG/ML
WBC # BLD: 8 K/UL (ref 4–11)

## 2019-11-29 PROCEDURE — 93005 ELECTROCARDIOGRAM TRACING: CPT

## 2019-11-29 PROCEDURE — 36415 COLL VENOUS BLD VENIPUNCTURE: CPT

## 2019-11-29 PROCEDURE — 80053 COMPREHEN METABOLIC PANEL: CPT

## 2019-11-29 PROCEDURE — 99283 EMERGENCY DEPT VISIT LOW MDM: CPT

## 2019-11-29 PROCEDURE — 6360000002 HC RX W HCPCS: Performed by: HOSPITALIST

## 2019-11-29 PROCEDURE — 6370000000 HC RX 637 (ALT 250 FOR IP): Performed by: HOSPITALIST

## 2019-11-29 PROCEDURE — 73030 X-RAY EXAM OF SHOULDER: CPT

## 2019-11-29 PROCEDURE — 84484 ASSAY OF TROPONIN QUANT: CPT

## 2019-11-29 PROCEDURE — 85025 COMPLETE CBC W/AUTO DIFF WBC: CPT

## 2019-11-29 PROCEDURE — 96372 THER/PROPH/DIAG INJ SC/IM: CPT

## 2019-11-29 RX ORDER — CYCLOBENZAPRINE HCL 10 MG
10 TABLET ORAL ONCE
Status: COMPLETED | OUTPATIENT
Start: 2019-11-29 | End: 2019-11-29

## 2019-11-29 RX ORDER — NAPROXEN 500 MG/1
500 TABLET ORAL 2 TIMES DAILY PRN
Qty: 20 TABLET | Refills: 0 | Status: SHIPPED | OUTPATIENT
Start: 2019-11-29 | End: 2019-12-09

## 2019-11-29 RX ORDER — KETOROLAC TROMETHAMINE 30 MG/ML
30 INJECTION, SOLUTION INTRAMUSCULAR; INTRAVENOUS ONCE
Status: COMPLETED | OUTPATIENT
Start: 2019-11-29 | End: 2019-11-29

## 2019-11-29 RX ORDER — CYCLOBENZAPRINE HCL 10 MG
10 TABLET ORAL 3 TIMES DAILY PRN
Qty: 20 TABLET | Refills: 0 | Status: SHIPPED | OUTPATIENT
Start: 2019-11-29 | End: 2019-12-09

## 2019-11-29 RX ADMIN — KETOROLAC TROMETHAMINE 30 MG: 30 INJECTION, SOLUTION INTRAMUSCULAR; INTRAVENOUS at 19:15

## 2019-11-29 RX ADMIN — CYCLOBENZAPRINE HYDROCHLORIDE 10 MG: 10 TABLET, FILM COATED ORAL at 19:15

## 2019-11-29 ASSESSMENT — PAIN SCALES - GENERAL
PAINLEVEL_OUTOF10: 6
PAINLEVEL_OUTOF10: 6

## 2019-11-29 ASSESSMENT — PAIN DESCRIPTION - LOCATION: LOCATION: SHOULDER

## 2019-11-29 ASSESSMENT — PAIN DESCRIPTION - FREQUENCY: FREQUENCY: CONTINUOUS

## 2019-11-29 ASSESSMENT — PAIN DESCRIPTION - PAIN TYPE: TYPE: ACUTE PAIN

## 2019-11-29 ASSESSMENT — PAIN DESCRIPTION - DESCRIPTORS: DESCRIPTORS: DULL

## 2019-11-29 ASSESSMENT — PAIN DESCRIPTION - ORIENTATION: ORIENTATION: LEFT

## 2019-12-18 DIAGNOSIS — M25.511 ARTHRALGIA OF SHOULDER REGION, RIGHT: Primary | ICD-10-CM

## 2020-11-06 ENCOUNTER — HOSPITAL ENCOUNTER (OUTPATIENT)
Dept: GENERAL RADIOLOGY | Facility: HOSPITAL | Age: 65
Discharge: HOME | End: 2020-11-06
Payer: MEDICAID

## 2020-11-06 ENCOUNTER — HOSPITAL ENCOUNTER (OUTPATIENT)
Dept: MAMMOGRAPHY | Facility: HOSPITAL | Age: 65
Discharge: HOME | End: 2020-11-06
Payer: MEDICAID

## 2020-11-06 PROCEDURE — 77080 DXA BONE DENSITY AXIAL: CPT

## 2020-11-06 PROCEDURE — 77067 SCR MAMMO BI INCL CAD: CPT

## 2020-12-15 ENCOUNTER — HOSPITAL ENCOUNTER (OUTPATIENT)
Dept: GENERAL RADIOLOGY | Facility: HOSPITAL | Age: 65
Discharge: HOME | End: 2020-12-15
Payer: MEDICARE

## 2020-12-15 ENCOUNTER — HOSPITAL ENCOUNTER (OUTPATIENT)
Dept: MRI IMAGING | Facility: HOSPITAL | Age: 65
Discharge: HOME | End: 2020-12-15
Payer: MEDICARE

## 2020-12-15 ENCOUNTER — HOSPITAL ENCOUNTER (OUTPATIENT)
Facility: HOSPITAL | Age: 65
Discharge: HOME | End: 2020-12-15
Payer: MEDICARE

## 2020-12-15 PROCEDURE — 73562 X-RAY EXAM OF KNEE 3: CPT

## 2020-12-15 PROCEDURE — 72148 MRI LUMBAR SPINE W/O DYE: CPT

## 2021-02-08 ENCOUNTER — TELEPHONE (OUTPATIENT)
Dept: SURGERY | Facility: CLINIC | Age: 66
End: 2021-02-08

## 2021-02-08 NOTE — TELEPHONE ENCOUNTER
Patient no  Showed for appointment with  Dr Lamas. Called patient no answer,left message tor patient to call office.

## 2021-04-02 ENCOUNTER — OFFICE VISIT (OUTPATIENT)
Dept: SURGERY | Facility: CLINIC | Age: 66
End: 2021-04-02

## 2021-04-02 VITALS
TEMPERATURE: 98.7 F | SYSTOLIC BLOOD PRESSURE: 128 MMHG | OXYGEN SATURATION: 98 % | BODY MASS INDEX: 30.14 KG/M2 | HEIGHT: 56 IN | WEIGHT: 134 LBS | HEART RATE: 97 BPM | DIASTOLIC BLOOD PRESSURE: 88 MMHG

## 2021-04-02 DIAGNOSIS — D49.2 SKIN NEOPLASM: Primary | ICD-10-CM

## 2021-04-02 DIAGNOSIS — N63.0 BREAST MASS: ICD-10-CM

## 2021-04-02 PROCEDURE — 99213 OFFICE O/P EST LOW 20 MIN: CPT | Performed by: SURGERY

## 2021-04-02 RX ORDER — ATORVASTATIN CALCIUM 20 MG/1
20 TABLET, FILM COATED ORAL DAILY
COMMUNITY
Start: 2021-03-23

## 2021-11-19 ENCOUNTER — HOSPITAL ENCOUNTER (EMERGENCY)
Facility: HOSPITAL | Age: 66
Discharge: HOME | End: 2021-11-19
Attending: EMERGENCY MEDICINE
Payer: MEDICARE

## 2021-11-19 ENCOUNTER — APPOINTMENT (OUTPATIENT)
Dept: CT IMAGING | Facility: HOSPITAL | Age: 66
End: 2021-11-19
Payer: MEDICARE

## 2021-11-19 VITALS
SYSTOLIC BLOOD PRESSURE: 123 MMHG | TEMPERATURE: 98.8 F | WEIGHT: 135 LBS | DIASTOLIC BLOOD PRESSURE: 73 MMHG | OXYGEN SATURATION: 92 % | HEART RATE: 82 BPM | BODY MASS INDEX: 28.34 KG/M2 | HEIGHT: 58 IN | RESPIRATION RATE: 16 BRPM

## 2021-11-19 DIAGNOSIS — R51.9 ACUTE NONINTRACTABLE HEADACHE, UNSPECIFIED HEADACHE TYPE: Primary | ICD-10-CM

## 2021-11-19 LAB — SARS-COV-2, NAAT: NOT DETECTED

## 2021-11-19 PROCEDURE — 96374 THER/PROPH/DIAG INJ IV PUSH: CPT

## 2021-11-19 PROCEDURE — 87635 SARS-COV-2 COVID-19 AMP PRB: CPT

## 2021-11-19 PROCEDURE — 6360000002 HC RX W HCPCS: Performed by: EMERGENCY MEDICINE

## 2021-11-19 PROCEDURE — 99282 EMERGENCY DEPT VISIT SF MDM: CPT

## 2021-11-19 PROCEDURE — 2580000003 HC RX 258: Performed by: EMERGENCY MEDICINE

## 2021-11-19 PROCEDURE — 96375 TX/PRO/DX INJ NEW DRUG ADDON: CPT

## 2021-11-19 PROCEDURE — 70450 CT HEAD/BRAIN W/O DYE: CPT

## 2021-11-19 RX ORDER — 0.9 % SODIUM CHLORIDE 0.9 %
1000 INTRAVENOUS SOLUTION INTRAVENOUS ONCE
Status: COMPLETED | OUTPATIENT
Start: 2021-11-19 | End: 2021-11-19

## 2021-11-19 RX ORDER — METOCLOPRAMIDE HYDROCHLORIDE 5 MG/ML
5 INJECTION INTRAMUSCULAR; INTRAVENOUS ONCE
Status: COMPLETED | OUTPATIENT
Start: 2021-11-19 | End: 2021-11-19

## 2021-11-19 RX ORDER — DIPHENHYDRAMINE HYDROCHLORIDE 50 MG/ML
25 INJECTION INTRAMUSCULAR; INTRAVENOUS ONCE
Status: COMPLETED | OUTPATIENT
Start: 2021-11-19 | End: 2021-11-19

## 2021-11-19 RX ORDER — M-VIT,TX,IRON,MINS/CALC/FOLIC 27MG-0.4MG
1 TABLET ORAL DAILY
COMMUNITY

## 2021-11-19 RX ADMIN — DIPHENHYDRAMINE HYDROCHLORIDE 25 MG: 50 INJECTION, SOLUTION INTRAMUSCULAR; INTRAVENOUS at 19:42

## 2021-11-19 RX ADMIN — METOCLOPRAMIDE 5 MG: 5 INJECTION, SOLUTION INTRAMUSCULAR; INTRAVENOUS at 19:42

## 2021-11-19 RX ADMIN — SODIUM CHLORIDE 1000 ML: 9 INJECTION, SOLUTION INTRAVENOUS at 19:41

## 2021-11-19 ASSESSMENT — PAIN SCALES - GENERAL: PAINLEVEL_OUTOF10: 8

## 2021-11-20 NOTE — ED PROVIDER NOTES
[address]  eMERGENCY dEPARTMENT eNCOUnter      Pt Name: Radha Mckeon  MRN: [id number]  T[place]etgfurt: 1955  Date of evaluation: 34/20/4414  Provider: Madison Valdez MD    CHIEF COMPLAINT       Chief Complaint   Patient presents with    Headache     Onset at 5:30,had diarrhea this AM.         HISTORY OF PRESENT ILLNESS  (Location/Symptom, Timing/Onset, Context/Setting, Quality, Duration,Modifying Factors, Severity.)   Radha Mckeon is a 72 y.o. female who presents to the emergency department with the onset of a headache about an hour ago after smoking a cigarette around 5:30pm. Pain is right between her eyes. No vision changes from her baseline but she has some chronic vision issues. She has not taken anything for the headache yet. She also complains of sinus congestion and white drainage for the last few days. + cough 3-4 days ago with mild shortness of breath. No known exposures to COVID. Her son and  had [place] about 2 months ago. She is vaccinated for COVID. Nursing notes were reviewed. REVIEW OF SYSTEMS    (2-9 systems for level 4, 10 or more for level 5)   ROS:  General:  No fevers, no chills, no weakness  Cardiovascular:  No chest pain, no palpitations  Respiratory:  + shortness of breath, + cough, no wheezing  Gastrointestinal:  No pain, no nausea, no vomiting, + diarrhea  Musculoskeletal:  No muscle pain, no joint pain  Skin:  No rash, no easy bruising  Neurologic:  No speech problems, + headache, no extremity numbness, no extremity  tingling, no extremity weakness  Psychiatric:  No anxiety  Genitourinary:  No dysuria, no hematuria    Except as noted above the remainder of the review of systems was reviewed and negative.        PAST MEDICAL HISTORY     Past Medical History:   Diagnosis Date    Arthritis     Cerebral artery occlusion with cerebral infarction [facility]) 2003    GERD (gastroesophageal reflux disease)     Hyperlipidemia     Scoliosis     Thyroid disease          SURGICAL HISTORY       Past Surgical History:   Procedure Laterality Date    CYST REMOVAL Left     breast    OVARY REMOVAL      TUBAL LIGATION           CURRENT MEDICATIONS       Previous Medications    ASPIRIN 325 MG TABLET    Take 325 mg by mouth daily    ATORVASTATIN (LIPITOR) 20 MG TABLET    Take 20 mg by mouth nightly    CETIRIZINE (ZYRTEC) 10 MG TABLET    Take 10 mg by mouth daily    LEVOTHYROXINE (SYNTHROID) 50 MCG TABLET    Take 50 mcg by mouth Daily    MULTIPLE VITAMINS-MINERALS (THERAPEUTIC MULTIVITAMIN-MINERALS) TABLET    Take 1 tablet by mouth daily    NAPROXEN (NAPROSYN) 500 MG TABLET    Take 1 tablet by mouth 2 times daily as needed for Pain    OMEPRAZOLE (PRILOSEC) 20 MG CAPSULE    Take 20 mg by mouth daily       ALLERGIES     Patient has no known allergies. FAMILY HISTORY       Family History   Problem Relation Age of Onset    Other Mother     Diabetes Father           SOCIAL HISTORY       Social History     Socioeconomic History    Marital status:      Spouse name: None    Number of children: None    Years of education: None    Highest education level: None   Occupational History    None   Tobacco Use    Smoking status: Current Every Day Smoker     Packs/day: 1.00     Years: 38.00     Pack years: 38.00     Types: Cigarettes    Smokeless tobacco: Never Used   Substance and Sexual Activity    Alcohol use: No    Drug use: No    Sexual activity: None   Other Topics Concern    None   Social History Narrative    None     Social Determinants of Health     Financial Resource Strain:     Difficulty of Paying Living Expenses: Not on file   Food Insecurity:     Worried About Running Out of Food in the Last Year: Not on file    Mine of Food in the Last Year: Not on file   Transportation Needs:     Lack of Transportation (Medical): Not on file    Lack of Transportation (Non-Medical):  Not on file   Physical Activity:     Days of Exercise per Week: Not on file    Minutes of Exercise per Session: Not on file   Stress:     Feeling of Stress : Not on file   Social Connections:     Frequency of Communication with Friends and Family: Not on file    Frequency of Social Gatherings with Friends and Family: Not on file    Attends [employer] Services: Not on file    Active Member of [address] [place] [address] [employer] or Organizations: Not on file    Attends Club or Organization Meetings: Not on file    Marital Status: Not on file   Intimate Partner Violence:     Fear of Current or Ex-Partner: Not on file    Emotionally Abused: Not on file    Physically Abused: Not on file    Sexually Abused: Not on file   Housing Stability:     Unable to Pay for Housing in the Last Year: Not on file    Number of [place] in the Last Year: Not on file    Unstable Housing in the Last Year: Not on file         PHYSICAL EXAM    (up to 7 for level 4, 8 or more for level 5)     ED Triage Vitals   BP Temp Temp src Pulse Resp SpO2 Height Weight   -- -- -- -- -- -- -- --       Physical Exam  General :Patient is awake, alert, oriented, in no acute distress, nontoxic appearing  HEENT: Pupils are equally round and reactive to light, EOMI. Oral mucosa is moist, no exudate. No pharyngeal erythema. Neck: Neck is supple, full range of motion  Cardiac: Heart regular rate, rhythm, no murmurs, rubs, or gallops  Lungs: Lungs are clear to auscultation, there is no wheezing, rhonchi, or rales. Chest wall: There is no tenderness to palpation over the chest wall or over ribs  Abdomen: Abdomen is soft, nontender, nondistended. There is no firm or pulsatile masses, no rebound, rigidity or guarding. Musculoskeletal: 5 out of 5 strength in all 4 extremities. No focal muscle deficits are appreciated  Back: No midline or bony tenderness. No CVAT. Neuro:  Motor intact, sensory intact, level of consciousness is normal.  Dermatology: Skin is warm and dry  Psych: Mentation is grossly normal,cognition is grossly normal. Affect is appropriate. DIAGNOSTIC RESULTS     EKG: All EKG's are interpreted by [facility] Physician who either signs or Co-signs this chart in the absence of a cardiologist.      RADIOLOGY:   Non-plain film images such as CT, Ultrasound and MRI are read by the radiologist. Plain radiographic images are visualized and preliminarily interpreted by the emergency physician with the below findings:      []Radiologist's Report Reviewed:  CT HEAD WO CONTRAST   Final Result      No acute intracranial hemorrhage or mass effect. ED BEDSIDE ULTRASOUND:   Performed by ED Physician - none    LABS:  Labs Reviewed   COVID-19, RAPID    Narrative:     Performed at:  [address] [place] [address] Laboratory  [address] [place] [address],  [facility], [place] [address]   Phone [phone]       I have reviewed and interpreted all ofthe currently available lab results from this visit (if applicable):  Results for orders placed or performed during the hospital encounter of 11/19/21   COVID-19, Rapid    Specimen: Nasopharyngeal Swab   Result Value Ref Range    SARS-CoV-2, NAAT Not Detected Not Detected        All other labs were within normal range or not returned as of this dictation. EMERGENCY DEPARTMENT COURSE and DIFFERENTIAL DIAGNOSIS/MDM:   Vitals:  Vitals:    11/19/21 1935 11/19/21 1945   BP: (!) 148/84 138/85   Pulse: 82    Resp: 16    Temp: 98.8 °F (37.1 °C)    TempSrc: Oral    SpO2: 94% 95%   Weight: 135 lb (61.2 kg)    Height: 4' 10\" (1.473 m)        Head CT is negative. Patient feeling better after meds were given. BP came down without any intervention. Will discharge to home. The patient will follow-up with their PCP in 1-2 days for reevaluation. If the patient or family members have any further concerns or any worsening symptoms they will return to the ED for reevaluation.          CONSULTS:  None    PROCEDURES:  Procedures    CRITICAL CARE TIME    Total Critical Care time was 0 minutes, excluding separately reportable procedures. There was a high probability of clinically significant/life threatening deterioration in the patient's condition which required my urgent intervention. FINAL IMPRESSION      1. Acute nonintractable headache, unspecified headache type          DISPOSITION/PLAN   DISPOSITION Decision To Discharge 11/19/2021 08:19:35 PM      PATIENT REFERRED TO:  Stephy MARKHAM [address]  [phone]    Schedule an appointment as soon as possible for a visit in 2 days  As needed      DISCHARGE MEDICATIONS:  New Prescriptions    No medications on file       Comment: Please note this report has been produced using speech recognition software and may contain errorsrelated to that system including errors in grammar, punctuation, and spelling, as well as words and phrases that may be inappropriate. If there are any questions or concerns please feel free to contact the dictating providerfor clarification.     Fahad Keita MD  Attending Emergency Physician                Fahad Keita MD  11/19/21 8629

## 2022-08-05 ENCOUNTER — HOSPITAL ENCOUNTER (OUTPATIENT)
Dept: GENERAL RADIOLOGY | Facility: HOSPITAL | Age: 67
Discharge: HOME | End: 2022-08-05
Payer: MEDICARE

## 2022-08-05 ENCOUNTER — HOSPITAL ENCOUNTER (OUTPATIENT)
Dept: MAMMOGRAPHY | Facility: HOSPITAL | Age: 67
Discharge: HOME | End: 2022-08-05
Payer: MEDICARE

## 2022-08-05 VITALS — WEIGHT: 128 LBS | HEIGHT: 55 IN | BODY MASS INDEX: 29.62 KG/M2

## 2022-08-05 DIAGNOSIS — Z12.31 BREAST CANCER SCREENING BY MAMMOGRAM: ICD-10-CM

## 2022-08-05 DIAGNOSIS — Z78.0 POST-MENOPAUSAL: ICD-10-CM

## 2022-08-05 PROCEDURE — 77080 DXA BONE DENSITY AXIAL: CPT

## 2022-08-05 PROCEDURE — 77063 BREAST TOMOSYNTHESIS BI: CPT

## 2022-12-05 ENCOUNTER — TELEPHONE (OUTPATIENT)
Dept: SURGERY | Facility: CLINIC | Age: 67
End: 2022-12-05

## 2023-06-12 ENCOUNTER — OFFICE VISIT (OUTPATIENT)
Dept: GASTROENTEROLOGY | Facility: CLINIC | Age: 68
End: 2023-06-12
Payer: MEDICARE

## 2023-06-12 ENCOUNTER — TELEPHONE (OUTPATIENT)
Dept: GASTROENTEROLOGY | Facility: CLINIC | Age: 68
End: 2023-06-12

## 2023-06-12 ENCOUNTER — LAB (OUTPATIENT)
Dept: LAB | Facility: HOSPITAL | Age: 68
End: 2023-06-12
Payer: MEDICARE

## 2023-06-12 VITALS
HEART RATE: 60 BPM | HEIGHT: 56 IN | DIASTOLIC BLOOD PRESSURE: 86 MMHG | SYSTOLIC BLOOD PRESSURE: 124 MMHG | WEIGHT: 123 LBS | TEMPERATURE: 97.1 F | OXYGEN SATURATION: 95 % | BODY MASS INDEX: 27.67 KG/M2

## 2023-06-12 DIAGNOSIS — R79.89 ELEVATED LIVER FUNCTION TESTS: ICD-10-CM

## 2023-06-12 DIAGNOSIS — R76.8 HEPATITIS B CORE ANTIBODY POSITIVE: ICD-10-CM

## 2023-06-12 DIAGNOSIS — K59.00 CONSTIPATION, UNSPECIFIED CONSTIPATION TYPE: Chronic | ICD-10-CM

## 2023-06-12 DIAGNOSIS — Z12.11 ENCOUNTER FOR SCREENING FOR MALIGNANT NEOPLASM OF COLON: ICD-10-CM

## 2023-06-12 DIAGNOSIS — R79.89 ELEVATED LIVER FUNCTION TESTS: Chronic | ICD-10-CM

## 2023-06-12 DIAGNOSIS — E78.5 HYPERLIPIDEMIA, UNSPECIFIED HYPERLIPIDEMIA TYPE: ICD-10-CM

## 2023-06-12 DIAGNOSIS — K21.9 GASTROESOPHAGEAL REFLUX DISEASE WITHOUT ESOPHAGITIS: Chronic | ICD-10-CM

## 2023-06-12 DIAGNOSIS — Z11.59 ENCOUNTER FOR SCREENING FOR OTHER VIRAL DISEASES: ICD-10-CM

## 2023-06-12 DIAGNOSIS — K76.0 FATTY (CHANGE OF) LIVER, NOT ELSEWHERE CLASSIFIED: Chronic | ICD-10-CM

## 2023-06-12 DIAGNOSIS — K76.0 FATTY (CHANGE OF) LIVER, NOT ELSEWHERE CLASSIFIED: Primary | Chronic | ICD-10-CM

## 2023-06-12 DIAGNOSIS — E66.3 OVERWEIGHT WITH BODY MASS INDEX (BMI) OF 27 TO 27.9 IN ADULT: Chronic | ICD-10-CM

## 2023-06-12 DIAGNOSIS — Z12.11 ENCOUNTER FOR SCREENING FOR MALIGNANT NEOPLASM OF COLON: Primary | ICD-10-CM

## 2023-06-12 LAB
ALBUMIN SERPL-MCNC: 3.4 G/DL (ref 3.5–5.2)
ALBUMIN/GLOB SERPL: 1.1 G/DL
ALP SERPL-CCNC: 117 U/L (ref 39–117)
ALPHA1 GLOB MFR UR ELPH: 167 MG/DL (ref 90–200)
ALT SERPL W P-5'-P-CCNC: 24 U/L (ref 1–33)
ANION GAP SERPL CALCULATED.3IONS-SCNC: 13.6 MMOL/L (ref 5–15)
AST SERPL-CCNC: 19 U/L (ref 1–32)
BILIRUB SERPL-MCNC: 0.2 MG/DL (ref 0–1.2)
BUN SERPL-MCNC: 11 MG/DL (ref 8–23)
BUN/CREAT SERPL: 15.7 (ref 7–25)
CALCIUM SPEC-SCNC: 8.8 MG/DL (ref 8.6–10.5)
CHLORIDE SERPL-SCNC: 111 MMOL/L (ref 98–107)
CO2 SERPL-SCNC: 18.4 MMOL/L (ref 22–29)
CREAT SERPL-MCNC: 0.7 MG/DL (ref 0.57–1)
DEPRECATED RDW RBC AUTO: 55.8 FL (ref 37–54)
EGFRCR SERPLBLD CKD-EPI 2021: 94.9 ML/MIN/1.73
ERYTHROCYTE [DISTWIDTH] IN BLOOD BY AUTOMATED COUNT: 16.5 % (ref 12.3–15.4)
FERRITIN SERPL-MCNC: 71.5 NG/ML (ref 13–150)
GLOBULIN UR ELPH-MCNC: 3.1 GM/DL
GLUCOSE SERPL-MCNC: 67 MG/DL (ref 65–99)
HBV SURFACE AG SERPL QL IA: NORMAL
HCT VFR BLD AUTO: 41.6 % (ref 34–46.6)
HCV AB SER DONR QL: NORMAL
HGB BLD-MCNC: 13.4 G/DL (ref 12–15.9)
INR PPP: 1.02 (ref 0.9–1.1)
IRON 24H UR-MRATE: 89 MCG/DL (ref 37–145)
IRON SATN MFR SERPL: 28 % (ref 20–50)
MCH RBC QN AUTO: 29.9 PG (ref 26.6–33)
MCHC RBC AUTO-ENTMCNC: 32.2 G/DL (ref 31.5–35.7)
MCV RBC AUTO: 92.9 FL (ref 79–97)
PLATELET # BLD AUTO: 342 10*3/MM3 (ref 140–450)
PMV BLD AUTO: 11.5 FL (ref 6–12)
POTASSIUM SERPL-SCNC: 4 MMOL/L (ref 3.5–5.2)
PROT SERPL-MCNC: 6.5 G/DL (ref 6–8.5)
PROTHROMBIN TIME: 13.9 SECONDS (ref 12.3–15.1)
RBC # BLD AUTO: 4.48 10*6/MM3 (ref 3.77–5.28)
SODIUM SERPL-SCNC: 143 MMOL/L (ref 136–145)
TIBC SERPL-MCNC: 323 MCG/DL (ref 298–536)
TRANSFERRIN SERPL-MCNC: 217 MG/DL (ref 200–360)
WBC NRBC COR # BLD: 6.05 10*3/MM3 (ref 3.4–10.8)

## 2023-06-12 PROCEDURE — 1159F MED LIST DOCD IN RCRD: CPT | Performed by: NURSE PRACTITIONER

## 2023-06-12 PROCEDURE — 86015 ACTIN ANTIBODY EACH: CPT

## 2023-06-12 PROCEDURE — 82728 ASSAY OF FERRITIN: CPT

## 2023-06-12 PROCEDURE — 83883 ASSAY NEPHELOMETRY NOT SPEC: CPT

## 2023-06-12 PROCEDURE — 82947 ASSAY GLUCOSE BLOOD QUANT: CPT

## 2023-06-12 PROCEDURE — 85610 PROTHROMBIN TIME: CPT

## 2023-06-12 PROCEDURE — 83540 ASSAY OF IRON: CPT

## 2023-06-12 PROCEDURE — 36415 COLL VENOUS BLD VENIPUNCTURE: CPT

## 2023-06-12 PROCEDURE — 86803 HEPATITIS C AB TEST: CPT

## 2023-06-12 PROCEDURE — 99204 OFFICE O/P NEW MOD 45 MIN: CPT | Performed by: NURSE PRACTITIONER

## 2023-06-12 PROCEDURE — 83010 ASSAY OF HAPTOGLOBIN QUANT: CPT

## 2023-06-12 PROCEDURE — 82103 ALPHA-1-ANTITRYPSIN TOTAL: CPT

## 2023-06-12 PROCEDURE — 1160F RVW MEDS BY RX/DR IN RCRD: CPT | Performed by: NURSE PRACTITIONER

## 2023-06-12 PROCEDURE — 86381 MITOCHONDRIAL ANTIBODY EACH: CPT

## 2023-06-12 PROCEDURE — 82465 ASSAY BLD/SERUM CHOLESTEROL: CPT

## 2023-06-12 PROCEDURE — 87340 HEPATITIS B SURFACE AG IA: CPT

## 2023-06-12 PROCEDURE — 80053 COMPREHEN METABOLIC PANEL: CPT

## 2023-06-12 PROCEDURE — 82247 BILIRUBIN TOTAL: CPT

## 2023-06-12 PROCEDURE — 86708 HEPATITIS A ANTIBODY: CPT

## 2023-06-12 PROCEDURE — 84478 ASSAY OF TRIGLYCERIDES: CPT

## 2023-06-12 PROCEDURE — 86038 ANTINUCLEAR ANTIBODIES: CPT

## 2023-06-12 PROCEDURE — 82977 ASSAY OF GGT: CPT

## 2023-06-12 PROCEDURE — 86704 HEP B CORE ANTIBODY TOTAL: CPT

## 2023-06-12 PROCEDURE — 86317 IMMUNOASSAY INFECTIOUS AGENT: CPT

## 2023-06-12 PROCEDURE — 84466 ASSAY OF TRANSFERRIN: CPT

## 2023-06-12 PROCEDURE — 82172 ASSAY OF APOLIPOPROTEIN: CPT

## 2023-06-12 PROCEDURE — 85027 COMPLETE CBC AUTOMATED: CPT

## 2023-06-12 RX ORDER — BISACODYL 5 MG/1
TABLET, DELAYED RELEASE ORAL
Qty: 4 TABLET | Refills: 0 | Status: SHIPPED | OUTPATIENT
Start: 2023-06-12

## 2023-06-12 RX ORDER — ERGOCALCIFEROL 1.25 MG/1
CAPSULE ORAL
COMMUNITY
Start: 2023-05-04

## 2023-06-12 RX ORDER — ASPIRIN 325 MG
TABLET ORAL EVERY 24 HOURS
COMMUNITY

## 2023-06-12 RX ORDER — OXYBUTYNIN CHLORIDE 5 MG/1
TABLET, EXTENDED RELEASE ORAL
COMMUNITY
Start: 2023-05-04

## 2023-06-12 RX ORDER — SODIUM CHLORIDE 9 MG/ML
70 INJECTION, SOLUTION INTRAVENOUS CONTINUOUS PRN
OUTPATIENT
Start: 2023-06-12

## 2023-06-12 RX ORDER — IBUPROFEN 800 MG/1
TABLET ORAL
COMMUNITY

## 2023-06-12 RX ORDER — PEG-3350, SODIUM SULFATE, SODIUM CHLORIDE, POTASSIUM CHLORIDE, SODIUM ASCORBATE AND ASCORBIC ACID 7.5-2.691G
1000 KIT ORAL TAKE AS DIRECTED
Qty: 1 EACH | Refills: 0 | Status: SHIPPED | OUTPATIENT
Start: 2023-06-12

## 2023-06-12 RX ORDER — TOPIRAMATE 25 MG/1
25 TABLET ORAL 2 TIMES DAILY
COMMUNITY

## 2023-06-12 NOTE — TELEPHONE ENCOUNTER
----- Message from Carolina Harvey MA sent at 6/12/2023 10:47 AM EDT -----  Pharmacy called today regarding the Moviprep. States that they are unable to get that one for a while. Requesting something different to be sent

## 2023-06-12 NOTE — PATIENT INSTRUCTIONS
Antireflux measures: Avoid fried, fatty foods, alcohol, chocolate, coffee, tea,  soft drinks, peppermint and spearmint, spicy foods, tomatoes and tomato based foods, onion based foods, and smoking.  Other antireflux measures include weight reduction if overweight, avoiding tight clothing around the abdomen, elevating the head of the bed 6 inches with blocks under the head board, and don't drink or eat before going to bed and avoid lying down immediately after meals.  Omeprazole 20 mg 1 po daily as needed for reflux.  High fiber, low fat diet with liberal water intake.   Advised to increase activity.   Advised to lose 5 pounds in the next 6-12 months.  Labs  Colonoscopy: The indications, technique, alternatives and potential risk and complications were discussed with the patient including but not limited to bleeding, perforations, missing lesions and anesthetic complications. The patient understands and wishes to proceed with the procedure and has given their verbal consent. Written patient education information was given to the patient.   The patient will call if they have further questions before procedure.

## 2023-06-12 NOTE — PROGRESS NOTES
"     New Patient Consult      Date: 2023   Patient Name: Nicole Mack  MRN: [id number]  : 1955     Primary Care Provider: Kayla Lopez DO    Chief Complaint   Patient presents with    Abdominal Pain    Consult     History of Present Illness: Nicole Mack is a 67 y.o. female who is here today to establish care with gastroenterology for fatty liver.    She was having some \"kidney\" pain in her back and the patient had ultrasound and was told she had a fatty liver. There is no history of liver disease in the past. The patient denies IVDA, alcohol use, tattoos or blood transfusions. There is no family history of liver disease.     There is no history of abdominal pain, nausea or vomiting. She has a history of reflux that is reasonably controlled with Omeprazole 20 mg daily as needed. There is no history of difficulty swallowing. She has a history of constipation that is reasonably controlled with Miralax. Denies any GI bleeding.     Her last colonoscopy was over 10 years ago. She has not had EGD in the past. There is no family history of GI malignancy.     Subjective      Past Medical History:   Diagnosis Date    Arthritis of back     Disease of thyroid gland     Fatty liver     Fracture     LEFT PINKY TOE AT PRESENT TIME, RIGHT ANKLE WHEN A TEENAGER    GERD (gastroesophageal reflux disease)     H/O seasonal allergies     High cholesterol     [place] (hard of hearing)     REPORTS NO HEARING AIDS    Hyperlipidemia     Memory loss     REPORTS WAS RECENTLY DIAGNOSED BY PCP WITH HAVE MILD MEMORY LOSS, EARLY ONSET OF DEMENTIA    Osteoarthritis     Stroke syndrome     REPORTS CVA IN . REPORTS WEAKNESS IN EXTREMITIES FROM THIS.     Valgus deformity, not elsewhere classified, right knee     Wears glasses     Wears partial dentures     UPPER PLATE     Past Surgical History:   Procedure Laterality Date    BREAST CYST EXCISION Left     REPORTS EARLINE, REPORTS NO RESTRICTIONS ON LUE    CATARACT " EXTRACTION W/ INTRAOCULAR LENS IMPLANT Left 07/27/2017    Procedure: CATARACT PHACO EXTRACTION WITH INTRAOCULAR LENS IMPLANT LEFT;  Surgeon: Jw Mansfield MD;  Location: [facility] OR;  Service:     CATARACT EXTRACTION W/ INTRAOCULAR LENS IMPLANT Right 08/17/2017    Procedure: CATARACT PHACO EXTRACTION WITH INTRAOCULAR LENS IMPLANT RIGHT;  Surgeon: Jw Mansfield MD;  Location: [facility] OR;  Service:     COLONOSCOPY      >10 years ago    MOUTH SURGERY      ORAL EXTRACTIONS    OOPHORECTOMY Left     FL ARTHRP KNE CONDYLE&PLATU MEDIAL&LAT COMPARTMENTS Left 10/10/2017    Procedure: ELECTIVE LEFT TOTAL KNEE ARTHROPLASTY (S&N);  Surgeon: Jerrell Britt MD;  Location: [facility] OR;  Service: Orthopedics    TOTAL KNEE ARTHROPLASTY Right 02/09/2016    KIRA Britt MD    TOTAL KNEE ARTHROPLASTY Left 10/10/2017    KIRA Britt MD    TUBAL ABDOMINAL LIGATION      US GUIDED CYST ASPIRATION BREAST N/A 11/20/2019     Family History   Problem Relation Age of Onset    Diabetes Father     Stroke Other     Colon cancer Neg Hx      Social History     Socioeconomic History    Marital status:    Tobacco Use    Smoking status: Every Day     Packs/day: 1.00     Years: 42.00     Pack years: 42.00     Types: Cigarettes    Smokeless tobacco: Never   Vaping Use    Vaping Use: Never used   Substance and Sexual Activity    Alcohol use: No    Drug use: No    Sexual activity: Defer       Current Outpatient Medications:     aspirin 325 MG tablet, Daily., Disp: , Rfl:     atorvastatin (LIPITOR) 20 MG tablet, Take 1 tablet by mouth Daily., Disp: , Rfl:     cetirizine (zyrTEC) 10 MG tablet, Take 1 tablet by mouth Daily., Disp: , Rfl:     cyanocobalamin 1000 MCG/ML injection, Inject 1 mL into the appropriate muscle as directed by prescriber Every 28 (Twenty-Eight) Days., Disp: , Rfl:     ergocalciferol (ERGOCALCIFEROL) 1.25 MG (47380 UT) capsule, Vitamin D2 1,250 mcg (50,000 unit) capsule, Disp: , Rfl:     ibuprofen (ADVIL,MOTRIN) 800 MG tablet, ibuprofen  800 mg tablet  TAKE 1 TABLET BY MOUTH THREE TIMES DAILY WITH FOOD, Disp: , Rfl:     levothyroxine (SYNTHROID, LEVOTHROID) 75 MCG tablet, Take 1 tablet by mouth Daily., Disp: , Rfl:     meloxicam (MOBIC) 15 MG tablet, Take 1 tablet by mouth Daily., Disp: , Rfl: 2    omeprazole (priLOSEC) 20 MG capsule, Take 1 capsule by mouth Every Morning., Disp: , Rfl:     oxybutynin XL (DITROPAN-XL) 5 MG 24 hr tablet, oxybutynin chloride ER 5 mg tablet,extended release 24 hr  TAKE 1 TABLET BY MOUTH EVERY DAY FOR URINARY INCONTINENCE, Disp: , Rfl:     topiramate (TOPAMAX) 25 MG tablet, Take 1 tablet by mouth 2 (Two) Times a Day., Disp: , Rfl:     PEG-KCl-NaCl-NaSulf-Na Asc-C (MOVIPREP) 100 g reconstituted solution powder, Take 1,000 mL by mouth Take As Directed. Take as directed for colonoscopy prep., Disp: 1 each, Rfl: 0     Allergies   Allergen Reactions    Bee Venom Swelling    Levofloxacin Hives     The following portions of the patient's history were reviewed and updated as appropriate: allergies, current medications, past family history, past medical history, past social history, past surgical history and problem list.    Objective     Physical Exam  Vitals and nursing note reviewed.   Constitutional:       General: She is not in acute distress.     Appearance: Normal appearance. She is well-developed.   HENT:      Head: Normocephalic and atraumatic.      Mouth/Throat:      Mouth: Mucous membranes are not pale, not dry and not cyanotic.   Eyes:      General: Lids are normal.   Neck:      Trachea: Trachea normal.   Cardiovascular:      Rate and Rhythm: Normal rate.   Pulmonary:      Effort: Pulmonary effort is normal. No respiratory distress.      Breath sounds: Normal breath sounds.   Abdominal:      General: Bowel sounds are normal.      Palpations: Abdomen is soft. There is no mass.      Tenderness: There is no abdominal tenderness.      Hernia: No hernia is present.   Skin:     General: Skin is warm and dry.  "  Neurological:      Mental Status: She is alert and oriented to person, place, and time.   Psychiatric:         Mood and Affect: Mood normal.         Speech: Speech normal.         Behavior: Behavior normal. Behavior is cooperative.     Vitals:    06/12/23 0911   BP: 124/86   Pulse: 60   Temp: 97.1 °F (36.2 °C)   SpO2: 95%   Weight: 55.8 kg (123 lb)   Height: 142.2 cm (56\")     Body mass index is 27.58 kg/m².     Results Review:   I have reviewed the patient's new clinical and imaging results.    No visits with results within 90 Day(s) from this visit.   Latest known visit with results is:      Dated 3/22/2023 hemoglobin 14.2 hematocrit 46.8 platelet count 267 albumin 4.2 total bilirubin 0.2 AST 30.5 ALT 29.2 alkaline phosphatase 191.2 total cholesterol 108 triglycerides 185.7 TSH 3.03, vitamin B12 1312, vitamin D 92.4    Abdominal ultrasound     5/19/2023  Mild to moderate fatty liver.  No acute abdominal findings as visualized by sonography.     Assessment / Plan      1. Fatty (change of) liver, not elsewhere classified  2. Elevated liver function tests  3. Overweight with body mass index (BMI) of 27 to 27.9 in adult  BMI 27.58  Patient had abdominal ultrasound and incidental finding of fatty liver was noted.  Labs dated 3/22/2023 with alkaline phosphatase elevated at 191.2.  Normal AST/ALT/total bilirubin.  Total cholesterol normal.  Triglycerides elevated at 185.7.  Advise low-fat diet, increased activity and weight reduction.  Labs    - CBC (No Diff); Future  - Comprehensive Metabolic Panel; Future  - Protime-INR; Future  - Hepatitis A Antibody, Total; Future  - Hepatitis B Surf Antibody Quant; Future  - Hepatitis B Surface Antigen; Future  - Hepatitis B Core Antibody, Total; Future  - Hepatitis C Antibody; Future  - Iron Profile; Future  - Ferritin; Future  - LEENA; Future  - Anti-Smooth Muscle Antibody Titer; Future  - Mitochondrial Antibodies, M2; Future  - DUMONT Fibrosure Plus; Future  - Alpha - 1 - " Antitrypsin; Future    4. Gastroesophageal reflux disease without esophagitis  She has a history of reflux that is reasonably controlled with omeprazole 20 mg daily as needed.  There is no history of difficulty swallowing.  No nausea or vomiting.  Antireflux measures.  Continue omeprazole 20 mg daily as needed.    5. Constipation, unspecified constipation type  She has a history of constipation for most of her life that is reasonably controlled with MiraLAX daily as needed.  Patient denies abdominal pain.  There is no history of rectal bleeding.  TSH normal.  High-fiber, low-fat diet with liberal water intake.  Continue MiraLAX daily as needed.    6. Encounter for screening for malignant neoplasm of colon  Her last colonoscopy was over 10 years ago.  There is no family history of colon cancer.  Colonoscopy for colon cancer screening.    - Case Request  - PEG-KCl-NaCl-NaSulf-Na Asc-C (MOVIPREP) 100 g reconstituted solution powder; Take 1,000 mL by mouth Take As Directed. Take as directed for colonoscopy prep.  Dispense: 1 each; Refill: 0    Patient Instructions   Antireflux measures: Avoid fried, fatty foods, alcohol, chocolate, coffee, tea,  soft drinks, peppermint and spearmint, spicy foods, tomatoes and tomato based foods, onion based foods, and smoking.  Other antireflux measures include weight reduction if overweight, avoiding tight clothing around the abdomen, elevating the head of the bed 6 inches with blocks under the head board, and don't drink or eat before going to bed and avoid lying down immediately after meals.  Omeprazole 20 mg 1 po daily as needed for reflux.  High fiber, low fat diet with liberal water intake.   Advised to increase activity.   Advised to lose 5 pounds in the next 6-12 months.  Labs  Colonoscopy: The indications, technique, alternatives and potential risk and complications were discussed with the patient including but not limited to bleeding, perforations, missing lesions and  anesthetic complications. The patient understands and wishes to proceed with the procedure and has given their verbal consent. Written patient education information was given to the patient.   The patient will call if they have further questions before procedure.    Raven Somers, APRN  6/12/2023    Please note that portions of this note may have been completed with a voice recognition program.

## 2023-06-13 LAB
ANA SER QL: NEGATIVE
HAV AB SER QL IA: NEGATIVE
HBV CORE AB SERPL QL IA: POSITIVE
HBV SURFACE AB SER-ACNC: 6.6 MIU/ML
MITOCHONDRIA M2 IGG SER-ACNC: <20 UNITS (ref 0–20)
SMA IGG SER-ACNC: 12 UNITS (ref 0–19)

## 2023-06-14 LAB
A2 MACROGLOB SERPL-MCNC: 155 MG/DL (ref 110–276)
ALT SERPL W P-5'-P-CCNC: 25 IU/L (ref 0–40)
APO A-I SERPL-MCNC: 101 MG/DL (ref 116–209)
AST SERPL W P-5'-P-CCNC: 21 IU/L (ref 0–40)
BILIRUB SERPL-MCNC: 0.2 MG/DL (ref 0–1.2)
CHOLEST SERPL-MCNC: 90 MG/DL (ref 100–199)
FIBROSIS SCORING:: ABNORMAL
FIBROSIS STAGE SERPL QL: ABNORMAL
GGT SERPL-CCNC: 12 IU/L (ref 0–60)
GLUCOSE SERPL-MCNC: 74 MG/DL (ref 70–99)
HAPTOGLOB SERPL-MCNC: 256 MG/DL (ref 37–355)
LABORATORY COMMENT REPORT: ABNORMAL
LIVER FIBR SCORE SERPL CALC.FIBROSURE: 0.07 (ref 0–0.21)
LIVER STEATOSIS GRADE SERPL QL: ABNORMAL
LIVER STEATOSIS SCORE SERPL: 0.52 (ref 0–0.4)
NASH GRADE SERPL QL: ABNORMAL
NASH INTERPRETATION SERPL-IMP: ABNORMAL
NASH SCORE SERPL: 0.49 (ref 0–0.25)
NASH SCORING: ABNORMAL
STEATOSIS SCORING: ABNORMAL
TEST PERFORMANCE INFO SPEC: ABNORMAL
TEST PERFORMANCE INFO SPEC: ABNORMAL
TRIGL SERPL-MCNC: 160 MG/DL (ref 0–149)

## 2023-07-31 ENCOUNTER — TELEPHONE (OUTPATIENT)
Dept: GASTROENTEROLOGY | Facility: CLINIC | Age: 68
End: 2023-07-31
Payer: COMMERCIAL

## 2023-10-02 ENCOUNTER — TELEPHONE (OUTPATIENT)
Dept: GASTROENTEROLOGY | Facility: CLINIC | Age: 68
End: 2023-10-02
Payer: COMMERCIAL

## 2023-10-02 NOTE — TELEPHONE ENCOUNTER
1st attempt:  Called patient re: overdue lab orders.  Spoke with patient, reminded her about the active lab orders.

## 2024-04-06 NOTE — THERAPY TREATMENT NOTE
Acute Care - Physical Therapy Treatment Note  [facility]     Patient Name: Nicole Mack  : 1955  MRN: [id number]  Today's Date: 10/12/2017  Onset of Illness/Injury or Date of Surgery Date: 10/10/17  Date of Referral to PT: 10/10/17  Referring Physician: Dr. Britt    Admit Date: 10/10/2017    Visit Dx:    ICD-10-CM ICD-9-CM   1. Impaired functional mobility, balance, gait, and endurance Z74.09 V49.89   2. Primary osteoarthritis of left knee M17.12 715.16   3. Pre-op testing Z01.818 V72.84   4. Impaired mobility and ADLs Z74.09 799.89   5. S/P total knee replacement, left Z96.652 V43.65     Patient Active Problem List   Diagnosis   • Concentration deficit   • Microangiopathy   • Migraine without aura and without status migrainosus, not intractable   • B12 deficiency   • Pre-op testing   • Primary osteoarthritis of left knee   • OA (osteoarthritis) of knee               Adult Rehabilitation Note       10/12/17 0942 10/12/17 0824 10/11/17 1032    Rehab Assessment/Intervention    Discipline occupational therapist  -SD physical therapy assistant  -RM physical therapy assistant  -RM    Document Type therapy note (daily note)  -SD therapy note (daily note)  -RM therapy note (daily note)  -RM    Subjective Information agree to therapy;complains of;pain  -SD agree to therapy;complains of;weakness;pain  -RM agree to therapy;complains of;weakness;pain  -RM    Patient Effort, Rehab Treatment adequate  -SD adequate  -RM adequate  -RM    Symptoms Noted During/After Treatment none  -SD  increased pain  -RM    Precautions/Limitations  fall precautions  -RM fall precautions  -RM    Specific Treatment Considerations   2 lpm pnc   -RM    Recorded by [SD] Joanna Miller OT [RM] Freddie Vargas, PTA [RM] Freddie Vargas, PTA    Vital Signs    Pre SpO2 (%)   92  -RM    O2 Delivery Pre Treatment   supplemental O2  -RM    Intra SpO2 (%)   94  -RM    O2 Delivery Intra Treatment   supplemental O2  -RM    Post SpO2 (%)   95   -RM    O2 Delivery Post Treatment   supplemental O2  -RM    Pre Patient Position   Supine  -RM    Intra Patient Position   Standing  -RM    Post Patient Position   Sitting  -RM    Recorded by   [RM] Freddie Vargas PTA    Pain Assessment    Pain Assessment 0-10  -SD 0-10  -RM 0-10  -RM    Pain Score 2  -SD 2  -RM 7  -RM    Post Pain Score 2  -SD 2  -RM 8  -RM    Pain Type Acute pain;Surgical pain  -SD Acute pain;Surgical pain  -RM Acute pain;Surgical pain  -RM    Pain Location Knee  -SD Knee  -RM Knee  -RM    Pain Orientation Left  -SD Left  -RM Left  -RM    Pain Descriptors  Aching;Discomfort  -RM Aching;Discomfort  -RM    Pain Frequency  Constant/continuous  -RM Constant/continuous  -RM    Pain Onset  Ongoing  -RM Ongoing  -RM    Pain Intervention(s)  Repositioned;Ambulation/increased activity  -RM Repositioned;Ambulation/increased activity;Cold pack  -RM    Response to Interventions   tolerated,informed nurse   -RM    Recorded by [SD] Joanna Miller OT [RM] Freddie Vargas PTA [RM] Freddie Vargas PTA    Mobility Assessment/Training    Left Lower Extremity Weight-Bearing  weight-bearing as tolerated  -RM     Recorded by  [RM] Freddie Vargas PTA     Bed Mobility, Assessment/Treatment    Bed Mobility, Assistive Device   bed rails;head of bed elevated  -RM    Bed Mob, Supine to Sit, [place]   verbal cues required;minimum assist (75% patient effort)  -RM    Bed Mobility, Safety Issues   decreased use of arms for pushing/pulling;decreased use of legs for bridging/pushing  -RM    Bed Mobility, Impairments   ROM decreased;strength decreased;pain  -RM    Recorded by   [RM] Freddie Vargas PTA    Transfer Assessment/Treatment    Transfers, Sit-Stand [place]  verbal cues required;contact guard assist  -RM verbal cues required;contact guard assist  -RM    Transfers, Stand-Sit [place]  contact guard assist  -RM verbal cues required;contact guard assist  -RM    Transfers, Sit-Stand-Sit, Assist  Device  rolling walker  -RM rolling walker  -RM    Transfer, Safety Issues   weight-shifting ability decreased;sequencing ability decreased;steps too close front assistive device  -RM    Recorded by  [RM] Freddie Vargas PTA [RM] Freddie Vargas PTA    Gait Assessment/Treatment    Gait, [place] Level  verbal cues required;contact guard assist  -RM verbal cues required;contact guard assist  -RM    Gait, Assistive Device  rolling walker  -RM rolling walker  -RM    Gait, Distance (Feet)  144  -RM 88  -RM    Gait, Gait Pattern Analysis  swing-through gait  -RM swing-through gait  -RM    Gait, Gait Deviations  ailyn decreased;antalgic;left:;step length decreased;stride width increased;toe-to-floor clearance decreased;weight-shifting ability decreased  -RM antalgic;left:;decreased heel strike;step length decreased;toe-to-floor clearance decreased;weight-shifting ability decreased  -RM    Gait, Safety Issues  step length decreased;weight-shifting ability decreased  -RM     Gait, Impairments  strength decreased;pain  -RM     Recorded by  [RM] Freddie Vargas PTA [RM] Freddie Vargas PTA    Lower Body Dressing Assessment/Training    LB Dressing Assess/Train, Clothing Type doffing:;donning:;socks  -SD      LB Dressing Assess/Train, Assist Device reacher;sock-aid  -SD      LB Dressing Assess/Train, Position sitting  -SD      LB Dressing Assess/Train, [place] supervision required  -SD      Recorded by [SD] Joanna Miller OT      Therapy Exercises    Left Lower Extremity  AAROM:;15 reps;sitting;ankle pumps/circles;glut sets;heel slides;hip abduction/adduction;SAQ;quad sets;SLR  -RM AAROM:;10 reps;supine;ankle pumps/circles;glut sets;heel slides;hip abduction/adduction;quad sets;SAQ;SLR  -RM    Bilateral Upper Extremity AROM:;15 reps;sitting;elbow flexion/extension;shoulder abduction/adduction;shoulder extension/flexion  -SD      BUE Resistance theraband  -SD      Recorded by [SD] Joanna Miller OT [RM]  Freddie Vargas, PTA [RM] Freddie Vargas, PTA    Positioning and Restraints    Pre-Treatment Position sitting in chair/recliner  -SD sitting in chair/recliner  -RM in bed  -RM    Post Treatment Position chair  -SD chair  -RM chair  -RM    In Chair reclined;call light within reach;encouraged to call for assist  -SD reclined;call light within reach;encouraged to call for assist;notified nsg  -RM reclined;call light within reach;encouraged to call for assist;notified nsg;legs elevated  -RM    Recorded by [SD] Joanna Miller, OT [RM] Freddie Vargas, PTA [RM] Freddie Vargas, JEFE      User Key  (r) = Recorded By, (t) = Taken By, (c) = Cosigned By    Initials Name Effective Dates    RM Freddie Vargas, PTA 10/26/16 -     SD Joanna Miller, OT 06/30/17 -                 IP PT Goals       10/12/17 1459 10/11/17 1221 10/10/17 2022    Bed Mobility PT LTG    Bed Mobility PT LTG, Date Established   10/10/17  -JR    Bed Mobility PT LTG, Time to Achieve   2 wks  -JR    Bed Mobility PT LTG, Activity Type   all bed mobility  -JR    Bed Mobility PT LTG, [place] Level   contact guard assist  -JR    Bed Mobility PT LTG, Outcome  goal ongoing  -RM     Transfer Training PT LTG    Transfer Training PT LTG, Date Established   10/10/17  -JR    Transfer Training PT LTG, Time to Achieve   2 wks  -JR    Transfer Training PT LTG, Activity Type   sit to stand/stand to sit;bed to chair /chair to bed  -JR    Transfer Training PT LTG, [place] Level   contact guard assist  -JR    Transfer Training PT LTG, Assist Device   walker, rolling  -JR    Transfer Training PT LTG, Outcome  goal ongoing  -RM     Gait Training PT LTG    Gait Training Goal PT LTG, Date Established   10/10/17  -JR    Gait Training Goal PT LTG, Time to Achieve   2 wks  -JR    Gait Training Goal PT LTG, [place] Level   contact guard assist  -JR    Gait Training Goal PT LTG, Assist Device   walker, rolling  -JR    Gait Training Goal PT LTG, Distance to  Achieve   250  -JR    Gait Training Goal PT LTG, Outcome goal ongoing  -RM goal ongoing  -RM       User Key  (r) = Recorded By, (t) = Taken By, (c) = Cosigned By    Initials Name Provider Type    JR Gale Jones, PT Physical Therapist     Freddie Vargas PTA Physical Therapy Assistant          Physical Therapy Education     Title: PT OT SLP Therapies (Active)     Topic: Physical Therapy (Active)     Point: Mobility training (Done)    Learning Progress Summary    Learner Readiness Method Response Comment Documented by Status   Patient Acceptance E,D,H VU,DU,NR   10/12/17 1459 Done    Acceptance E,D VU,NR   10/11/17 1219 Done    Acceptance E VU Importance of mobility to recovery  10/10/17 2020 Done               Point: Home exercise program (Done)    Learning Progress Summary    Learner Readiness Method Response Comment Documented by Status   Patient Acceptance E,D,H VU,DU,NR   10/12/17 1459 Done    Acceptance E,D VU,NR   10/11/17 1219 Done                      User Key     Initials Effective Dates Name Provider Type Discipline     10/26/16 -  Gale Jones, PT Physical Therapist PT     10/26/16 -  Freddie Vargas PTA Physical Therapy Assistant PT                    PT Recommendation and Plan  Anticipated Discharge Disposition: home with home health  Planned Therapy Interventions: balance training, ROM (Range of Motion), strengthening, bed mobility training, transfer training, gait training, patient/family education  PT Frequency: 2 times/day  Plan of Care Review  Plan Of Care Reviewed With: patient  Progress: improving  Outcome Summary/Follow up Plan: Pt tolerated increased ther ex reps increased PROM as well as increased ambulation distance with improved gait pattern . See flowsheet for details.          Outcome Measures       10/12/17 0942 10/12/17 0824 10/11/17 1032    How much help from another person do you currently need...    Turning from your back to your side while in flat bed without using  bedrails?  3  -RM 3  -RM    Moving from lying on back to sitting on the side of a flat bed without bedrails?  3  -RM 3  -RM    Moving to and from a bed to a chair (including a wheelchair)?  3  -RM 3  -RM    Standing up from a chair using your arms (e.g., wheelchair, bedside chair)?  3  -RM 3  -RM    Climbing 3-5 steps with a railing?  3  -RM 2  -RM    To walk in hospital room?  3  -RM 3  -RM    AM-PAC 6 Clicks Score  18  -RM 17  -RM    How much help from another is currently needed...    Putting on and taking off regular lower body clothing? 3  -SD      Bathing (including washing, rinsing, and drying) 2  -SD      Toileting (which includes using toilet bed pan or urinal) 3  -SD      Putting on and taking off regular upper body clothing 4  -SD      Taking care of personal grooming (such as brushing teeth) 4  -SD      Eating meals 4  -SD      Score 20  -SD      Functional Assessment    Outcome Measure Options AM-PAC 6 Clicks Daily Activity (OT)  -SD AM-PAC 6 Clicks Basic Mobility (PT)  -RM AM-PAC 6 Clicks Basic Mobility (PT)  -RM      10/11/17 1024 10/10/17 1917       How much help from another person do you currently need...    Turning from your back to your side while in flat bed without using bedrails?  3  -JR     Moving from lying on back to sitting on the side of a flat bed without bedrails?  3  -JR     Moving to and from a bed to a chair (including a wheelchair)?  3  -JR     Standing up from a chair using your arms (e.g., wheelchair, bedside chair)?  3  -JR     Climbing 3-5 steps with a railing?  2  -JR     To walk in hospital room?  3  -JR     AM-PAC 6 Clicks Score  17  -JR     How much help from another is currently needed...    Putting on and taking off regular lower body clothing? 2  -SD      Bathing (including washing, rinsing, and drying) 2  -SD      Toileting (which includes using toilet bed pan or urinal) 3  -SD      Putting on and taking off regular upper body clothing 4  -SD      Taking care of personal  grooming (such as brushing teeth) 4  -SD      Eating meals 4  -SD      Score 19  -SD      Functional Assessment    Outcome Measure Options AM-PAC 6 Clicks Daily Activity (OT)  -SD AM-PAC 6 Clicks Basic Mobility (PT)  -JR       User Key  (r) = Recorded By, (t) = Taken By, (c) = Cosigned By    Initials Name Provider Type    JR Gale Jones, PT Physical Therapist    DEONDRE Vargas PTA Physical Therapy Assistant    ELADIO Miller, OT Occupational Therapist           Time Calculation:         PT Charges       10/12/17 1501          Time Calculation    Start Time 0824  -RM      PT Received On 10/12/17  -RM      PT Goal Re-Cert Due Date 10/20/17  -RM      Time Calculation- PT    Total Timed Code Minutes- PT 51 minute(s)  -RM        User Key  (r) = Recorded By, (t) = Taken By, (c) = Cosigned By    Initials Name Provider Type    DEONDRE Vargas PTA Physical Therapy Assistant          Therapy Charges for Today     Code Description Service Date Service Provider Modifiers Qty    38374330707 HC GAIT TRAINING EA 15 MIN 10/11/2017 Freddie Vargas, PTA GP 1    28574662340 HC PT THERAPEUTIC ACT EA 15 MIN 10/11/2017 Freddie Vargas, PTA GP 1    37794229715 HC PT THER PROC EA 15 MIN 10/11/2017 Freddie Vargas, JEFE GP 1    42843867841 HC PT THER PROC EA 15 MIN 10/12/2017 Freddie Vargas PTA GP 2    59057901084 HC GAIT TRAINING EA 15 MIN 10/12/2017 Freddie Vargas, JEFE GP 1          PT G-Codes  Outcome Measure Options: AM-PAC 6 Clicks Daily Activity (OT)    Freddie Vargas PTA  10/12/2017          Grossly Intact

## (undated) DEVICE — GOWN,PREVENTION PLUS,XLARGE,STERILE: Brand: MEDLINE

## (undated) DEVICE — 15 DEG. MICROKNIFE - 3MM: Brand: SHARPOINT

## (undated) DEVICE — SUT VIC 2/0 CT1 27IN J259H

## (undated) DEVICE — SOL IRRIG H2O 1000ML STRL

## (undated) DEVICE — SOL IRRIG NACL 9PCT 1000ML BTL

## (undated) DEVICE — SYR LUERLOK 10CC

## (undated) DEVICE — CANN IRR/INJ AIR ANT CHAMBER 6MM BEND 27G

## (undated) DEVICE — SLIT KNIFE FULL HDL-2.85MM ANG: Brand: SHARPOINT

## (undated) DEVICE — WRAP KNEE COLD THERAPY

## (undated) DEVICE — DRSNG WND BORDR/ADHS NONADHR/GZ LF 4X10IN STRL

## (undated) DEVICE — GLV SURG SENSICARE W/ALOE PF LF 8 STRL

## (undated) DEVICE — VIOLET BRAIDED (POLYGLACTIN 910), SYNTHETIC ABSORBABLE SUTURE: Brand: COATED VICRYL

## (undated) DEVICE — DEV TISS CONTRL SUT STRATAFIX SPIRAL PGAPCL 3/0 PSL 60CM

## (undated) DEVICE — PK CATARACT OPTHAMALOGY

## (undated) DEVICE — SHEET,DRAPE,70X100,STERILE: Brand: MEDLINE

## (undated) DEVICE — PLUS HANDPIECE WITH MULTIPLE-ORIFICE TIP WITH SOFT CONE SPLASH SHIELD: Brand: SURGILAV

## (undated) DEVICE — SYS SKIN CLS DERMABOND PRINEO W/22CM MESH TP

## (undated) DEVICE — BOWL AND CEMENT CARTRIDGE WITH BREAKAWAY FEMORAL NOZZLE AND MEDIUM PRESSURIZER: Brand: ACM

## (undated) DEVICE — DISPOSABLE TOURNIQUET CUFF SINGLE BLADDER, SINGLE PORT AND QUICK CONNECT CONNECTOR: Brand: COLOR CUFF

## (undated) DEVICE — PK KN TOTL 20

## (undated) DEVICE — SYR CT MUL PK 200ML HANDIFILL

## (undated) DEVICE — RUBBERBAND LF STRL PK/2

## (undated) DEVICE — SPNG LAP 18X18IN LF STRL PK/5

## (undated) DEVICE — FLEXIBLE YANKAUER,MEDIUM TIP, NO VACUUM CONTROL: Brand: ARGYLE

## (undated) DEVICE — 2108 SERIES SAGITTAL BLADE, NO OFFSET  (24.8 X 1.24 X 80.1MM)

## (undated) DEVICE — CUFF SCD HEMOFORCE SEQ CALF STD MD

## (undated) DEVICE — GLV SURG TRIUMPH ORTHO W/ALOE PF LTX 8 STRL